# Patient Record
Sex: MALE | Race: BLACK OR AFRICAN AMERICAN | NOT HISPANIC OR LATINO | Employment: UNEMPLOYED | URBAN - METROPOLITAN AREA
[De-identification: names, ages, dates, MRNs, and addresses within clinical notes are randomized per-mention and may not be internally consistent; named-entity substitution may affect disease eponyms.]

---

## 2018-05-05 ENCOUNTER — HOSPITAL ENCOUNTER (INPATIENT)
Facility: HOSPITAL | Age: 38
LOS: 3 days | Discharge: HOME | DRG: 292 | End: 2018-05-08
Attending: EMERGENCY MEDICINE | Admitting: HOSPITALIST

## 2018-05-05 ENCOUNTER — APPOINTMENT (EMERGENCY)
Dept: RADIOLOGY | Facility: HOSPITAL | Age: 38
DRG: 292 | End: 2018-05-05

## 2018-05-05 DIAGNOSIS — R06.00 DYSPNEA: ICD-10-CM

## 2018-05-05 DIAGNOSIS — I50.9 CHF (CONGESTIVE HEART FAILURE) (HCC): Primary | ICD-10-CM

## 2018-05-05 DIAGNOSIS — I50.9 ACUTE CONGESTIVE HEART FAILURE, UNSPECIFIED HEART FAILURE TYPE (HCC): ICD-10-CM

## 2018-05-05 DIAGNOSIS — J90 PLEURAL EFFUSION: ICD-10-CM

## 2018-05-05 PROBLEM — N17.9 AKI (ACUTE KIDNEY INJURY) (HCC): Status: ACTIVE | Noted: 2018-05-05

## 2018-05-05 PROBLEM — Z89.512 HX OF BKA, LEFT (HCC): Status: ACTIVE | Noted: 2018-05-05

## 2018-05-05 PROBLEM — E87.6 HYPOKALEMIA: Status: ACTIVE | Noted: 2018-05-05

## 2018-05-05 PROBLEM — J45.909 ASTHMA: Status: ACTIVE | Noted: 2018-05-05

## 2018-05-05 LAB
ALBUMIN SERPL BCP-MCNC: 1.5 G/DL (ref 3.5–5)
ALP SERPL-CCNC: 304 U/L (ref 46–116)
ALT SERPL W P-5'-P-CCNC: 43 U/L (ref 12–78)
ANION GAP SERPL CALCULATED.3IONS-SCNC: 2 MMOL/L (ref 4–13)
ANION GAP SERPL CALCULATED.3IONS-SCNC: 8 MMOL/L (ref 4–13)
AST SERPL W P-5'-P-CCNC: 98 U/L (ref 5–45)
BASOPHILS # BLD AUTO: 0.05 THOUSANDS/ΜL (ref 0–0.1)
BASOPHILS NFR BLD AUTO: 0 % (ref 0–1)
BILIRUB SERPL-MCNC: 0.61 MG/DL (ref 0.2–1)
BUN SERPL-MCNC: 18 MG/DL (ref 5–25)
BUN SERPL-MCNC: 19 MG/DL (ref 5–25)
CALCIUM SERPL-MCNC: 8.4 MG/DL (ref 8.3–10.1)
CALCIUM SERPL-MCNC: 8.8 MG/DL (ref 8.3–10.1)
CHLORIDE SERPL-SCNC: 102 MMOL/L (ref 100–108)
CHLORIDE SERPL-SCNC: 102 MMOL/L (ref 100–108)
CO2 SERPL-SCNC: 28 MMOL/L (ref 21–32)
CO2 SERPL-SCNC: 31 MMOL/L (ref 21–32)
CREAT SERPL-MCNC: 1.18 MG/DL (ref 0.6–1.3)
CREAT SERPL-MCNC: 1.36 MG/DL (ref 0.6–1.3)
EOSINOPHIL # BLD AUTO: 0.27 THOUSAND/ΜL (ref 0–0.61)
EOSINOPHIL NFR BLD AUTO: 2 % (ref 0–6)
ERYTHROCYTE [DISTWIDTH] IN BLOOD BY AUTOMATED COUNT: 14.5 % (ref 11.6–15.1)
GFR SERPL CREATININE-BSD FRML MDRD: 75 ML/MIN/1.73SQ M
GFR SERPL CREATININE-BSD FRML MDRD: 89 ML/MIN/1.73SQ M
GLUCOSE SERPL-MCNC: 207 MG/DL (ref 65–140)
GLUCOSE SERPL-MCNC: 227 MG/DL (ref 65–140)
GLUCOSE SERPL-MCNC: 344 MG/DL (ref 65–140)
GLUCOSE SERPL-MCNC: 350 MG/DL (ref 65–140)
HCT VFR BLD AUTO: 29.8 % (ref 36.5–49.3)
HGB BLD-MCNC: 9.6 G/DL (ref 12–17)
LYMPHOCYTES # BLD AUTO: 1.84 THOUSANDS/ΜL (ref 0.6–4.47)
LYMPHOCYTES NFR BLD AUTO: 16 % (ref 14–44)
MCH RBC QN AUTO: 28.7 PG (ref 26.8–34.3)
MCHC RBC AUTO-ENTMCNC: 32.2 G/DL (ref 31.4–37.4)
MCV RBC AUTO: 89 FL (ref 82–98)
MONOCYTES # BLD AUTO: 0.7 THOUSAND/ΜL (ref 0.17–1.22)
MONOCYTES NFR BLD AUTO: 6 % (ref 4–12)
NEUTROPHILS # BLD AUTO: 8.59 THOUSANDS/ΜL (ref 1.85–7.62)
NEUTS SEG NFR BLD AUTO: 76 % (ref 43–75)
NRBC BLD AUTO-RTO: 0 /100 WBCS
NT-PROBNP SERPL-MCNC: 2340 PG/ML
PLATELET # BLD AUTO: 429 THOUSANDS/UL (ref 149–390)
PMV BLD AUTO: 10.1 FL (ref 8.9–12.7)
POTASSIUM SERPL-SCNC: 2.8 MMOL/L (ref 3.5–5.3)
POTASSIUM SERPL-SCNC: 6.1 MMOL/L (ref 3.5–5.3)
PROT SERPL-MCNC: 8 G/DL (ref 6.4–8.2)
RBC # BLD AUTO: 3.34 MILLION/UL (ref 3.88–5.62)
SODIUM SERPL-SCNC: 135 MMOL/L (ref 136–145)
SODIUM SERPL-SCNC: 138 MMOL/L (ref 136–145)
TROPONIN I SERPL-MCNC: <0.02 NG/ML
WBC # BLD AUTO: 11.45 THOUSAND/UL (ref 4.31–10.16)

## 2018-05-05 PROCEDURE — 99285 EMERGENCY DEPT VISIT HI MDM: CPT

## 2018-05-05 PROCEDURE — 83880 ASSAY OF NATRIURETIC PEPTIDE: CPT | Performed by: EMERGENCY MEDICINE

## 2018-05-05 PROCEDURE — 84484 ASSAY OF TROPONIN QUANT: CPT | Performed by: EMERGENCY MEDICINE

## 2018-05-05 PROCEDURE — 71046 X-RAY EXAM CHEST 2 VIEWS: CPT

## 2018-05-05 PROCEDURE — 80048 BASIC METABOLIC PNL TOTAL CA: CPT | Performed by: EMERGENCY MEDICINE

## 2018-05-05 PROCEDURE — 82948 REAGENT STRIP/BLOOD GLUCOSE: CPT

## 2018-05-05 PROCEDURE — 85025 COMPLETE CBC W/AUTO DIFF WBC: CPT | Performed by: EMERGENCY MEDICINE

## 2018-05-05 PROCEDURE — 80053 COMPREHEN METABOLIC PANEL: CPT | Performed by: EMERGENCY MEDICINE

## 2018-05-05 PROCEDURE — 36415 COLL VENOUS BLD VENIPUNCTURE: CPT

## 2018-05-05 PROCEDURE — 96374 THER/PROPH/DIAG INJ IV PUSH: CPT

## 2018-05-05 PROCEDURE — 93005 ELECTROCARDIOGRAM TRACING: CPT | Performed by: EMERGENCY MEDICINE

## 2018-05-05 PROCEDURE — 99223 1ST HOSP IP/OBS HIGH 75: CPT | Performed by: PHYSICIAN ASSISTANT

## 2018-05-05 PROCEDURE — 94640 AIRWAY INHALATION TREATMENT: CPT

## 2018-05-05 RX ORDER — ACETAMINOPHEN 325 MG/1
650 TABLET ORAL ONCE
Status: COMPLETED | OUTPATIENT
Start: 2018-05-05 | End: 2018-05-05

## 2018-05-05 RX ORDER — ONDANSETRON 2 MG/ML
4 INJECTION INTRAMUSCULAR; INTRAVENOUS EVERY 6 HOURS PRN
Status: DISCONTINUED | OUTPATIENT
Start: 2018-05-05 | End: 2018-05-08 | Stop reason: HOSPADM

## 2018-05-05 RX ORDER — HYDRALAZINE HYDROCHLORIDE 25 MG/1
25 TABLET, FILM COATED ORAL 2 TIMES DAILY
Status: DISCONTINUED | OUTPATIENT
Start: 2018-05-05 | End: 2018-05-08 | Stop reason: HOSPADM

## 2018-05-05 RX ORDER — INSULIN GLARGINE 100 [IU]/ML
75 INJECTION, SOLUTION SUBCUTANEOUS
Status: DISCONTINUED | OUTPATIENT
Start: 2018-05-05 | End: 2018-05-06

## 2018-05-05 RX ORDER — LEVOTHYROXINE SODIUM 0.03 MG/1
25 TABLET ORAL DAILY
COMMUNITY

## 2018-05-05 RX ORDER — LEVOTHYROXINE SODIUM 0.03 MG/1
25 TABLET ORAL
Status: DISCONTINUED | OUTPATIENT
Start: 2018-05-06 | End: 2018-05-08 | Stop reason: HOSPADM

## 2018-05-05 RX ORDER — LABETALOL HYDROCHLORIDE 5 MG/ML
10 INJECTION, SOLUTION INTRAVENOUS ONCE
Status: COMPLETED | OUTPATIENT
Start: 2018-05-05 | End: 2018-05-05

## 2018-05-05 RX ORDER — ACETAMINOPHEN 325 MG/1
650 TABLET ORAL EVERY 4 HOURS PRN
Status: DISCONTINUED | OUTPATIENT
Start: 2018-05-05 | End: 2018-05-08 | Stop reason: HOSPADM

## 2018-05-05 RX ORDER — HEPARIN SODIUM 5000 [USP'U]/ML
5000 INJECTION, SOLUTION INTRAVENOUS; SUBCUTANEOUS EVERY 8 HOURS SCHEDULED
Status: DISCONTINUED | OUTPATIENT
Start: 2018-05-05 | End: 2018-05-08 | Stop reason: HOSPADM

## 2018-05-05 RX ORDER — LEVALBUTEROL 1.25 MG/.5ML
1.25 SOLUTION, CONCENTRATE RESPIRATORY (INHALATION)
Status: DISCONTINUED | OUTPATIENT
Start: 2018-05-05 | End: 2018-05-05

## 2018-05-05 RX ORDER — ALBUTEROL SULFATE 90 UG/1
2 AEROSOL, METERED RESPIRATORY (INHALATION) EVERY 4 HOURS PRN
Status: DISCONTINUED | OUTPATIENT
Start: 2018-05-05 | End: 2018-05-08 | Stop reason: HOSPADM

## 2018-05-05 RX ORDER — FUROSEMIDE 10 MG/ML
20 INJECTION INTRAMUSCULAR; INTRAVENOUS ONCE
Status: COMPLETED | OUTPATIENT
Start: 2018-05-05 | End: 2018-05-05

## 2018-05-05 RX ORDER — LEVALBUTEROL 1.25 MG/.5ML
1.25 SOLUTION, CONCENTRATE RESPIRATORY (INHALATION) EVERY 6 HOURS PRN
Status: DISCONTINUED | OUTPATIENT
Start: 2018-05-05 | End: 2018-05-07

## 2018-05-05 RX ORDER — LISINOPRIL 20 MG/1
1 TABLET ORAL DAILY
Status: ON HOLD | COMMUNITY
Start: 2018-01-29 | End: 2018-05-08

## 2018-05-05 RX ORDER — ALBUTEROL SULFATE 2.5 MG/3ML
5 SOLUTION RESPIRATORY (INHALATION) ONCE
Status: COMPLETED | OUTPATIENT
Start: 2018-05-05 | End: 2018-05-05

## 2018-05-05 RX ORDER — ACETAMINOPHEN 325 MG/1
650 TABLET ORAL ONCE
Status: DISCONTINUED | OUTPATIENT
Start: 2018-05-05 | End: 2018-05-05

## 2018-05-05 RX ORDER — FUROSEMIDE 10 MG/ML
20 INJECTION INTRAMUSCULAR; INTRAVENOUS
Status: DISCONTINUED | OUTPATIENT
Start: 2018-05-05 | End: 2018-05-07

## 2018-05-05 RX ORDER — POTASSIUM CHLORIDE 20 MEQ/1
40 TABLET, EXTENDED RELEASE ORAL ONCE
Status: COMPLETED | OUTPATIENT
Start: 2018-05-05 | End: 2018-05-05

## 2018-05-05 RX ORDER — INSULIN GLARGINE 100 [IU]/ML
75 INJECTION, SOLUTION SUBCUTANEOUS
Status: ON HOLD | COMMUNITY
End: 2018-05-16

## 2018-05-05 RX ADMIN — LABETALOL 20 MG/4 ML (5 MG/ML) INTRAVENOUS SYRINGE 10 MG: at 13:25

## 2018-05-05 RX ADMIN — INSULIN LISPRO 5 UNITS: 100 INJECTION, SOLUTION INTRAVENOUS; SUBCUTANEOUS at 21:17

## 2018-05-05 RX ADMIN — FUROSEMIDE 20 MG: 10 INJECTION, SOLUTION INTRAMUSCULAR; INTRAVENOUS at 18:08

## 2018-05-05 RX ADMIN — HEPARIN SODIUM 5000 UNITS: 5000 INJECTION, SOLUTION INTRAVENOUS; SUBCUTANEOUS at 21:08

## 2018-05-05 RX ADMIN — POTASSIUM CHLORIDE 40 MEQ: 1500 TABLET, EXTENDED RELEASE ORAL at 18:08

## 2018-05-05 RX ADMIN — ALBUTEROL SULFATE 5 MG: 2.5 SOLUTION RESPIRATORY (INHALATION) at 12:08

## 2018-05-05 RX ADMIN — HYDRALAZINE HYDROCHLORIDE 25 MG: 25 TABLET, FILM COATED ORAL at 18:09

## 2018-05-05 RX ADMIN — INSULIN LISPRO 5 UNITS: 100 INJECTION, SOLUTION INTRAVENOUS; SUBCUTANEOUS at 18:59

## 2018-05-05 RX ADMIN — FUROSEMIDE 20 MG: 10 INJECTION, SOLUTION INTRAMUSCULAR; INTRAVENOUS at 12:08

## 2018-05-05 RX ADMIN — IPRATROPIUM BROMIDE 0.5 MG: 0.5 SOLUTION RESPIRATORY (INHALATION) at 12:08

## 2018-05-05 RX ADMIN — ACETAMINOPHEN 650 MG: 325 TABLET, FILM COATED ORAL at 13:25

## 2018-05-05 RX ADMIN — INSULIN GLARGINE 75 UNITS: 100 INJECTION, SOLUTION SUBCUTANEOUS at 21:09

## 2018-05-05 RX ADMIN — ACETAMINOPHEN 650 MG: 325 TABLET, FILM COATED ORAL at 21:13

## 2018-05-05 RX ADMIN — NITROGLYCERIN 1 INCH: 20 OINTMENT TOPICAL at 18:09

## 2018-05-05 NOTE — ED NOTES
Patient transported to [facility] [place], INGE  05/05/18 5622

## 2018-05-05 NOTE — ED PROVIDER NOTES
History  Chief Complaint   Patient presents with    Shortness of Breath     Patient reports SOB X2 days  Patient denies CP  History provided by:  Patient   used: No    Shortness of Breath   Severity:  Moderate  Onset quality:  Gradual  Duration:  2 days  Timing:  Intermittent  Progression:  Worsening  Chronicity:  New  Context: activity    Relieved by:  Nothing  Worsened by:  Nothing  Ineffective treatments:  None tried  Associated symptoms: chest pain    Associated symptoms: no abdominal pain, no cough, no fever, no headaches, no neck pain, no rash, no sore throat, no sputum production and no vomiting    Chest pain:     Quality: tightness      Severity:  Mild    Onset quality:  Gradual    Duration:  2 days    Timing:  Intermittent    Progression:  Waxing and waning    Chronicity:  New  Risk factors comment:  HTN, DM      Prior to Admission Medications   Prescriptions Last Dose Informant Patient Reported? Taking? Cholecalciferol (VITAMIN D PO)   Yes Yes   Sig: Take by mouth daily   Levothyroxine Sodium (SYNTHROID PO)   Yes Yes   Sig: Take 20 mcg by mouth daily   insulin glargine (LANTUS) 100 units/mL subcutaneous injection   Yes Yes   Sig: Inject 75 Units under the skin daily at bedtime   insulin lispro (HumaLOG) 100 units/mL injection   Yes Yes   Sig: Inject 45 Units under the skin daily   insulin lispro (HumaLOG) 100 units/mL injection   Yes Yes   Sig: Inject under the skin daily at bedtime as needed for high blood sugar   lisinopril (ZESTRIL) 20 mg tablet   Yes Yes   Sig: Take 1 tablet by mouth Daily      Facility-Administered Medications: None       Past Medical History:   Diagnosis Date    Diabetes mellitus ([facility] [address] )     Disease of thyroid gland     Hypertension     Neuropathy        Past Surgical History:   Procedure Laterality Date    LEG AMPUTATION      LEG SURGERY         History reviewed  No pertinent family history    I have reviewed and agree with the history as documented  Social History   Substance Use Topics    Smoking status: Never Smoker    Smokeless tobacco: Never Used    Alcohol use No        Review of Systems   Constitutional: Negative for activity change, chills and fever  HENT: Negative for facial swelling, sore throat and trouble swallowing  Eyes: Negative for pain and visual disturbance  Respiratory: Positive for shortness of breath  Negative for cough, sputum production and chest tightness  Cardiovascular: Positive for chest pain  Negative for leg swelling  Gastrointestinal: Negative for abdominal pain, blood in stool, diarrhea, nausea and vomiting  Genitourinary: Negative for dysuria and flank pain  Musculoskeletal: Negative for back pain, neck pain and neck stiffness  Skin: Negative for pallor and rash  Allergic/Immunologic: Negative for environmental allergies and immunocompromised state  Neurological: Negative for dizziness and headaches  Hematological: Negative for adenopathy  Does not bruise/bleed easily  Psychiatric/Behavioral: Negative for agitation and behavioral problems  All other systems reviewed and are negative  Physical Exam  ED Triage Vitals   Temperature Pulse Respirations Blood Pressure SpO2   05/05/18 1045 05/05/18 1044 05/05/18 1044 05/05/18 1044 05/05/18 1044   98 5 °F (36 9 °C) 93 22 (!) 184/99 99 %      Temp Source Heart Rate Source Patient Position - Orthostatic VS BP Location FiO2 (%)   05/05/18 1045 05/05/18 1044 05/05/18 1044 05/05/18 1212 --   Oral Monitor Lying Left arm       Pain Score       05/05/18 1212       8           Orthostatic Vital Signs  Vitals:    05/05/18 1044 05/05/18 1212 05/05/18 1336 05/05/18 1401   BP: (!) 184/99 (!) 184/103 (!) 176/80 (!) 186/100   Pulse: 93 92 95 93   Patient Position - Orthostatic VS: Lying Sitting Sitting Sitting       Physical Exam   Constitutional: He is oriented to person, place, and time  He appears well-developed and well-nourished  No distress  HENT:   Head: Normocephalic and atraumatic  Eyes: EOM are normal    Neck: Normal range of motion  Neck supple  Cardiovascular: Normal rate, regular rhythm, normal heart sounds and intact distal pulses  Pulmonary/Chest: Effort normal    Diminished bibasal   Abdominal: Soft  Bowel sounds are normal  There is no tenderness  There is no rebound and no guarding  Musculoskeletal: Normal range of motion  Neurological: He is alert and oriented to person, place, and time  Skin: Skin is warm and dry  Psychiatric: He has a normal mood and affect  Nursing note and vitals reviewed        ED Medications  Medications   furosemide (LASIX) injection 20 mg (20 mg Intravenous Given 5/5/18 1808)   insulin glargine (LANTUS) subcutaneous injection 75 Units 0 75 mL (75 Units Subcutaneous Given 5/5/18 2109)   insulin lispro (HumaLOG) 100 units/mL subcutaneous injection 45 Units (not administered)   levothyroxine tablet 25 mcg (not administered)   ondansetron (ZOFRAN) injection 4 mg (not administered)   heparin (porcine) subcutaneous injection 5,000 Units (5,000 Units Subcutaneous Given 5/5/18 2108)   acetaminophen (TYLENOL) tablet 650 mg (650 mg Oral Given 5/5/18 2113)   hydrALAZINE (APRESOLINE) tablet 25 mg (25 mg Oral Given 5/5/18 1809)   nitroglycerin (NITRO-BID) 2 % TD ointment 1 inch (1 inch Topical Given 5/5/18 1809)   insulin lispro (HumaLOG) 100 units/mL subcutaneous injection 1-6 Units (5 Units Subcutaneous Given 5/5/18 1859)   insulin lispro (HumaLOG) 100 units/mL subcutaneous injection 1-6 Units (5 Units Subcutaneous Given 5/5/18 2117)   albuterol (PROVENTIL HFA,VENTOLIN HFA) inhaler 2 puff (not administered)   levalbuterol (XOPENEX) inhalation solution 1 25 mg (not administered)   albuterol inhalation solution 5 mg (5 mg Nebulization Given 5/5/18 1208)   ipratropium (ATROVENT) 0 02 % inhalation solution 0 5 mg (0 5 mg Nebulization Given 5/5/18 1208)   furosemide (LASIX) injection 20 mg (20 mg Intravenous Given 5/5/18 1208)   labetalol (NORMODYNE) injection 10 mg (10 mg Intravenous Given 5/5/18 1325)   acetaminophen (TYLENOL) tablet 650 mg (650 mg Oral Given 5/5/18 1325)   potassium chloride (K-DUR,KLOR-CON) CR tablet 40 mEq (40 mEq Oral Given 5/5/18 1808)       Diagnostic Studies  Results Reviewed     Procedure Component Value Units Date/Time    Basic metabolic panel [[id number]]  (Abnormal) Collected:  05/05/18 1323    Lab Status:  Final result Specimen:  Blood from Arm, Left Updated:  05/05/18 1345     Sodium 138 mmol/L      Potassium 2 8 (L) mmol/L      Chloride 102 mmol/L      CO2 28 mmol/L      Anion Gap 8 mmol/L      BUN 18 mg/dL      Creatinine 1 36 (H) mg/dL      Glucose 227 (H) mg/dL      Calcium 8 8 mg/dL      eGFR 75 ml/min/1 73sq m     Narrative:         National Kidney Disease Education Program recommendations are as follows:  GFR calculation is accurate only with a steady state creatinine  Chronic Kidney disease less than 60 ml/min/1 73 sq  meters  Kidney failure less than 15 ml/min/1 73 sq  meters  Comprehensive metabolic panel [[id number]]  (Abnormal) Collected:  05/05/18 1054    Lab Status:  Final result Specimen:  Blood from Arm, Right Updated:  05/05/18 1138     Sodium 135 (L) mmol/L      Potassium 6 1 (H) mmol/L      Chloride 102 mmol/L      CO2 31 mmol/L      Anion Gap 2 (L) mmol/L      BUN 19 mg/dL      Creatinine 1 18 mg/dL      Glucose 207 (H) mg/dL      Calcium 8 4 mg/dL      AST 98 (H) U/L      ALT 43 U/L      Alkaline Phosphatase 304 (H) U/L      Total Protein 8 0 g/dL      Albumin 1 5 (L) g/dL      Total Bilirubin 0 61 mg/dL      eGFR 89 ml/min/1 73sq m     Narrative:         National Kidney Disease Education Program recommendations are as follows:  GFR calculation is accurate only with a steady state creatinine  Chronic Kidney disease less than 60 ml/min/1 73 sq  meters  Kidney failure less than 15 ml/min/1 73 sq  meters      BNP [[id number]]  (Abnormal) Collected:  05/05/18 1054    Lab Status: Final result Specimen:  Blood from Arm, Right Updated:  05/05/18 1138     NT-proBNP 2,340 (H) pg/mL     Troponin I [[id number]]  (Normal) Collected:  05/05/18 1054    Lab Status:  Final result Specimen:  Blood from Arm, Right Updated:  05/05/18 1120     Troponin I <0 02 ng/mL     Narrative:         [employer] Chemistry analyzer 99% cutoff is > 0 04 ng/mL in network labs    o cTnI 99% cutoff is useful only when applied to patients in the clinical setting of myocardial ischemia  o cTnI 99% cutoff should be interpreted in the context of clinical history, ECG findings and possibly cardiac imaging to establish correct diagnosis  o cTnI 99% cutoff may be suggestive but clearly not indicative of a coronary event without the clinical setting of myocardial ischemia      CBC and differential [[id number]]  (Abnormal) Collected:  05/05/18 1054    Lab Status:  Final result Specimen:  Blood from Arm, Right Updated:  05/05/18 1103     WBC 11 45 (H) Thousand/uL      RBC 3 34 (L) Million/uL      Hemoglobin 9 6 (L) g/dL      Hematocrit 29 8 (L) %      MCV 89 fL      MCH 28 7 pg      MCHC 32 2 g/dL      RDW 14 5 %      MPV 10 1 fL      Platelets 880 (H) Thousands/uL      nRBC 0 /100 WBCs      Neutrophils Relative 76 (H) %      Lymphocytes Relative 16 %      Monocytes Relative 6 %      Eosinophils Relative 2 %      Basophils Relative 0 %      Neutrophils Absolute 8 59 (H) Thousands/µL      Lymphocytes Absolute 1 84 Thousands/µL      Monocytes Absolute 0 70 Thousand/µL      Eosinophils Absolute 0 27 Thousand/µL      Basophils Absolute 0 05 Thousands/µL                  X-ray chest 2 views   Final Result by Vicente Hernandez MD (05/05 1116)   Suspected mild vascular congestion and trace pleural effusions         Workstation performed: [id number]                    Procedures  ECG 12 Lead Documentation  Date/Time: 5/5/2018 12:19 PM  Performed by: Cirilo Leos  Authorized by: Cirilo Leos     Indications / Diagnosis:  Dyspnea  ECG reviewed by me, the ED Provider: yes    Patient location:  ED  Interpretation:     Interpretation: normal    Rate:     ECG rate assessment: normal    Rhythm:     Rhythm: sinus rhythm    Ectopy:     Ectopy: none    QRS:     QRS axis:  Normal  Conduction:     Conduction: normal    ST segments:     ST segments:  Non-specific    Depression:  I, V5 and V6  T waves:     T waves: normal             Phone Contacts  ED Phone Contact    ED Course  ED Course as of May 05 2213   Sat May 05, 2018   [facility] [place], CXR reviewed, pleural effusions, elevated BNP, we will give Lasix  Elevated potassium noted, we will repeat BMP after albuterol/Atrovent neb  1216 Lasix ordered  NT-proBNP: (!) 2,340                               MDM  Number of Diagnoses or Management Options  CHF (congestive heart failure) ([facility] [address] ): new and requires workup  Dyspnea: new and requires workup  Pleural effusion: new and requires workup  Diagnosis management comments: Patient is a 40-year-old male, history of hypertension, diabetes, asthma; comes in with complaints of dyspnea the past 2 days, patient is the [place]; also associated with chest tightness, intermittent, denies diaphoresis, fever, cough or phlegm  On exam hypertension noted, lung exam shows diminished air entry at bilateral bases, cardiovascular heart sounds normal, mild peripheral edema noted  Impression:  Congestive heart failure, asthma, acute coronary syndrome  Will check labs including CBC, CMP, BNP, EKG, chest x-ray, troponin         Amount and/or Complexity of Data Reviewed  Clinical lab tests: ordered and reviewed  Tests in the radiology section of CPT®: ordered and reviewed  Tests in the medicine section of CPT®: ordered and reviewed  Discuss the patient with other providers: yes  Independent visualization of images, tracings, or specimens: yes      CritCare Time    Disposition  Final diagnoses:   CHF (congestive heart failure) (HCC)   Dyspnea   Pleural effusion     Time reflects when diagnosis was documented in both MDM as applicable and the Disposition within this note     Time User Action Codes Description Comment    5/5/2018 12:21 PM Ever Diaz Add [I50 9] CHF (congestive heart failure) ([address] )     5/5/2018 12:21 PM Ever Diaz Add [R06 00] Dyspnea     5/5/2018 12:22 PM Ever Diaz Add [J90] Pleural effusion     5/5/2018  5:33 PM CODY Dai [facility] Add [I50 9] Acute congestive heart failure, unspecified heart failure type [facility])       ED Disposition     ED Disposition Condition Comment    Admit  Case was discussed with SHELLY (Dr Carmelo Shea) and the patient's admission status was agreed to be Admission Status: inpatient status to the service of Dr Carmelo Shea  Follow-up Information    None       Current Discharge Medication List      CONTINUE these medications which have NOT CHANGED    Details   Cholecalciferol (VITAMIN D PO) Take by mouth daily      insulin glargine (LANTUS) 100 units/mL subcutaneous injection Inject 75 Units under the skin daily at bedtime      !! insulin lispro (HumaLOG) 100 units/mL injection Inject 45 Units under the skin daily      !! insulin lispro (HumaLOG) 100 units/mL injection Inject under the skin daily at bedtime as needed for high blood sugar      Levothyroxine Sodium (SYNTHROID PO) Take 20 mcg by mouth daily      lisinopril (ZESTRIL) 20 mg tablet Take 1 tablet by mouth Daily       ! ! - Potential duplicate medications found  Please discuss with provider  No discharge procedures on file      ED Provider  Electronically Signed by           Mariana Escalante MD  05/05/18 9917

## 2018-05-05 NOTE — H&P
History and Physical - [facility] Internal Medicine    Patient Information: Chrissie Hunt 34 y o  male MRN: [id number]  Unit/Bed#: E4 -01 Encounter: [id number]  Admitting Physician: Lizzie Lloyd PA-C  PCP: No primary care provider on file  Date of Admission:  05/05/18    Assessment/Plan:    Hospital Problem List:     Principal Problem:    Acute congestive heart failure (HCC)  Active Problems:    Hypertension    Disease of thyroid gland    Diabetes mellitus (HCC)    Hypokalemia    GARO (acute kidney injury) ([facility] [address] )    Asthma    Hx of BKA, left ([facility] [address] )      Primary Problem(s):  · Acute CHF- new diagnosis  · With mild overload- mild crackles in bases and mild THAI   · Chest x-ray with suspected mild vascular congestion and small pleural effusions  · Elevated BNP of 2340 with no prior  · Obtain echocardiogram   · Started on Lasix IV 20 mg in ED  · Will continue 20 mg IV Lasix twice daily given patient is Lasix naive  · Monitor I&Os, daily weights, place on telemetry  · Admission weight 238 lbs  · Consult Cardiology  Additional Problems:   · Suspected GARO:  Creatinine 1 36  Unable to baseline previous creatinine  Continue monitor in the setting of IV diuretics  · Type 2 diabetes with hyperglycemia:  Presenting with glucose of 227  Home regimen consists of Lantus 75 units before bed and Humalog 45 units  Placed on insulin sliding scale    · Essential hypertension:  Presenting with elevated blood pressure is 176/80, 184/103  Home regimen of lisinopril 20 mg daily  Will hold here in the setting of GARO  Received one dose of IV labetalol 10 mg in ED  Will provide hydralazine 25 mg twice daily and nitropaste for better blood pressure control  Appreciate cardiology recommendations  · Hypokalemia:  Potassium initially noted to be 6 1 however after neb treatment and repeat BMP K was 2 8  Will replete and continue to monitor      · Hypothyroidism:  Continue Synthroid    · Status post left BKA    · Asthma: Patient usually uses albuterol inhaler for his acute symptoms  He left his inhaler in [place] and has not been able to use it for the past 4 to 5 months  He is not on any maintenance inhalers  Provide albuterol nebs here and inhaler  Will need one upon dc  No active wheezing or obvious exacerbation  VTE Prophylaxis: Heparin  / sequential compression device   Code Status: full code  Anticipated Length of Stay:  Patient will be admitted on an Inpatient basis with an anticipated length of stay of  Greater than 2 midnights  Justification for Hospital Stay: acute chf with need for IV diuresis    Chief Complaint:   Shortness of breath    History of Present Illness:    Claudell Crape is a 34 y o  male with past medical history of type 2 diabetes, essential hypertension, hypothyroidism, asthma, status post left BKA  Patient normally resides in [facility] however has been here visiting his daughter for the past 4-5 months  Patient leaves for [place] again on May 21st     He presents with shortness of breath ongoing for the past 2 days  Patient notes he initially began with a tightness in his chest and felt it was difficult to catch his breath  Admits to gasping for air and being unable to lie flat in bed  Patient describes the experience as if he was drowning  He noticed his symptoms slightly improved and then worsened again today prompting him to seek further evaluation  Associated dry cough, increased swelling in his lower legs  He also notes approximately 20 lbs weight gain over the past 4-5 months  Pt uses the salt shaker daily- mostly for his dinner  Admits to eating canned foods at times  Denies history of CHF  Has never been on diuretics  Does not smoke, drink alcohol, or use drugs  Denies any palpitations, pain with inspiration, chest pain  No nausea vomiting, diarrhea or dysuria  Review of Systems:    General:   No Fever or chills; + weight gain   EENT:   No ear pain, facial swelling;  No sneezing, sore throat  Skin:   No rashes, color changes  Respiratory:     + shortness of breath, cough, wheezing,    Cardiovascular:     No chest pain, palpitations  Gastrointestinal:    No nausea, vomiting, diarrhea; No abdominal pain  Musculoskeletal:     No myalgias, swelling  Neurologic:   No dizziness, numbness, weakness  No speech difficulties  Psych:   No agitation,     Otherwise, All other twelve-point review of systems normal      Past Medical and Surgical History:     Past Medical History:   Diagnosis Date    Diabetes mellitus ([address] )     Disease of thyroid gland     Hypertension     Neuropathy        Past Surgical History:   Procedure Laterality Date    LEG AMPUTATION      LEG SURGERY         Meds/Allergies:    No current facility-administered medications for this encounter  Allergies   Allergen Reactions    Shellfish-Derived Products     Tramadol        Allergies: Allergies   Allergen Reactions    Shellfish-Derived Products     Tramadol        Social History:     Marital Status: Single     Substance Use History:   History   Alcohol Use No     History   Smoking Status    Never Smoker   Smokeless Tobacco    Never Used     History   Drug Use No       Family History:    non-contributory    Physical Exam:     Vitals:   Blood Pressure: (!) 186/100 (05/05/18 1401)  Pulse: 93 (05/05/18 1401)  Temperature: 98 °F (36 7 °C) (05/05/18 1401)  Temp Source: Tympanic (05/05/18 1401)  Respirations: 19 (05/05/18 1401)  Height: 5' 2" (157 5 cm) (05/05/18 1401)  Weight - Scale: 108 kg (238 lb 14 4 oz) (05/05/18 1045)  SpO2: 92 % (05/05/18 1401)    Physical Exam   Constitutional: He appears well-developed and well-nourished  No distress  HENT:   Head: Normocephalic and atraumatic  Eyes: Conjunctivae are normal    Cardiovascular: Normal rate, regular rhythm and normal heart sounds  Pulmonary/Chest: Effort normal  No respiratory distress  He has no wheezes  He has rales   He exhibits no tenderness  Mild crackles at bases of lungs bilaterally   Abdominal: Soft  Bowel sounds are normal  He exhibits no distension and no mass  There is no tenderness  There is no rebound and no guarding  Musculoskeletal:   Mild lower extremity edema  Skin: He is not diaphoretic  S/p left BKA  With wounds to both knees and right elbow  Nursing note and vitals reviewed  Additional Data:     Lab Results: I have personally reviewed pertinent reports  Results from last 7 days  Lab Units 05/05/18  1054   WBC Thousand/uL 11 45*   HEMOGLOBIN g/dL 9 6*   HEMATOCRIT % 29 8*   PLATELETS Thousands/uL 429*   NEUTROS PCT % 76*   LYMPHS PCT % 16   MONOS PCT % 6   EOS PCT % 2       Results from last 7 days  Lab Units 05/05/18  1323 05/05/18  1054   SODIUM mmol/L 138 135*   POTASSIUM mmol/L 2 8* 6 1*   CHLORIDE mmol/L 102 102   CO2 mmol/L 28 31   BUN mg/dL 18 19   CREATININE mg/dL 1 36* 1 18   CALCIUM mg/dL 8 8 8 4   TOTAL PROTEIN g/dL  --  8 0   BILIRUBIN TOTAL mg/dL  --  0 61   ALK PHOS U/L  --  304*   ALT U/L  --  43   AST U/L  --  98*   GLUCOSE RANDOM mg/dL 227* 207*           Imaging: I have personally reviewed pertinent reports  X-ray Chest 2 Views    Result Date: 5/5/2018  Narrative: CHEST INDICATION: 42-year-old male, chest pain, shortness of breath COMPARISON:  None EXAM PERFORMED/VIEWS:  XR CHEST PA & LATERAL FINDINGS: Mild bilateral pulmonary vascular congestion Cardiomediastinal silhouette appears unremarkable  Probable trace pleural effusions  No pneumothorax Osseous structures appear within normal limits for patient age  Impression: Suspected mild vascular congestion and trace pleural effusions Workstation performed: [id number]       EKG, Pathology, and Other Studies Reviewed on Admission:   · EKG: please refer to records     Allscripts Records Reviewed: Yes     Total Time for Visit, including Counseling / Coordination of Care: 45 minutes    Greater than 50% of this total time spent on direct patient counseling and coordination of care  ** Please Note: This note has been constructed using a voice recognition system   **

## 2018-05-06 LAB
ALBUMIN SERPL BCP-MCNC: 1.6 G/DL (ref 3.5–5)
ALP SERPL-CCNC: 275 U/L (ref 46–116)
ALT SERPL W P-5'-P-CCNC: 33 U/L (ref 12–78)
ANION GAP SERPL CALCULATED.3IONS-SCNC: 4 MMOL/L (ref 4–13)
AST SERPL W P-5'-P-CCNC: 34 U/L (ref 5–45)
ATRIAL RATE: 91 BPM
BILIRUB SERPL-MCNC: 0.3 MG/DL (ref 0.2–1)
BUN SERPL-MCNC: 26 MG/DL (ref 5–25)
CALCIUM SERPL-MCNC: 8.6 MG/DL (ref 8.3–10.1)
CHLORIDE SERPL-SCNC: 100 MMOL/L (ref 100–108)
CO2 SERPL-SCNC: 31 MMOL/L (ref 21–32)
CREAT SERPL-MCNC: 1.29 MG/DL (ref 0.6–1.3)
ERYTHROCYTE [DISTWIDTH] IN BLOOD BY AUTOMATED COUNT: 14.6 % (ref 11.6–15.1)
GFR SERPL CREATININE-BSD FRML MDRD: 80 ML/MIN/1.73SQ M
GLUCOSE SERPL-MCNC: 123 MG/DL (ref 65–140)
GLUCOSE SERPL-MCNC: 131 MG/DL (ref 65–140)
GLUCOSE SERPL-MCNC: 209 MG/DL (ref 65–140)
GLUCOSE SERPL-MCNC: 222 MG/DL (ref 65–140)
GLUCOSE SERPL-MCNC: 226 MG/DL (ref 65–140)
HCT VFR BLD AUTO: 30.3 % (ref 36.5–49.3)
HGB BLD-MCNC: 9.6 G/DL (ref 12–17)
MAGNESIUM SERPL-MCNC: 2 MG/DL (ref 1.6–2.6)
MCH RBC QN AUTO: 28.6 PG (ref 26.8–34.3)
MCHC RBC AUTO-ENTMCNC: 31.7 G/DL (ref 31.4–37.4)
MCV RBC AUTO: 90 FL (ref 82–98)
P AXIS: 70 DEGREES
PLATELET # BLD AUTO: 403 THOUSANDS/UL (ref 149–390)
PMV BLD AUTO: 10.4 FL (ref 8.9–12.7)
POTASSIUM SERPL-SCNC: 3.5 MMOL/L (ref 3.5–5.3)
PR INTERVAL: 158 MS
PROT SERPL-MCNC: 7.5 G/DL (ref 6.4–8.2)
QRS AXIS: -55 DEGREES
QRSD INTERVAL: 86 MS
QT INTERVAL: 326 MS
QTC INTERVAL: 400 MS
RBC # BLD AUTO: 3.36 MILLION/UL (ref 3.88–5.62)
SODIUM SERPL-SCNC: 135 MMOL/L (ref 136–145)
T WAVE AXIS: 130 DEGREES
VENTRICULAR RATE: 91 BPM
WBC # BLD AUTO: 10.68 THOUSAND/UL (ref 4.31–10.16)

## 2018-05-06 PROCEDURE — 99255 IP/OBS CONSLTJ NEW/EST HI 80: CPT | Performed by: INTERNAL MEDICINE

## 2018-05-06 PROCEDURE — 80053 COMPREHEN METABOLIC PANEL: CPT | Performed by: PHYSICIAN ASSISTANT

## 2018-05-06 PROCEDURE — 93010 ELECTROCARDIOGRAM REPORT: CPT | Performed by: INTERNAL MEDICINE

## 2018-05-06 PROCEDURE — 99232 SBSQ HOSP IP/OBS MODERATE 35: CPT | Performed by: HOSPITALIST

## 2018-05-06 PROCEDURE — 82948 REAGENT STRIP/BLOOD GLUCOSE: CPT

## 2018-05-06 PROCEDURE — 85027 COMPLETE CBC AUTOMATED: CPT | Performed by: PHYSICIAN ASSISTANT

## 2018-05-06 PROCEDURE — 83735 ASSAY OF MAGNESIUM: CPT | Performed by: PHYSICIAN ASSISTANT

## 2018-05-06 RX ORDER — OXYCODONE HYDROCHLORIDE 5 MG/1
5 TABLET ORAL EVERY 6 HOURS PRN
Status: DISCONTINUED | OUTPATIENT
Start: 2018-05-06 | End: 2018-05-08 | Stop reason: HOSPADM

## 2018-05-06 RX ORDER — MELOXICAM 7.5 MG/1
7.5 TABLET ORAL DAILY
Status: DISCONTINUED | OUTPATIENT
Start: 2018-05-06 | End: 2018-05-08 | Stop reason: HOSPADM

## 2018-05-06 RX ORDER — CARVEDILOL 6.25 MG/1
6.25 TABLET ORAL 2 TIMES DAILY WITH MEALS
Status: DISCONTINUED | OUTPATIENT
Start: 2018-05-06 | End: 2018-05-08

## 2018-05-06 RX ORDER — INSULIN GLARGINE 100 [IU]/ML
80 INJECTION, SOLUTION SUBCUTANEOUS
Status: DISCONTINUED | OUTPATIENT
Start: 2018-05-06 | End: 2018-05-08 | Stop reason: HOSPADM

## 2018-05-06 RX ORDER — ASPIRIN 81 MG/1
81 TABLET, CHEWABLE ORAL DAILY
Status: DISCONTINUED | OUTPATIENT
Start: 2018-05-06 | End: 2018-05-08 | Stop reason: HOSPADM

## 2018-05-06 RX ORDER — LISINOPRIL 5 MG/1
5 TABLET ORAL DAILY
Status: DISCONTINUED | OUTPATIENT
Start: 2018-05-06 | End: 2018-05-08 | Stop reason: HOSPADM

## 2018-05-06 RX ADMIN — HYDRALAZINE HYDROCHLORIDE 25 MG: 25 TABLET, FILM COATED ORAL at 18:20

## 2018-05-06 RX ADMIN — INSULIN LISPRO 45 UNITS: 100 INJECTION, SOLUTION INTRAVENOUS; SUBCUTANEOUS at 08:28

## 2018-05-06 RX ADMIN — FUROSEMIDE 20 MG: 10 INJECTION, SOLUTION INTRAMUSCULAR; INTRAVENOUS at 08:28

## 2018-05-06 RX ADMIN — INSULIN LISPRO 2 UNITS: 100 INJECTION, SOLUTION INTRAVENOUS; SUBCUTANEOUS at 21:44

## 2018-05-06 RX ADMIN — FUROSEMIDE 20 MG: 10 INJECTION, SOLUTION INTRAMUSCULAR; INTRAVENOUS at 18:20

## 2018-05-06 RX ADMIN — CARVEDILOL 6.25 MG: 6.25 TABLET, FILM COATED ORAL at 18:20

## 2018-05-06 RX ADMIN — HEPARIN SODIUM 5000 UNITS: 5000 INJECTION, SOLUTION INTRAVENOUS; SUBCUTANEOUS at 21:42

## 2018-05-06 RX ADMIN — LISINOPRIL 5 MG: 5 TABLET ORAL at 10:54

## 2018-05-06 RX ADMIN — HYDRALAZINE HYDROCHLORIDE 25 MG: 25 TABLET, FILM COATED ORAL at 08:28

## 2018-05-06 RX ADMIN — NITROGLYCERIN 1 INCH: 20 OINTMENT TOPICAL at 18:20

## 2018-05-06 RX ADMIN — ACETAMINOPHEN 650 MG: 325 TABLET, FILM COATED ORAL at 07:35

## 2018-05-06 RX ADMIN — MELOXICAM 7.5 MG: 7.5 TABLET ORAL at 11:30

## 2018-05-06 RX ADMIN — LEVOTHYROXINE SODIUM 25 MCG: 25 TABLET ORAL at 06:00

## 2018-05-06 RX ADMIN — INSULIN GLARGINE 80 UNITS: 100 INJECTION, SOLUTION SUBCUTANEOUS at 21:44

## 2018-05-06 RX ADMIN — OXYCODONE HYDROCHLORIDE 5 MG: 5 TABLET ORAL at 23:05

## 2018-05-06 RX ADMIN — INSULIN LISPRO 2 UNITS: 100 INJECTION, SOLUTION INTRAVENOUS; SUBCUTANEOUS at 08:28

## 2018-05-06 RX ADMIN — ASPIRIN 81 MG 81 MG: 81 TABLET ORAL at 10:54

## 2018-05-06 RX ADMIN — NITROGLYCERIN 1 INCH: 20 OINTMENT TOPICAL at 08:28

## 2018-05-06 RX ADMIN — HEPARIN SODIUM 5000 UNITS: 5000 INJECTION, SOLUTION INTRAVENOUS; SUBCUTANEOUS at 06:00

## 2018-05-06 RX ADMIN — HEPARIN SODIUM 5000 UNITS: 5000 INJECTION, SOLUTION INTRAVENOUS; SUBCUTANEOUS at 14:28

## 2018-05-06 NOTE — CONSULTS
Electrophysiology-Cardiology (EP)   Madelaine Anthony 34 y o  male MRN: [id number]  Unit/Bed#: E4 -01 Encounter: [id number]        IMPRESSION:  1  Acute CHF, new dx, unknown EF, elevated BNP and edema, pulm edema  Only signs of possible CAD is poor R wave progression on EKG, and he is diabetic w h/o Left BKA, so certainly possible  2  Poorly controlled hypertension  3  Lives in [facility], here on vacation, limited care available when he lives  4  GARO CR 1 36 to 1 28  5  Insulin dependent DMT2  6  Morbid obesity    PLAN:  1  Agree w lasix 20mg IV BID  2  STart carvedilol 6 25mg bid  3  Restart lisnopril 5mg (he was on this at home at 10mg he said)  4  F/u TTE tomorrow  5  IF EF down on TTE would do ischemic eval   , check lipids for now  6  Albumin is 1 6- Total protein 7 5---not sure if has Nephrotic syndrome or liver disease as cause of protein loss leading to edema? Will defer to primary team                                                      Referring Physian: Alejandra Lewis MD    Chief Complain/Reason for Referal: CHF, SOB  HPI:Damian Sifuentes is a 34 y o  Patient Active Problem List    Diagnosis Date Noted    Acute congestive heart failure ([facility] ) 05/05/2018     Priority: Low    Hypokalemia 05/05/2018     Priority: Low    [facility]) [facility] ) 05/05/2018     Priority: Low    Asthma 05/05/2018     Priority: Low    Hx of BKA, left ([facility] ) 05/05/2018     Priority: Low    Hypertension      Priority: Low    Disease of thyroid gland      Priority: Low    Diabetes mellitus ([facility] )      Priority: Low     1  Acute CHF, new dx, unknown EF, elevated BNP and edema, pulm edema  Only signs of possible CAD is poor R wave progression on EKG, and he is diabetic w h/o Left BKA, so certainly possible  2  Poorly controlled hypertension, perhaps has LVH/hypertensive cardiomoypathy as cause of CHF  3  Lives in [place], here on vacation, limited care available when he lives  4  GARO CR 1 36 to 1 28  5  Insulin dependent DMT2  6  Morbid obesity  He is on vacation from [facility], no prior cardiac history other than hypertension  He takes BP meds and says usually SBP 140s  He has asthma and thought he was SOB from asthma over past few days  Also has edema             Past Medical History:   Diagnosis Date    Diabetes mellitus ([facility] )     Disease of thyroid gland     Hypertension     Neuropathy        Prescriptions Prior to Admission   Medication    Cholecalciferol (VITAMIN D PO)    insulin glargine (LANTUS) 100 units/mL subcutaneous injection    insulin lispro (HumaLOG) 100 units/mL injection    insulin lispro (HumaLOG) 100 units/mL injection    Levothyroxine Sodium (SYNTHROID PO)    lisinopril (ZESTRIL) 20 mg tablet       Scheduled Meds:  Current Facility-Administered Medications:  acetaminophen 650 mg Oral Q4H PRN Keshia Dai PA-C   albuterol 2 puff Inhalation Q4H PRN Stephon Mack PA-C   aspirin 81 mg Oral Daily Stefan Kim MD   carvedilol 6 25 mg Oral BID With Meals Stefan Kim MD   furosemide 20 mg Intravenous BID (diuretic) Stephon Mack PA-C   heparin (porcine) 5,000 Units Subcutaneous Q8H [facility] Keshia Dai PA-C   hydrALAZINE 25 mg Oral BID Keshia Dai PA-C   insulin glargine 80 Units Subcutaneous HS Angel Rodriguez MD   insulin lispro 1-6 Units Subcutaneous TID AC Keshia Dai PA-C   insulin lispro 1-6 Units Subcutaneous HS Keshia Dai PA-C   insulin lispro 45 Units Subcutaneous Daily Keshia Dai PA-C   levalbuterol 1 25 mg Nebulization Q6H PRN Stephon Mack PA-C   levothyroxine 25 mcg Oral Early Morning Keshia Dai PA-C   lisinopril 5 mg Oral Daily Stefan Kim MD   meloxicam 7 5 mg Oral Daily Alexus Chatterjee MD   nitroglycerin 1 inch Topical BID Keshia Dai PA-C   ondansetron 4 mg Intravenous Q6H PRN Keshia Dai PA-C     Continuous Infusions:   PRN Meds:   acetaminophen    albuterol    levalbuterol    ondansetron  Allergies   Allergen Reactions    Shellfish-Derived Products     Tramadol      I reviewed the Home Medication list in the chart  History reviewed  No pertinent family history  Social History     Social History    Marital status: Single     Spouse name: N/A    Number of children: N/A    Years of education: N/A     Occupational History    Not on file  Social History Main Topics    Smoking status: Never Smoker    Smokeless tobacco: Never Used    Alcohol use No    Drug use: No    Sexual activity: Not on file     Other Topics Concern    Not on file     Social History Narrative    No narrative on file       Review of Systems -12 Point ROS reviewed and are negative or noted in chart except for Pertinent Positives Pertaining to Cardiovascular and Respiratory in HPI above  Vitals:    05/06/18 0756   BP: (!) 175/105   Pulse: 95   Resp: 17   Temp: 98 1 °F (36 7 °C)   SpO2: 96%     Vitals:    05/05/18 1743 05/06/18 0600   Weight: (!) 228 kg (501 lb 15 8 oz) 103 kg (226 lb 3 1 oz)     Intake/Output Summary (Last 24 hours) at 05/06/18 1038  Last data filed at 05/06/18 0599   Gross per 24 hour   Intake              420 ml   Output             2675 ml   Net            -2255 ml         GEN: No acute distress, Alert and oriented, obese  HEENT:Head, neck, ears, oral pharynx: Mucus membranes moist, oral pharynx clear, nares clear  External ears normal  EYES: Pupils equal, sclera anicteric, midline, normal conjuctiva  NECK: + JVD, supple, no obvious masses or thryomegaly or goiter  CARDIOVASCULAR: RRR, No murmur, rub, gallops S1,S2  LUNGS: Clear To auscultation bilaterally, normal effort, no rales, rhonchi, crackles  ABDOMEN: obese Soft, nondistended, nontender, without obvious organomegaly or ascites  EXTREMITIES/VASCULAR: 1+  Edema right leg, left he has BKA  Radial pulses intact, pedal pulses difficult to palpate, warm an well perfused  PSYCH: Normal Affect, no overt suicidal ideation, linear speech pattern without evidence of psychosis  NEURO: Grossly intact, moving all extremiteis equal, face symmetric, alert and responsive, no obvious focal defecits  HEME: No bleeding, bruising, petechia, purpura  SKIN: No significant rashes, warm, no diaphoresis or pallor       Lab Results:     CBC with diff:   Results from last 7 days  Lab Units 05/06/18  0700 05/05/18  1054   WBC Thousand/uL 10 68* 11 45*   HEMOGLOBIN g/dL 9 6* 9 6*   HEMATOCRIT % 30 3* 29 8*   MCV fL 90 89   PLATELETS Thousands/uL 403* 429*   MCH pg 28 6 28 7   MCHC g/dL 31 7 32 2   RDW % 14 6 14 5   MPV fL 10 4 10 1   NRBC AUTO /100 WBCs  --  0         CMP:  Results from last 7 days  Lab Units 05/06/18  0700 05/05/18  1323 05/05/18  1054   SODIUM mmol/L 135* 138 135*   POTASSIUM mmol/L 3 5 2 8* 6 1*   CHLORIDE mmol/L 100 102 102   CO2 mmol/L 31 28 31   ANION GAP mmol/L 4 8 2*   BUN mg/dL 26* 18 19   CREATININE mg/dL 1 29 1 36* 1 18   GLUCOSE RANDOM mg/dL 209* 227* 207*   CALCIUM mg/dL 8 6 8 8 8 4   AST U/L 34  --  98*   ALT U/L 33  --  43   ALK PHOS U/L 275*  --  304*   TOTAL PROTEIN g/dL 7 5  --  8 0   BILIRUBIN TOTAL mg/dL 0 30  --  0 61   EGFR ml/min/1 73sq m 80 75 89         BMP:  Results from last 7 days  Lab Units 05/06/18  0700 05/05/18  1323 05/05/18  1054   SODIUM mmol/L 135* 138 135*   POTASSIUM mmol/L 3 5 2 8* 6 1*   CHLORIDE mmol/L 100 102 102   CO2 mmol/L 31 28 31   BUN mg/dL 26* 18 19   CREATININE mg/dL 1 29 1 36* 1 18   GLUCOSE RANDOM mg/dL 209* 227* 207*   CALCIUM mg/dL 8 6 8 8 8 4       BNP:   Results Reviewed     Procedure Component Value Units Date/Time    Basic metabolic panel [[id number]]  (Abnormal) Collected:  05/05/18 1323    Lab Status:  Final result Specimen:  Blood from Arm, Left Updated:  05/05/18 1345     Sodium 138 mmol/L      Potassium 2 8 (L) mmol/L      Chloride 102 mmol/L      CO2 28 mmol/L      Anion Gap 8 mmol/L      BUN 18 mg/dL      Creatinine 1 36 (H) mg/dL      Glucose 227 (H) mg/dL      Calcium 8 8 mg/dL      eGFR 75 ml/min/1 73sq m     Narrative: National Kidney Disease Education Program recommendations are as follows:  GFR calculation is accurate only with a steady state creatinine  Chronic Kidney disease less than 60 ml/min/1 73 sq  meters  Kidney failure less than 15 ml/min/1 73 sq  meters  Comprehensive metabolic panel [[id number]]  (Abnormal) Collected:  05/05/18 1054    Lab Status:  Final result Specimen:  Blood from Arm, Right Updated:  05/05/18 1138     Sodium 135 (L) mmol/L      Potassium 6 1 (H) mmol/L      Chloride 102 mmol/L      CO2 31 mmol/L      Anion Gap 2 (L) mmol/L      BUN 19 mg/dL      Creatinine 1 18 mg/dL      Glucose 207 (H) mg/dL      Calcium 8 4 mg/dL      AST 98 (H) U/L      ALT 43 U/L      Alkaline Phosphatase 304 (H) U/L      Total Protein 8 0 g/dL      Albumin 1 5 (L) g/dL      Total Bilirubin 0 61 mg/dL      eGFR 89 ml/min/1 73sq m     Narrative:         National Kidney Disease Education Program recommendations are as follows:  GFR calculation is accurate only with a steady state creatinine  Chronic Kidney disease less than 60 ml/min/1 73 sq  meters  Kidney failure less than 15 ml/min/1 73 sq  meters  BNP [[id number]]  (Abnormal) Collected:  05/05/18 1054    Lab Status:  Final result Specimen:  Blood from Arm, Right Updated:  05/05/18 1138     NT-proBNP 2,340 (H) pg/mL     Troponin I [[id number]]  (Normal) Collected:  05/05/18 1054    Lab Status:  Final result Specimen:  Blood from Arm, Right Updated:  05/05/18 1120     Troponin I <0 02 ng/mL     Narrative:         [employer] Chemistry analyzer 99% cutoff is > 0 04 ng/mL in network labs    o cTnI 99% cutoff is useful only when applied to patients in the clinical setting of myocardial ischemia  o cTnI 99% cutoff should be interpreted in the context of clinical history, ECG findings and possibly cardiac imaging to establish correct diagnosis  o cTnI 99% cutoff may be suggestive but clearly not indicative of a coronary event without the clinical setting of myocardial ischemia  CBC and differential [[id number]]  (Abnormal) Collected:  05/05/18 1054    Lab Status:  Final result Specimen:  Blood from Arm, Right Updated:  05/05/18 1103     WBC 11 45 (H) Thousand/uL      RBC 3 34 (L) Million/uL      Hemoglobin 9 6 (L) g/dL      Hematocrit 29 8 (L) %      MCV 89 fL      MCH 28 7 pg      MCHC 32 2 g/dL      RDW 14 5 %      MPV 10 1 fL      Platelets 729 (H) Thousands/uL      nRBC 0 /100 WBCs      Neutrophils Relative 76 (H) %      Lymphocytes Relative 16 %      Monocytes Relative 6 %      Eosinophils Relative 2 %      Basophils Relative 0 %      Neutrophils Absolute 8 59 (H) Thousands/µL      Lymphocytes Absolute 1 84 Thousands/µL      Monocytes Absolute 0 70 Thousand/µL      Eosinophils Absolute 0 27 Thousand/µL      Basophils Absolute 0 05 Thousands/µL         No results for input(s): BNP in the last 72 hours  Results from last 7 days  Lab Units 05/05/18  1054   TROPONIN I ng/mL <0 02         Magnesium:   Results from last 7 days  Lab Units 05/06/18  0700   MAGNESIUM mg/dL 2 0             CXR: Cardiomegaly, diffuse pulm edema    EKG/TELE: testerday NSR pooor R wave progression LAFB, poor R wave progression non specific ST changes      I have personally reviewed the EKG, Echocardiogram, CXR and Telemetry images directly and the above is my interpretation

## 2018-05-06 NOTE — PROGRESS NOTES
Progress Note - Nomi Bellamy 34 y o  male MRN: [id number]    Unit/Bed#: E4 -01 Encounter: [id number]    Principal Problem:    Acute congestive heart failure (HCC)  Active Problems:    Hypertension    Disease of thyroid gland    Diabetes mellitus (HCC)    Hypokalemia    GARO (acute kidney injury) ([facility] )    Asthma    Hx of BKA, left (HCC)        Assessment/Plan:  · Acute CHF-likely diastolic from hypertension  new diagnosis  ? With mild overload- mild crackles in bases and mild THAI   ? Chest x-ray with suspected mild vascular congestion and small pleural effusions  ? Elevated BNP of 2340 with no prior  ? Will Obtain echocardiogram   ? Started on Lasix IV 20 mg -with good response  ? Will continue 20 mg IV Lasix twice daily given patient is Lasix naive  ? Monitor I&Os, daily weights, place on telemetry  ? Admission weight 238 lbs  ? Consult Cardiology      Additional Problems:   · Suspected GARO:  Creatinine 1 36 on admission which has now normalized to 1 28  [facility] Continue monitor in the setting of IV diuretics      · Type 2 diabetes with hyperglycemia:  Presenting with glucose of 227  Home regimen consists of Lantus 75 units before bed and Humalog 45 units  Will increase Lantus to 80 units and monitor  If needs higher doses may need to split the dose  Placed on insulin sliding scale     · Essential hypertension:  Presenting with elevated blood pressure is 176/80, 184/103  Home regimen of lisinopril 20 mg daily  Will hold here in the setting of GARO  Received one dose of IV labetalol 10 mg in ED  Will provide hydralazine 25 mg twice daily and nitropaste for better blood pressure control  Appreciate cardiology recommendations      Hypokalemia:  Potassium initially noted to be 6 1 however after neb treatment and repeat BMP K was 2 8  This is now normalized    · Hypothyroidism:  Continue Synthroid     · Status post left BKA     · Asthma:  Patient usually uses albuterol inhaler for his acute symptoms    He left his inhaler in [facility] and has not been able to use it for the past 4 to 5 months  He is not on any maintenance inhalers  Provide albuterol nebs here and inhaler  Will need one upon dc  No active wheezing or obvious exacerbation  Subjective:  Patient admitted with worsening shortness of breath and worsening leg swelling  X-ray chest reveals pulmonary vascular congestion and an elevated BNP  Patient has gained over 10 lb over the past 4-5 months  No prior history of congestive heart failure  Patient feels better after IV diuretics  Patient is from [facility] and plans to go back on the 21st of this month  Physical Exam:   Vitals: Blood pressure (!) 175/105, pulse 95, temperature 98 1 °F (36 7 °C), temperature source Tympanic, resp  rate 17, height 5' 2" (1 575 m), weight 103 kg (226 lb 3 1 oz), SpO2 96 %  ,Body mass index is 41 37 kg/m²  Gen:  Pleasant, non-tachypnic, non-dyspnic  Conversant  Heart: regular rate and rhythm, S1S2 present, bibasilar rales  Lungs: clear to ausculatation bilaterally  No wheezing, crackless, or rhonchi  No accessory muscle use or respiratory distress  Abd: soft, non-tender, non-distended  NABS, no guarding, rebound or peritoneal signs  Extremities: no clubbing, cyanosis   Left BKA  2+ edema right lower extremity  Neuro: awake, alert and oriented  Cranial nerves 2-12 intact  Strength and sensation grossly intact  Skin: warm and dry: no petechiae, purpura and rash      LABS:     Results from last 7 days  Lab Units 05/06/18  0700 05/05/18  1054   WBC Thousand/uL 10 68* 11 45*   HEMOGLOBIN g/dL 9 6* 9 6*   HEMATOCRIT % 30 3* 29 8*   PLATELETS Thousands/uL 403* 429*       Results from last 7 days  Lab Units 05/06/18  0700 05/05/18  1323 05/05/18  1054   SODIUM mmol/L 135* 138 135*   POTASSIUM mmol/L 3 5 2 8* 6 1*   CHLORIDE mmol/L 100 102 102   CO2 mmol/L 31 28 31   BUN mg/dL 26* 18 19   CREATININE mg/dL 1 29 1 36* 1 18   GLUCOSE RANDOM mg/dL 209* 227* 207* CALCIUM mg/dL 8 6 8 8 8 4       Intake/Output Summary (Last 24 hours) at 05/06/18 0901  Last data filed at 05/06/18 9400   Gross per 24 hour   Intake              420 ml   Output             2675 ml   Net            -2255 ml           Current Facility-Administered Medications:  acetaminophen 650 mg Oral Q4H PRN JESENIA Madison-ANDRÉS   albuterol 2 puff Inhalation Q4H PRN Socorro Moore PA-C   furosemide 20 mg Intravenous BID (diuretic) Keshia Dai PA-C   heparin (porcine) 5,000 Units Subcutaneous Q8H [address] Keshia Dai PA-C   hydrALAZINE 25 mg Oral BID Keshia Dai PA-C   insulin glargine 75 Units Subcutaneous HS Keshia Dai PA-C   insulin lispro 1-6 Units Subcutaneous TID AC JESENIA Garcia-ANDRÉS   insulin lispro 1-6 Units Subcutaneous HS JESENIA Garcia-ANDRÉS   insulin lispro 45 Units Subcutaneous Daily Keshia Dai PA-C   levalbuterol 1 25 mg Nebulization Q6H PRN Socorro Moore PA-C   levothyroxine 25 mcg Oral Early Morning Keshia Dai PA-C   nitroglycerin 1 inch Topical BID Keshia Dai PA-C   ondansetron 4 mg Intravenous Q6H PRN Socorro Moore PA-C       Family update- offered to update family-patient declined

## 2018-05-06 NOTE — CASE MANAGEMENT
Initial Clinical Review    Admission: Date/Time/Statement: 5/5/18 @ 1226     Orders Placed This Encounter   Procedures    Inpatient Admission (expected length of stay for this patient is greater than two midnights)     Standing Status:   Standing     Number of Occurrences:   1     Order Specific Question:   Admitting Physician     Answer:   KEITH IVY [[id number]]     Order Specific Question:   Level of Care     Answer:   Med Surg [[id number]]     Order Specific Question:   Estimated length of stay     Answer:   More than 2 Midnights     Order Specific Question:   Certification     Answer:   I certify that inpatient services are medically necessary for this patient for a duration of greater than two midnights  See H&P and MD Progress Notes for additional information about the patient's course of treatment  ED: Date/Time/Mode of Arrival:   ED Arrival Information     Expected Arrival Acuity Means of Arrival Escorted By Service Admission Type    - 5/5/2018 10:31 Urgent Walk-In Family Member General Medicine Urgent    Arrival Complaint    SOB          Chief Complaint:   Chief Complaint   Patient presents with    Shortness of Breath     Patient reports SOB X2 days  Patient denies CP  History of Illness: Roosevelt Jones is a 34 y o  male with past medical history of type 2 diabetes, essential hypertension, hypothyroidism, asthma, status post left BKA  Patient normally resides in [facility] however has been here visiting his daughter for the past 4-5 months  Patient leaves for [facility] again on May 21st      He presents with shortness of breath ongoing for the past 2 days  Patient notes he initially began with a tightness in his chest and felt it was difficult to catch his breath  Admits to gasping for air and being unable to lie flat in bed  Patient describes the experience as if he was drowning  He noticed his symptoms slightly improved and then worsened again today prompting him to seek further evaluation  Associated dry cough, increased swelling in his lower legs  He also notes approximately 20 lbs weight gain over the past 4-5 months  Pt uses the salt shaker daily- mostly for his dinner  Admits to eating canned foods at times  Denies history of CHF  Has never been on diuretics  Does not smoke, drink alcohol, or use drugs  Denies any palpitations, pain with inspiration, chest pain   No nausea vomiting, diarrhea or dysuria        ED Vital Signs:   ED Triage Vitals   Temperature Pulse Respirations Blood Pressure SpO2   05/05/18 1045 05/05/18 1044 05/05/18 1044 05/05/18 1044 05/05/18 1044   98 5 °F (36 9 °C) 93 22 (!) 184/99 99 %      Temp Source Heart Rate Source Patient Position - Orthostatic VS BP Location FiO2 (%)   05/05/18 1045 05/05/18 1044 05/05/18 1044 05/05/18 1212 --   Oral Monitor Lying Left arm       Pain Score       05/05/18 1212       8        Wt Readings from Last 1 Encounters:   05/06/18 103 kg (226 lb 3 1 oz)       Vital Signs (abnormal): above    Abnormal Labs/Diagnostic Test Results:    K   2 8  Creat   1 36  BS  227  Na    135  K  6 1  AG   2  Alk  Phos   304  Albumin   1 5  BNP   2,340  WBC   11 45  H/H   9 6/29 8  Abs  neutro   8 59  CXR:     Suspected mild vascular congestion and trace pleural effusions  ED Treatment:   Medication Administration from 05/05/2018 1031 to 05/05/2018 1340       Date/Time Order Dose Route Action Action by Comments     05/05/2018 1208 albuterol inhalation solution 5 mg 5 mg Nebulization Given Mica Holloway RN      05/05/2018 1208 ipratropium (ATROVENT) 0 02 % inhalation solution 0 5 mg 0 5 mg Nebulization Given Mica Holloway RN      05/05/2018 1208 furosemide (LASIX) injection 20 mg 20 mg Intravenous Given Mica Holloway RN      05/05/2018 1325 labetalol (NORMODYNE) injection 10 mg 10 mg Intravenous Given Rosa Lagunas RN      05/05/2018 1325 acetaminophen (TYLENOL) tablet 650 mg 650 mg Oral Given Rosa Lagunas RN           Past Medical/Surgical History: Active Ambulatory Problems     Diagnosis Date Noted    No Active Ambulatory Problems     Resolved Ambulatory Problems     Diagnosis Date Noted    No Resolved Ambulatory Problems     Past Medical History:   Diagnosis Date    Diabetes mellitus ([address] )     Disease of thyroid gland     Hypertension     Neuropathy        Admitting Diagnosis: Shortness of breath [R06 02]  CHF (congestive heart failure) ([facility]) [I50 9]  Dyspnea [R06 00]  Pleural effusion [J90]    Age/Sex: 34 y o  male    · Assessment/Plan:   Acute CHF- new diagnosis  ? With mild overload- mild crackles in bases and mild THAI   ? Chest x-ray with suspected mild vascular congestion and small pleural effusions  ? Elevated BNP of 2340 with no prior  ? Obtain echocardiogram   ? Started on Lasix IV 20 mg in ED  ? Will continue 20 mg IV Lasix twice daily given patient is Lasix naive  ? Monitor I&Os, daily weights, place on telemetry  ? Admission weight 238 lbs  ? Consult Cardiology      Additional Problems:   · Suspected GARO:  Creatinine 1 36  Unable to baseline previous creatinine  Continue monitor in the setting of IV diuretics      · Type 2 diabetes with hyperglycemia:  Presenting with glucose of 227  Home regimen consists of Lantus 75 units before bed and Humalog 45 units  Placed on insulin sliding scale     · Essential hypertension:  Presenting with elevated blood pressure is 176/80, 184/103  Home regimen of lisinopril 20 mg daily  Will hold here in the setting of GARO  Received one dose of IV labetalol 10 mg in ED  Will provide hydralazine 25 mg twice daily and nitropaste for better blood pressure control  Appreciate cardiology recommendations      Hypokalemia:  Potassium initially noted to be 6 1 however after neb treatment and repeat BMP K was 2 8  Will replete and continue to monitor    · Hypothyroidism:  Continue Synthroid     · Status post left BKA     · Asthma:  Patient usually uses albuterol inhaler for his acute symptoms  He left his inhaler in [facility] and has not been able to use it for the past 4 to 5 months  He is not on any maintenance inhalers  Provide albuterol nebs here and inhaler  Will need one upon dc  No active wheezing or obvious exacerbation  Anticipated Length of Stay:  Patient will be admitted on an Inpatient basis with an anticipated length of stay of  Greater than 2 midnights  Justification for Hospital Stay: acute chf with need for IV diuresis          Admission Orders:   IP   5/5  @    1226  Scheduled Meds:   Current Facility-Administered Medications:  acetaminophen 650 mg Oral Q4H PRN ChrisJESENIA Fisher-ANDRÉS   albuterol 2 puff Inhalation Q4H PRN Chris JESENIA Hicks-ANDRÉS   aspirin 81 mg Oral Daily Joy Goodwin MD   carvedilol 6 25 mg Oral BID With Meals Joy Goodwin MD   furosemide 20 mg Intravenous BID (diuretic) Chris Hicks PA-C   heparin (porcine) 5,000 Units Subcutaneous Q8H [address] JESENIA Garcia-ANDRÉS   hydrALAZINE 25 mg Oral BID JESENIA Garcia-ANDRÉS   insulin glargine 80 Units Subcutaneous HS Angel Rodriguez MD   insulin lispro 1-6 Units Subcutaneous TID AC JESENIA Garcia-ANDRÉS   insulin lispro 1-6 Units Subcutaneous HS JESENIA Garcia-ANDRÉS   insulin lispro 45 Units Subcutaneous Daily JESENIA Garcia-ANDRÉS   levalbuterol 1 25 mg Nebulization Q6H PRN ChrisJESENIA Fisher-C   levothyroxine 25 mcg Oral Early Morning JESENIA Garcia-ANDRÉS   lisinopril 5 mg Oral Daily Joy Goodwin MD   meloxicam 7 5 mg Oral Daily Eri Weber MD   nitroglycerin 1 inch Topical BID JESENIA Garcia-ANDRÉS   ondansetron 4 mg Intravenous Q6H PRN Keshia Dai PA-C     Continuous Infusions:    PRN Meds:   acetaminophen    albuterol    levalbuterol    ondansetron     Cardiac  Diet  Tele  CHF  Teaching  Cons cardiology    Per  Cardiology  Consult:  IMPRESSION:  1  Acute CHF, new dx, unknown EF, elevated BNP and edema, pulm edema   Only signs of possible CAD is poor R wave progression on EKG, and he is diabetic w h/o Left BKA, so certainly possible  2  Poorly controlled hypertension  3  Lives in [place], here on vacation, limited care available when he lives  4  GARO CR 1 36 to 1 28  5  Insulin dependent DMT2  6  Morbid obesity     PLAN:  1  Agree w lasix 20mg IV BID  2  STart carvedilol 6 25mg bid  3  Restart lisnopril 5mg (he was on this at home at 10mg he said)  4  F/u TTE tomorrow  5  IF EF down on TTE would do ischemic eval   , check lipids for now  6  Albumin is 1 6- Total protein 7 5---not sure if has Nephrotic syndrome or liver disease as cause of protein loss leading to edema? Will defer to primary team            Thank you,  [address] [facility] in the [facility] by Dagoberto Thomas for 2017  Network Utilization Review Department  Phone: [phone]; Fax [phone]  ATTENTION: The Network Utilization Review Department is now centralized for our 7 Facilities  Make a note that we have a new phone and fax numbers for our Department  Please call with any questions or concerns to [phone] and carefully follow the prompts so that you are directed to the right person  All voicemails are confidential  Fax any determinations, approvals, denials, and requests for initial or continue stay review clinical to [phone]   Due to HIGH CALL volume, it would be easier if you could please send faxed requests to expedite your requests and in part, help us provide discharge notifications faster

## 2018-05-07 ENCOUNTER — APPOINTMENT (INPATIENT)
Dept: NON INVASIVE DIAGNOSTICS | Facility: HOSPITAL | Age: 38
DRG: 292 | End: 2018-05-07

## 2018-05-07 LAB
ANION GAP SERPL CALCULATED.3IONS-SCNC: 6 MMOL/L (ref 4–13)
BUN SERPL-MCNC: 32 MG/DL (ref 5–25)
CALCIUM SERPL-MCNC: 8.5 MG/DL (ref 8.3–10.1)
CHLORIDE SERPL-SCNC: 100 MMOL/L (ref 100–108)
CHOLEST SERPL-MCNC: 149 MG/DL (ref 50–200)
CO2 SERPL-SCNC: 29 MMOL/L (ref 21–32)
CREAT SERPL-MCNC: 1.26 MG/DL (ref 0.6–1.3)
GFR SERPL CREATININE-BSD FRML MDRD: 82 ML/MIN/1.73SQ M
GLUCOSE SERPL-MCNC: 103 MG/DL (ref 65–140)
GLUCOSE SERPL-MCNC: 140 MG/DL (ref 65–140)
GLUCOSE SERPL-MCNC: 190 MG/DL (ref 65–140)
GLUCOSE SERPL-MCNC: 94 MG/DL (ref 65–140)
GLUCOSE SERPL-MCNC: 99 MG/DL (ref 65–140)
HDLC SERPL-MCNC: 32 MG/DL (ref 40–60)
LDLC SERPL CALC-MCNC: 89 MG/DL (ref 0–100)
PLATELET # BLD AUTO: 424 THOUSANDS/UL (ref 149–390)
PMV BLD AUTO: 9.8 FL (ref 8.9–12.7)
POTASSIUM SERPL-SCNC: 3.4 MMOL/L (ref 3.5–5.3)
SODIUM SERPL-SCNC: 135 MMOL/L (ref 136–145)
TRIGL SERPL-MCNC: 138 MG/DL

## 2018-05-07 PROCEDURE — 82948 REAGENT STRIP/BLOOD GLUCOSE: CPT

## 2018-05-07 PROCEDURE — 80061 LIPID PANEL: CPT | Performed by: INTERNAL MEDICINE

## 2018-05-07 PROCEDURE — 93306 TTE W/DOPPLER COMPLETE: CPT

## 2018-05-07 PROCEDURE — 85049 AUTOMATED PLATELET COUNT: CPT | Performed by: PHYSICIAN ASSISTANT

## 2018-05-07 PROCEDURE — 80048 BASIC METABOLIC PNL TOTAL CA: CPT | Performed by: HOSPITALIST

## 2018-05-07 PROCEDURE — 93306 TTE W/DOPPLER COMPLETE: CPT | Performed by: INTERNAL MEDICINE

## 2018-05-07 PROCEDURE — 99232 SBSQ HOSP IP/OBS MODERATE 35: CPT | Performed by: INTERNAL MEDICINE

## 2018-05-07 PROCEDURE — 99233 SBSQ HOSP IP/OBS HIGH 50: CPT | Performed by: INTERNAL MEDICINE

## 2018-05-07 RX ORDER — POTASSIUM CHLORIDE 20 MEQ/1
20 TABLET, EXTENDED RELEASE ORAL 2 TIMES DAILY
Status: DISCONTINUED | OUTPATIENT
Start: 2018-05-07 | End: 2018-05-08 | Stop reason: HOSPADM

## 2018-05-07 RX ORDER — FUROSEMIDE 10 MG/ML
40 INJECTION INTRAMUSCULAR; INTRAVENOUS
Status: DISCONTINUED | OUTPATIENT
Start: 2018-05-07 | End: 2018-05-08

## 2018-05-07 RX ADMIN — LEVOTHYROXINE SODIUM 25 MCG: 25 TABLET ORAL at 05:16

## 2018-05-07 RX ADMIN — LISINOPRIL 5 MG: 5 TABLET ORAL at 09:17

## 2018-05-07 RX ADMIN — ASPIRIN 81 MG 81 MG: 81 TABLET ORAL at 09:18

## 2018-05-07 RX ADMIN — FUROSEMIDE 40 MG: 10 INJECTION, SOLUTION INTRAMUSCULAR; INTRAVENOUS at 17:25

## 2018-05-07 RX ADMIN — CARVEDILOL 6.25 MG: 6.25 TABLET, FILM COATED ORAL at 09:18

## 2018-05-07 RX ADMIN — HEPARIN SODIUM 5000 UNITS: 5000 INJECTION, SOLUTION INTRAVENOUS; SUBCUTANEOUS at 14:49

## 2018-05-07 RX ADMIN — HYDRALAZINE HYDROCHLORIDE 25 MG: 25 TABLET, FILM COATED ORAL at 09:18

## 2018-05-07 RX ADMIN — NITROGLYCERIN 1 INCH: 20 OINTMENT TOPICAL at 17:25

## 2018-05-07 RX ADMIN — FUROSEMIDE 20 MG: 10 INJECTION, SOLUTION INTRAMUSCULAR; INTRAVENOUS at 09:17

## 2018-05-07 RX ADMIN — HYDRALAZINE HYDROCHLORIDE 25 MG: 25 TABLET, FILM COATED ORAL at 17:24

## 2018-05-07 RX ADMIN — INSULIN LISPRO 45 UNITS: 100 INJECTION, SOLUTION INTRAVENOUS; SUBCUTANEOUS at 09:16

## 2018-05-07 RX ADMIN — HEPARIN SODIUM 5000 UNITS: 5000 INJECTION, SOLUTION INTRAVENOUS; SUBCUTANEOUS at 21:28

## 2018-05-07 RX ADMIN — POTASSIUM CHLORIDE 20 MEQ: 1500 TABLET, EXTENDED RELEASE ORAL at 17:24

## 2018-05-07 RX ADMIN — INSULIN LISPRO 2 UNITS: 100 INJECTION, SOLUTION INTRAVENOUS; SUBCUTANEOUS at 21:29

## 2018-05-07 RX ADMIN — POTASSIUM CHLORIDE 20 MEQ: 1500 TABLET, EXTENDED RELEASE ORAL at 14:49

## 2018-05-07 RX ADMIN — HEPARIN SODIUM 5000 UNITS: 5000 INJECTION, SOLUTION INTRAVENOUS; SUBCUTANEOUS at 05:20

## 2018-05-07 RX ADMIN — INSULIN GLARGINE 80 UNITS: 100 INJECTION, SOLUTION SUBCUTANEOUS at 21:28

## 2018-05-07 RX ADMIN — OXYCODONE HYDROCHLORIDE 5 MG: 5 TABLET ORAL at 21:27

## 2018-05-07 RX ADMIN — OXYCODONE HYDROCHLORIDE 5 MG: 5 TABLET ORAL at 05:15

## 2018-05-07 RX ADMIN — MELOXICAM 7.5 MG: 7.5 TABLET ORAL at 09:18

## 2018-05-07 RX ADMIN — OXYCODONE HYDROCHLORIDE 5 MG: 5 TABLET ORAL at 05:16

## 2018-05-07 RX ADMIN — OXYCODONE HYDROCHLORIDE 5 MG: 5 TABLET ORAL at 14:55

## 2018-05-07 RX ADMIN — CARVEDILOL 6.25 MG: 6.25 TABLET, FILM COATED ORAL at 17:24

## 2018-05-07 RX ADMIN — NITROGLYCERIN 1 INCH: 20 OINTMENT TOPICAL at 09:19

## 2018-05-07 NOTE — PROGRESS NOTES
[facility] Progress Note  Patient: Arvind Kwan 34 y o  male   MRN: [id number]  PCP: No primary care provider on file  Unit/Bed#: E4 -01 Encounter: [id number]  Date Of Visit: 18    Assessment:    Principal Problem:    Acute congestive heart failure (HCC)  Active Problems:    Hypertension    Disease of thyroid gland    Diabetes mellitus (HCC)    Hypokalemia    GARO (acute kidney injury) ([facility] )    Asthma    Hx of BKA, left ([facility] )      Plan:  -appreciate cards input  IV lasix increased, monitor I/Os, weights ,BMP  F/u Echo  BP better  -replete K  -Accuchecks, c/w insulin  -Check UA for proteinuria        Subjective:   Feels well  Denies SOB  Has no complaints  Objective:     Vitals:   Temp (24hrs), Av 1 °F (36 7 °C), Min:97 6 °F (36 4 °C), Max:98 7 °F (37 1 °C)    HR:  [80-97] 86  Resp:  [17-18] 18  BP: (127-180)/() 135/98  SpO2:  [95 %-99 %] 98 %  Body mass index is 41 13 kg/m²  Input and Output Summary (last 24 hours):        Intake/Output Summary (Last 24 hours) at 18 1315  Last data filed at 18 1051   Gross per 24 hour   Intake              840 ml   Output             2650 ml   Net            -1810 ml       Physical Exam:     Physical Exam    GEN: NAD  HEENT: PERRL  CARDIO: s1 s2 RRR  LUNGS: CTA  ABD: Soft, NT/ND  EXT: edema RLL, left BKA       Additional Data:     Labs:      Results from last 7 days  Lab Units 18  0505 18  0700 18  1054   WBC Thousand/uL  --  10 68* 11 45*   HEMOGLOBIN g/dL  --  9 6* 9 6*   HEMATOCRIT %  --  30 3* 29 8*   PLATELETS Thousands/uL 424* 403* 429*   NEUTROS PCT %  --   --  76*   LYMPHS PCT %  --   --  16   MONOS PCT %  --   --  6   EOS PCT %  --   --  2       Results from last 7 days  Lab Units 18  0454 18  0700   SODIUM mmol/L 135* 135*   POTASSIUM mmol/L 3 4* 3 5   CHLORIDE mmol/L 100 100   CO2 mmol/L 29 31   BUN mg/dL 32* 26*   CREATININE mg/dL 1 26 1 29   CALCIUM mg/dL 8 5 8 6   TOTAL PROTEIN g/dL  --  7 5   BILIRUBIN TOTAL mg/dL  --  0 30   ALK PHOS U/L  --  275*   ALT U/L  --  33   AST U/L  --  34   GLUCOSE RANDOM mg/dL 140 209*           * I Have Reviewed All Lab Data Listed Above  * Additional Pertinent Lab Tests Reviewed: All Labs For Current Hospital Admission Reviewed    Imaging:    Imaging Reports Reviewed Today Include: All available     Recent Cultures (last 7 days):           Last 24 Hours Medication List:     Current Facility-Administered Medications:  acetaminophen 650 mg Oral Q4H PRN Steven Zelaya PA-C   albuterol 2 puff Inhalation Q4H PRN Steven Zelaya PA-C   aspirin 81 mg Oral Daily Praful Ya MD   carvedilol 6 25 mg Oral BID With Meals Praful Ya MD   furosemide 40 mg Intravenous BID (diuretic) Odette Baldwin DO   heparin (porcine) 5,000 Units Subcutaneous Q8H [address] Keshia Dai PA-C   hydrALAZINE 25 mg Oral BID Keshia Dai PA-C   insulin glargine 80 Units Subcutaneous HS Angel Rodriguez MD   insulin lispro 1-6 Units Subcutaneous TID AC Keshia Dai PA-C   insulin lispro 1-6 Units Subcutaneous HS JESENIA Garcia-ANDRÉS   insulin lispro 45 Units Subcutaneous Daily Keshia Dai PA-C   levalbuterol 1 25 mg Nebulization Q6H PRN Steven Zelaya PA-C   levothyroxine 25 mcg Oral Early Morning Keshia Dai PA-C   lisinopril 5 mg Oral Daily Praful Ya MD   meloxicam 7 5 mg Oral Daily Priscila Redd MD   nitroglycerin 1 inch Topical BID Keshia Dai PA-C   ondansetron 4 mg Intravenous Q6H PRN Steven Zelaya PA-C   oxyCODONE 5 mg Oral Q6H PRN Garett Mauro PA-C   potassium chloride 20 mEq Oral BID Odette Baldwin DO        Today, Patient Was Seen By: Raquel Triana MD    ** Please Note: This note has been constructed using a voice recognition system   **

## 2018-05-07 NOTE — PROGRESS NOTES
Progress Note - Cardiology   Marleny Shah 34 y o  male MRN: [id number]  Unit/Bed#: E4 -01 Encounter: [id number]      Assessment/Recommendations/Discussion:     1  Acute CHF, unknown EF at present  2  HTN  3  GARO/CKD  4  Morbidly obese    · Still edemetous RLE  Will increase lasix to 40mg IV BID--if BUN continue to rise or if Cr up, would transition to PO  · Echo pending  · BP improved, con't lisinopril and carvedilol      Subjective: Pt seen/examined  Feels well, less SOB        Physical Exam:  GEN:  NAD  HEENT:  MMM, NCAT, pink conjunctiva, EOMI, nonicteric sclera  CV:  NO JVD/HJR, RR, NO M/R/G, +S1/S2, NO PARASTERNAL HEAVE/THRILL, ++RLE EDEMA, NO HEPATIC SYSTOLIC PULSATION, WARM EXTREMITIES  RESP:  CTAB/L  ABD:  SOFT, NT, NO GROSS ORGANOMEGALY        Vitals:   /98 (BP Location: Left arm)   Pulse 86   Temp 97 6 °F (36 4 °C) (Tympanic)   Resp 18   Ht 5' 2" (1 575 m)   Wt 102 kg (224 lb 13 9 oz)   SpO2 98%   BMI 41 13 kg/m²   Vitals:    05/06/18 0600 05/07/18 0539   Weight: 103 kg (226 lb 3 1 oz) 102 kg (224 lb 13 9 oz)       Intake/Output Summary (Last 24 hours) at 05/07/18 1157  Last data filed at 05/07/18 1051   Gross per 24 hour   Intake             1320 ml   Output             2650 ml   Net            -1330 ml         TELEMETRY: SR  Lab Results:    Results from last 7 days  Lab Units 05/07/18  0505 05/06/18  0700   WBC Thousand/uL  --  10 68*   HEMOGLOBIN g/dL  --  9 6*   HEMATOCRIT %  --  30 3*   PLATELETS Thousands/uL 424* 403*       Results from last 7 days  Lab Units 05/07/18  0454 05/06/18  0700   SODIUM mmol/L 135* 135*   POTASSIUM mmol/L 3 4* 3 5   CHLORIDE mmol/L 100 100   CO2 mmol/L 29 31   BUN mg/dL 32* 26*   CREATININE mg/dL 1 26 1 29   CALCIUM mg/dL 8 5 8 6   TOTAL PROTEIN g/dL  --  7 5   BILIRUBIN TOTAL mg/dL  --  0 30   ALK PHOS U/L  --  275*   ALT U/L  --  33   AST U/L  --  34   GLUCOSE RANDOM mg/dL 140 209*       Results from last 7 days  Lab Units 05/07/18  0454   SODIUM mmol/L 135*   POTASSIUM mmol/L 3 4*   CHLORIDE mmol/L 100   CO2 mmol/L 29   BUN mg/dL 32*   CREATININE mg/dL 1 26   GLUCOSE RANDOM mg/dL 140   CALCIUM mg/dL 8 5       Results from last 7 days  Lab Units 05/07/18  0454   CHOLESTEROL mg/dL 149         Medications:    Current Facility-Administered Medications:     acetaminophen (TYLENOL) tablet 650 mg, 650 mg, Oral, Q4H PRN, Keshia Dai PA-C, 650 mg at 05/06/18 0735    albuterol (PROVENTIL HFA,VENTOLIN HFA) inhaler 2 puff, 2 puff, Inhalation, Q4H PRN, Stephon Mack PA-C    aspirin chewable tablet 81 mg, 81 mg, Oral, Daily, Stefan Kim MD, 81 mg at 05/07/18 9220    carvedilol (COREG) tablet 6 25 mg, 6 25 mg, Oral, BID With Meals, Stefan Kim MD, 6 25 mg at 05/07/18 9037    furosemide (LASIX) injection 20 mg, 20 mg, Intravenous, BID (diuretic), Keshia Dai PA-C, 20 mg at 05/07/18 0917    heparin (porcine) subcutaneous injection 5,000 Units, 5,000 Units, Subcutaneous, Q8H [facility], 5,000 Units at 05/07/18 0520 **AND** Platelet count, , , Once, Stephon Mack PA-C    hydrALAZINE (APRESOLINE) tablet 25 mg, 25 mg, Oral, BID, Keshia Dai PA-C, 25 mg at 05/07/18 0918    insulin glargine (LANTUS) subcutaneous injection 80 Units 0 8 mL, 80 Units, Subcutaneous, HS, Angel Rodriguez MD, 80 Units at 05/06/18 2144    insulin lispro (HumaLOG) 100 units/mL subcutaneous injection 1-6 Units, 1-6 Units, Subcutaneous, TID AC, 2 Units at 05/06/18 0828 **AND** Fingerstick Glucose (POCT), , , TID AC, Keshia Dai PA-C    insulin lispro (HumaLOG) 100 units/mL subcutaneous injection 1-6 Units, 1-6 Units, Subcutaneous, HS, Keshia Dai PA-C, 2 Units at 05/06/18 2144    insulin lispro (HumaLOG) 100 units/mL subcutaneous injection 45 Units, 45 Units, Subcutaneous, Daily, Keshia Dai PA-C, 45 Units at 05/07/18 0916    levalbuterol (XOPENEX) inhalation solution 1 25 mg, 1 25 mg, Nebulization, Q6H PRN, Stephon Mack PA-C    levothyroxine tablet 25 mcg, 25 mcg, Oral, Early Morning, Keshia Dai PA-C, 25 mcg at 05/07/18 0516    lisinopril (ZESTRIL) tablet 5 mg, 5 mg, Oral, Daily, Loreto Mckenzie MD, 5 mg at 05/07/18 0917    meloxicam (MOBIC) tablet 7 5 mg, 7 5 mg, Oral, Daily, Lesly Peter MD, 7 5 mg at 05/07/18 0918    nitroglycerin (NITRO-BID) 2 % TD ointment 1 inch, 1 inch, Topical, BID, Keshia Dai PA-C, 1 inch at 05/07/18 0919    ondansetron (ZOFRAN) injection 4 mg, 4 mg, Intravenous, Q6H PRN, Alyssa Britton PA-C    oxyCODONE (ROXICODONE) IR tablet 5 mg, 5 mg, Oral, Q6H PRN, Chintan Robles PA-C, 5 mg at 05/07/18 0511    Portions of the record may have been created with voice recognition software  Occasional wrong word or "sound a like" substitutions may have occurred due to the inherent limitations of voice recognition software  Read the chart carefully and recognize, using context, where substitutions have occurred

## 2018-05-08 VITALS
HEIGHT: 62 IN | WEIGHT: 218.7 LBS | HEART RATE: 92 BPM | BODY MASS INDEX: 40.25 KG/M2 | RESPIRATION RATE: 18 BRPM | DIASTOLIC BLOOD PRESSURE: 97 MMHG | SYSTOLIC BLOOD PRESSURE: 159 MMHG | TEMPERATURE: 98 F | OXYGEN SATURATION: 97 %

## 2018-05-08 LAB
ANION GAP SERPL CALCULATED.3IONS-SCNC: 6 MMOL/L (ref 4–13)
BACTERIA UR QL AUTO: ABNORMAL /HPF
BASOPHILS # BLD AUTO: 0.05 THOUSANDS/ΜL (ref 0–0.1)
BASOPHILS NFR BLD AUTO: 0 % (ref 0–1)
BILIRUB UR QL STRIP: NEGATIVE
BUN SERPL-MCNC: 34 MG/DL (ref 5–25)
CALCIUM SERPL-MCNC: 8.7 MG/DL (ref 8.3–10.1)
CHLORIDE SERPL-SCNC: 100 MMOL/L (ref 100–108)
CLARITY UR: ABNORMAL
CO2 SERPL-SCNC: 29 MMOL/L (ref 21–32)
COARSE GRAN CASTS URNS QL MICRO: ABNORMAL /LPF
COLOR UR: YELLOW
CREAT SERPL-MCNC: 1.34 MG/DL (ref 0.6–1.3)
EOSINOPHIL # BLD AUTO: 0.24 THOUSAND/ΜL (ref 0–0.61)
EOSINOPHIL NFR BLD AUTO: 2 % (ref 0–6)
ERYTHROCYTE [DISTWIDTH] IN BLOOD BY AUTOMATED COUNT: 14.7 % (ref 11.6–15.1)
GFR SERPL CREATININE-BSD FRML MDRD: 76 ML/MIN/1.73SQ M
GLUCOSE SERPL-MCNC: 111 MG/DL (ref 65–140)
GLUCOSE SERPL-MCNC: 118 MG/DL (ref 65–140)
GLUCOSE SERPL-MCNC: 93 MG/DL (ref 65–140)
GLUCOSE UR STRIP-MCNC: ABNORMAL MG/DL
HCT VFR BLD AUTO: 27.4 % (ref 36.5–49.3)
HGB BLD-MCNC: 8.7 G/DL (ref 12–17)
HGB UR QL STRIP.AUTO: ABNORMAL
HYALINE CASTS #/AREA URNS LPF: ABNORMAL /LPF
KETONES UR STRIP-MCNC: NEGATIVE MG/DL
LEUKOCYTE ESTERASE UR QL STRIP: NEGATIVE
LYMPHOCYTES # BLD AUTO: 1.98 THOUSANDS/ΜL (ref 0.6–4.47)
LYMPHOCYTES NFR BLD AUTO: 18 % (ref 14–44)
MCH RBC QN AUTO: 28.4 PG (ref 26.8–34.3)
MCHC RBC AUTO-ENTMCNC: 31.8 G/DL (ref 31.4–37.4)
MCV RBC AUTO: 90 FL (ref 82–98)
MONOCYTES # BLD AUTO: 0.72 THOUSAND/ΜL (ref 0.17–1.22)
MONOCYTES NFR BLD AUTO: 7 % (ref 4–12)
NEUTROPHILS # BLD AUTO: 8.17 THOUSANDS/ΜL (ref 1.85–7.62)
NEUTS SEG NFR BLD AUTO: 73 % (ref 43–75)
NITRITE UR QL STRIP: NEGATIVE
NON-SQ EPI CELLS URNS QL MICRO: ABNORMAL /HPF
NRBC BLD AUTO-RTO: 0 /100 WBCS
NT-PROBNP SERPL-MCNC: 1504 PG/ML
PH UR STRIP.AUTO: 6 [PH] (ref 4.5–8)
PLATELET # BLD AUTO: 425 THOUSANDS/UL (ref 149–390)
PMV BLD AUTO: 9.6 FL (ref 8.9–12.7)
POTASSIUM SERPL-SCNC: 3.7 MMOL/L (ref 3.5–5.3)
PROT UR STRIP-MCNC: >=300 MG/DL
RBC # BLD AUTO: 3.06 MILLION/UL (ref 3.88–5.62)
RBC #/AREA URNS AUTO: ABNORMAL /HPF
SODIUM SERPL-SCNC: 135 MMOL/L (ref 136–145)
SP GR UR STRIP.AUTO: 1.02 (ref 1–1.03)
UROBILINOGEN UR QL STRIP.AUTO: 0.2 E.U./DL
WBC # BLD AUTO: 11.16 THOUSAND/UL (ref 4.31–10.16)
WBC #/AREA URNS AUTO: ABNORMAL /HPF

## 2018-05-08 PROCEDURE — 85025 COMPLETE CBC W/AUTO DIFF WBC: CPT | Performed by: INTERNAL MEDICINE

## 2018-05-08 PROCEDURE — 82948 REAGENT STRIP/BLOOD GLUCOSE: CPT

## 2018-05-08 PROCEDURE — 99232 SBSQ HOSP IP/OBS MODERATE 35: CPT | Performed by: INTERNAL MEDICINE

## 2018-05-08 PROCEDURE — 81001 URINALYSIS AUTO W/SCOPE: CPT | Performed by: INTERNAL MEDICINE

## 2018-05-08 PROCEDURE — 83880 ASSAY OF NATRIURETIC PEPTIDE: CPT | Performed by: INTERNAL MEDICINE

## 2018-05-08 PROCEDURE — 99239 HOSP IP/OBS DSCHRG MGMT >30: CPT | Performed by: INTERNAL MEDICINE

## 2018-05-08 PROCEDURE — 80048 BASIC METABOLIC PNL TOTAL CA: CPT | Performed by: INTERNAL MEDICINE

## 2018-05-08 RX ORDER — LISINOPRIL 20 MG/1
TABLET ORAL
Refills: 0
Start: 2018-05-08 | End: 2018-05-16 | Stop reason: HOSPADM

## 2018-05-08 RX ORDER — CARVEDILOL 12.5 MG/1
12.5 TABLET ORAL 2 TIMES DAILY WITH MEALS
Qty: 60 TABLET | Refills: 0 | Status: ON HOLD | OUTPATIENT
Start: 2018-05-08 | End: 2018-05-16

## 2018-05-08 RX ORDER — FUROSEMIDE 40 MG/1
40 TABLET ORAL DAILY
Qty: 30 TABLET | Refills: 0 | Status: ON HOLD | OUTPATIENT
Start: 2018-05-09 | End: 2018-05-16

## 2018-05-08 RX ORDER — ASPIRIN 81 MG/1
81 TABLET, CHEWABLE ORAL DAILY
Qty: 30 TABLET | Refills: 0 | Status: SHIPPED | OUTPATIENT
Start: 2018-05-09

## 2018-05-08 RX ORDER — CARVEDILOL 12.5 MG/1
12.5 TABLET ORAL 2 TIMES DAILY WITH MEALS
Status: DISCONTINUED | OUTPATIENT
Start: 2018-05-08 | End: 2018-05-08 | Stop reason: HOSPADM

## 2018-05-08 RX ORDER — ATORVASTATIN CALCIUM 20 MG/1
20 TABLET, FILM COATED ORAL DAILY
Qty: 30 TABLET | Refills: 0 | Status: SHIPPED | OUTPATIENT
Start: 2018-05-08

## 2018-05-08 RX ORDER — FUROSEMIDE 40 MG/1
40 TABLET ORAL DAILY
Status: DISCONTINUED | OUTPATIENT
Start: 2018-05-08 | End: 2018-05-08 | Stop reason: HOSPADM

## 2018-05-08 RX ADMIN — ASPIRIN 81 MG 81 MG: 81 TABLET ORAL at 08:29

## 2018-05-08 RX ADMIN — LISINOPRIL 5 MG: 5 TABLET ORAL at 08:29

## 2018-05-08 RX ADMIN — LEVOTHYROXINE SODIUM 25 MCG: 25 TABLET ORAL at 05:09

## 2018-05-08 RX ADMIN — OXYCODONE HYDROCHLORIDE 5 MG: 5 TABLET ORAL at 04:17

## 2018-05-08 RX ADMIN — HYDRALAZINE HYDROCHLORIDE 25 MG: 25 TABLET, FILM COATED ORAL at 08:29

## 2018-05-08 RX ADMIN — NITROGLYCERIN 1 INCH: 20 OINTMENT TOPICAL at 08:30

## 2018-05-08 RX ADMIN — FUROSEMIDE 40 MG: 40 TABLET ORAL at 10:13

## 2018-05-08 RX ADMIN — HEPARIN SODIUM 5000 UNITS: 5000 INJECTION, SOLUTION INTRAVENOUS; SUBCUTANEOUS at 05:09

## 2018-05-08 RX ADMIN — CARVEDILOL 6.25 MG: 6.25 TABLET, FILM COATED ORAL at 08:39

## 2018-05-08 RX ADMIN — MELOXICAM 7.5 MG: 7.5 TABLET ORAL at 08:39

## 2018-05-08 RX ADMIN — POTASSIUM CHLORIDE 20 MEQ: 1500 TABLET, EXTENDED RELEASE ORAL at 08:30

## 2018-05-08 NOTE — DISCHARGE SUMMARY
Discharge Summary - [facility]    Patient Information: Madelaine Anthony 34 y o  male MRN: [id number]  Unit/Bed#: E4 -01 Encounter: [id number]    Discharging Physician / Practitioner: Tommy Humphries MD  PCP: No primary care provider on file  Admission Date: 5/5/2018  Discharge Date: 05/08/18    Disposition:     Home    Reason for Admission: CHF exacerbation     Discharge Diagnoses:     Principal Problem:    Acute congestive heart failure (HCC)  Active Problems:    Hypertension    Disease of thyroid gland    Diabetes mellitus (HCC)    Hypokalemia    GARO (acute kidney injury) ([facility] )    Asthma    Hx of BKA, left (HCC)  Resolved Problems:    * No resolved hospital problems  *      Consultations During Hospital Stay:  · Cardiology     Procedures Performed:     · None    Significant Findings / Test Results:   Echo:   SUMMARY     LEFT VENTRICLE:  Systolic function was normal  Ejection fraction was estimated to be 60 %  There were no regional wall motion abnormalities  Wall thickness was mildly increased  There was mild concentric hypertrophy  Features were consistent with a pseudonormal left ventricular filling pattern, with concomitant abnormal relaxation and increased filling pressure (grade 2 diastolic dysfunction)      LEFT ATRIUM:  The atrium was mildly dilated      MITRAL VALVE:  There was mild annular calcification  There was mild regurgitation      TRICUSPID VALVE:  There was mild to moderate regurgitation      PERICARDIUM:  A trace pericardial effusion was identified circumferential to the heart      HISTORY: PRIOR HISTORY: hypothyroid  Patient has no history of cardiovascular disease  Risk factors: hypertension, insulin-controlled diabetes, and morbid obesity      PROCEDURE: The procedure was performed at the bedside  This was a routine study  The transthoracic approach was used  The study included complete 2D imaging, M-mode, complete spectral Doppler, and color Doppler   The heart rate was 79 bpm,  at the start of the study  Image quality was adequate      LEFT VENTRICLE: Size was normal  Systolic function was normal  Ejection fraction was estimated to be 60 %  There were no regional wall motion abnormalities  Wall thickness was mildly increased  There was mild concentric hypertrophy  DOPPLER: Features were consistent with a pseudonormal left ventricular filling pattern, with concomitant abnormal relaxation and increased filling pressure (grade 2 diastolic dysfunction)      RIGHT VENTRICLE: The size was normal  Systolic function was normal  Wall thickness was normal      LEFT ATRIUM: The atrium was mildly dilated      RIGHT ATRIUM: Size was normal      MITRAL VALVE: There was mild annular calcification  DOPPLER: There was no evidence for stenosis  There was mild regurgitation      AORTIC VALVE: The valve was trileaflet  Leaflets exhibited normal thickness and normal cuspal separation  DOPPLER: Transaortic velocity was within the normal range  There was no evidence for stenosis  There was no regurgitation      TRICUSPID VALVE: The valve structure was normal  There was normal leaflet separation  DOPPLER: The transtricuspid velocity was within the normal range  There was no evidence for stenosis  There was mild to moderate regurgitation      PULMONIC VALVE: Leaflets exhibited normal thickness, no calcification, and normal cuspal separation  DOPPLER: The transpulmonic velocity was within the normal range  There was no regurgitation      PERICARDIUM: A trace pericardial effusion was identified circumferential to the heart      AORTA: The root exhibited normal size      SYSTEMIC VEINS: IVC: The inferior vena cava was normal in size and course  Respirophasic changes were normal      PULMONARY ARTERY: DOPPLER: Systolic pressure was within the normal range  Estimated peak pressure was in the range of 25 mmHg to 30 mmHg      Hospital Course:     Claudell Crape is a 34 y o  male patient who originally presented to the hospital on 5/5/2018 due to shortness of breath  Pt was admitted for acute CHF exacerbation  Pt was diuresed with IV lasix  He diuresed well  Weight on admission was 238lbs and on discharge was 218lbs  Pt was placed on 40mg PO lasix  Echo report is above  He was placed on ASA and Statin  His BP was high and medications were adjusted  He was discharged on coreg 12 5 BID, lasix 40 daily, lisinopril 10  He should have close follow up with a cardiologist  He should have a BMP checked to assess his kidney function and potassium level  Condition at Discharge: good     Discharge Day Visit / Exam:     Subjective:  No complaints  Feels much better  Vitals: Blood Pressure: 159/97 (05/08/18 0749)  Pulse: 92 (05/08/18 0749)  Temperature: 98 °F (36 7 °C) (05/08/18 0749)  Temp Source: Temporal (05/08/18 0749)  Respirations: 18 (05/08/18 0749)  Height: 5' 2" (157 5 cm) (05/05/18 1401)  Weight - Scale: 99 2 kg (218 lb 11 1 oz) (05/08/18 0326)  SpO2: 97 % (05/08/18 0749)  Exam:   Physical Exam  GEN: NAD  HEENT: PERRL  CARDIO: s1 s2 RRR  LUNGS: CTA  ABD: Soft, NT/ND  EXT: edema RLL, left BKA            Discharge instructions/Information to patient and family:   See after visit summary for information provided to patient and family  Provisions for Follow-Up Care:  See after visit summary for information related to follow-up care and any pertinent home health orders  Discharge Statement:  I spent 35 minutes discharging the patient  This time was spent on the day of discharge  I had direct contact with the patient on the day of discharge  Greater than 50% of the total time was spent examining patient, answering all patient questions, arranging and discussing plan of care with patient as well as directly providing post-discharge instructions  Additional time then spent on discharge activities      Discharge Medications:  See after visit summary for reconciled discharge medications provided to patient and family        ** Please Note: This note has been constructed using a voice recognition system **

## 2018-05-08 NOTE — PROGRESS NOTES
Progress Note - Cardiology   [facility] 34 y o  male MRN: [id number]  Unit/Bed#: E4 -01 Encounter: [id number]      Assessment/Recommendations/Discussion:     1  Acute CHF, unknown EF at present  2  HTN  3  GAOR/CKD  4  Morbidly obese    · Transition to furosemide 40 po daily  · Daily weight log strongly recommended  Pt given scale  · Pt asked to establish f/u with a cardiologist in [facility]  · Continue carvedilol, lisinopril, ASA  · Echo w/ normal LVEF ~ 60%, grade 2 DD  · OK for d/c today  D/W Dr Brian Ramirez      Subjective: Pt seen/examined  Feels well, no SOB  Edema softer        Physical Exam:  GEN:  NAD  HEENT:  MMM, NCAT, pink conjunctiva, EOMI, nonicteric sclera  CV:  NO JVD/HJR, RR, NO M/R/G, +S1/S2, NO PARASTERNAL HEAVE/THRILL, +RLE EDEMA, NO HEPATIC SYSTOLIC PULSATION, WARM EXTREMITIES  RESP:  CTAB/L  ABD:  SOFT, NT, NO GROSS ORGANOMEGALY        Vitals:   /97 (BP Location: Left arm)   Pulse 92   Temp 98 °F (36 7 °C) (Temporal)   Resp 18   Ht 5' 2" (1 575 m)   Wt 99 2 kg (218 lb 11 1 oz)   SpO2 97%   BMI 40 00 kg/m²   Vitals:    05/07/18 0539 05/08/18 0326   Weight: 102 kg (224 lb 13 9 oz) 99 2 kg (218 lb 11 1 oz)       Intake/Output Summary (Last 24 hours) at 05/08/18 1241  Last data filed at 05/07/18 2339   Gross per 24 hour   Intake                0 ml   Output             1580 ml   Net            -1580 ml           Lab Results:    Results from last 7 days  Lab Units 05/08/18  0450   WBC Thousand/uL 11 16*   HEMOGLOBIN g/dL 8 7*   HEMATOCRIT % 27 4*   PLATELETS Thousands/uL 425*       Results from last 7 days  Lab Units 05/08/18  0450  05/06/18  0700   SODIUM mmol/L 135*  < > 135*   POTASSIUM mmol/L 3 7  < > 3 5   CHLORIDE mmol/L 100  < > 100   CO2 mmol/L 29  < > 31   BUN mg/dL 34*  < > 26*   CREATININE mg/dL 1 34*  < > 1 29   CALCIUM mg/dL 8 7  < > 8 6   TOTAL PROTEIN g/dL  --   --  7 5   BILIRUBIN TOTAL mg/dL  --   --  0 30   ALK PHOS U/L  --   --  275*   ALT U/L  --   --  33 AST U/L  --   --  34   GLUCOSE RANDOM mg/dL 111  < > 209*   < > = values in this interval not displayed      Results from last 7 days  Lab Units 05/08/18  0450   SODIUM mmol/L 135*   POTASSIUM mmol/L 3 7   CHLORIDE mmol/L 100   CO2 mmol/L 29   BUN mg/dL 34*   CREATININE mg/dL 1 34*   GLUCOSE RANDOM mg/dL 111   CALCIUM mg/dL 8 7       Results from last 7 days  Lab Units 05/07/18  0454   CHOLESTEROL mg/dL 149         Medications:    Current Facility-Administered Medications:     acetaminophen (TYLENOL) tablet 650 mg, 650 mg, Oral, Q4H PRN, Keshia Dai PA-C, 650 mg at 05/06/18 0735    albuterol (PROVENTIL HFA,VENTOLIN HFA) inhaler 2 puff, 2 puff, Inhalation, Q4H PRN, Sharon Leroy PA-C    aspirin chewable tablet 81 mg, 81 mg, Oral, Daily, Viri Garcia MD, 81 mg at 05/08/18 0606    carvedilol (COREG) tablet 12 5 mg, 12 5 mg, Oral, BID With Meals, Tahira Pace MD    furosemide (LASIX) tablet 40 mg, 40 mg, Oral, Daily, Tahira Pace MD, 40 mg at 05/08/18 1013    heparin (porcine) subcutaneous injection 5,000 Units, 5,000 Units, Subcutaneous, Q8H [address], 5,000 Units at 05/08/18 0509 **AND** Platelet count, , , Once, Sharon Leroy PA-C    hydrALAZINE (APRESOLINE) tablet 25 mg, 25 mg, Oral, BID, Keshia Dai PA-C, 25 mg at 05/08/18 0829    insulin glargine (LANTUS) subcutaneous injection 80 Units 0 8 mL, 80 Units, Subcutaneous, HS, Angel Rodriguez MD, 80 Units at 05/07/18 2128    insulin lispro (HumaLOG) 100 units/mL subcutaneous injection 1-6 Units, 1-6 Units, Subcutaneous, TID AC, 2 Units at 05/06/18 0828 **AND** Fingerstick Glucose (POCT), , , TID AC, Keshia Dai PA-C    insulin lispro (HumaLOG) 100 units/mL subcutaneous injection 1-6 Units, 1-6 Units, Subcutaneous, HS, Keshia Dai PA-C, 2 Units at 05/07/18 2129    insulin lispro (HumaLOG) 100 units/mL subcutaneous injection 45 Units, 45 Units, Subcutaneous, Daily, Sharon Leroy PA-C, 45 Units at 05/07/18 0916    levothyroxine tablet 25 mcg, 25 mcg, Oral, Early Morning, Keshia Dai PA-C, 25 mcg at 05/08/18 0509    lisinopril (ZESTRIL) tablet 5 mg, 5 mg, Oral, Daily, Hailey Rico MD, 5 mg at 05/08/18 0829    meloxicam (MOBIC) tablet 7 5 mg, 7 5 mg, Oral, Daily, Yun Larsen MD, 7 5 mg at 05/08/18 0839    nitroglycerin (NITRO-BID) 2 % TD ointment 1 inch, 1 inch, Topical, BID, Keshia Dai PA-C, 1 inch at 05/08/18 0830    ondansetron (ZOFRAN) injection 4 mg, 4 mg, Intravenous, Q6H PRN, Bertram Bazan PA-C    oxyCODONE (ROXICODONE) IR tablet 5 mg, 5 mg, Oral, Q6H PRN, Jonna Blackwood PA-C, 5 mg at 05/08/18 0417    potassium chloride (K-DUR,KLOR-CON) CR tablet 20 mEq, 20 mEq, Oral, BID, Rujul Baldwin, DO, 20 mEq at 05/08/18 0830    Portions of the record may have been created with voice recognition software  Occasional wrong word or "sound a like" substitutions may have occurred due to the inherent limitations of voice recognition software  Read the chart carefully and recognize, using context, where substitutions have occurred

## 2018-05-08 NOTE — PLAN OF CARE
CARDIOVASCULAR - ADULT     Maintains optimal cardiac output and hemodynamic stability Progressing        DISCHARGE PLANNING     Discharge to home or other facility with appropriate resources Progressing        DISCHARGE PLANNING - CARE MANAGEMENT     Discharge to post-acute care or home with appropriate resources Progressing        INFECTION - ADULT     Absence or prevention of progression during hospitalization Progressing        Knowledge Deficit     Patient/family/caregiver demonstrates understanding of disease process, treatment plan, medications, and discharge instructions Progressing        METABOLIC, FLUID AND ELECTROLYTES - ADULT     Electrolytes maintained within normal limits Progressing     Fluid balance maintained Progressing        Potential for Falls     Patient will remain free of falls Progressing        Prexisting or High Potential for Compromised Skin Integrity     Skin integrity is maintained or improved Progressing        SAFETY ADULT     Patient will remain free of falls Progressing

## 2018-05-08 NOTE — PLAN OF CARE
CARDIOVASCULAR - ADULT     Maintains optimal cardiac output and hemodynamic stability Adequate for Discharge        DISCHARGE PLANNING     Discharge to home or other facility with appropriate resources Adequate for Discharge        DISCHARGE PLANNING - CARE MANAGEMENT     Discharge to post-acute care or home with appropriate resources Adequate for Discharge        INFECTION - ADULT     Absence or prevention of progression during hospitalization Adequate for Discharge        Knowledge Deficit     Patient/family/caregiver demonstrates understanding of disease process, treatment plan, medications, and discharge instructions Adequate for Discharge        METABOLIC, FLUID AND ELECTROLYTES - ADULT     Electrolytes maintained within normal limits Adequate for Discharge     Fluid balance maintained Adequate for Discharge        Potential for Falls     Patient will remain free of falls Adequate for Discharge        Prexisting or High Potential for Compromised Skin Integrity     Skin integrity is maintained or improved Adequate for Discharge        SAFETY ADULT     Patient will remain free of falls Adequate for Discharge

## 2018-05-09 ENCOUNTER — APPOINTMENT (INPATIENT)
Dept: RADIOLOGY | Facility: HOSPITAL | Age: 38
DRG: 564 | End: 2018-05-09

## 2018-05-09 ENCOUNTER — APPOINTMENT (EMERGENCY)
Dept: RADIOLOGY | Facility: HOSPITAL | Age: 38
DRG: 564 | End: 2018-05-09

## 2018-05-09 ENCOUNTER — APPOINTMENT (EMERGENCY)
Dept: NON INVASIVE DIAGNOSTICS | Facility: HOSPITAL | Age: 38
DRG: 564 | End: 2018-05-09

## 2018-05-09 ENCOUNTER — APPOINTMENT (EMERGENCY)
Dept: CT IMAGING | Facility: HOSPITAL | Age: 38
DRG: 564 | End: 2018-05-09

## 2018-05-09 ENCOUNTER — HOSPITAL ENCOUNTER (INPATIENT)
Facility: HOSPITAL | Age: 38
LOS: 7 days | Discharge: HOME | DRG: 564 | End: 2018-05-16
Attending: EMERGENCY MEDICINE | Admitting: INTERNAL MEDICINE

## 2018-05-09 DIAGNOSIS — I50.9 CHF (CONGESTIVE HEART FAILURE) (HCC): ICD-10-CM

## 2018-05-09 DIAGNOSIS — T14.8XXA MULTIPLE SKIN TEARS: ICD-10-CM

## 2018-05-09 DIAGNOSIS — D63.8 ANEMIA OF CHRONIC DISEASE: ICD-10-CM

## 2018-05-09 DIAGNOSIS — M79.652 LEFT THIGH PAIN: Primary | ICD-10-CM

## 2018-05-09 DIAGNOSIS — M62.82 RHABDOMYOLYSIS: ICD-10-CM

## 2018-05-09 DIAGNOSIS — I50.9 ACUTE CONGESTIVE HEART FAILURE, UNSPECIFIED HEART FAILURE TYPE (HCC): ICD-10-CM

## 2018-05-09 DIAGNOSIS — IMO0001 INSULIN DEPENDENT DIABETES MELLITUS: ICD-10-CM

## 2018-05-09 DIAGNOSIS — N17.9 AKI (ACUTE KIDNEY INJURY) (HCC): ICD-10-CM

## 2018-05-09 DIAGNOSIS — K92.1 BLACK STOOL: ICD-10-CM

## 2018-05-09 PROBLEM — E78.5 HYPERLIPIDEMIA: Status: ACTIVE | Noted: 2018-05-09

## 2018-05-09 PROBLEM — T79.6XXA TRAUMATIC RHABDOMYOLYSIS (HCC): Status: ACTIVE | Noted: 2018-05-09

## 2018-05-09 PROBLEM — E66.01 MORBID OBESITY (HCC): Status: ACTIVE | Noted: 2018-05-09

## 2018-05-09 LAB
ANION GAP SERPL CALCULATED.3IONS-SCNC: 9 MMOL/L (ref 4–13)
APTT PPP: 35 SECONDS (ref 23–35)
BASOPHILS # BLD AUTO: 0.02 THOUSANDS/ΜL (ref 0–0.1)
BASOPHILS NFR BLD AUTO: 0 % (ref 0–1)
BUN SERPL-MCNC: 36 MG/DL (ref 5–25)
CALCIUM SERPL-MCNC: 8.4 MG/DL (ref 8.3–10.1)
CHLORIDE SERPL-SCNC: 98 MMOL/L (ref 100–108)
CK MB SERPL-MCNC: 11.1 NG/ML (ref 0–5)
CK MB SERPL-MCNC: <1 % (ref 0–2.5)
CK SERPL-CCNC: 1277 U/L (ref 39–308)
CO2 SERPL-SCNC: 28 MMOL/L (ref 21–32)
CREAT SERPL-MCNC: 1.49 MG/DL (ref 0.6–1.3)
EOSINOPHIL # BLD AUTO: 0.04 THOUSAND/ΜL (ref 0–0.61)
EOSINOPHIL NFR BLD AUTO: 0 % (ref 0–6)
ERYTHROCYTE [DISTWIDTH] IN BLOOD BY AUTOMATED COUNT: 14.7 % (ref 11.6–15.1)
GFR SERPL CREATININE-BSD FRML MDRD: 67 ML/MIN/1.73SQ M
GLUCOSE SERPL-MCNC: 128 MG/DL (ref 65–140)
GLUCOSE SERPL-MCNC: 149 MG/DL (ref 65–140)
HCT VFR BLD AUTO: 27.4 % (ref 36.5–49.3)
HGB BLD-MCNC: 9 G/DL (ref 12–17)
INR PPP: 1.02 (ref 0.86–1.16)
LYMPHOCYTES # BLD AUTO: 1.14 THOUSANDS/ΜL (ref 0.6–4.47)
LYMPHOCYTES NFR BLD AUTO: 9 % (ref 14–44)
MCH RBC QN AUTO: 29.1 PG (ref 26.8–34.3)
MCHC RBC AUTO-ENTMCNC: 32.8 G/DL (ref 31.4–37.4)
MCV RBC AUTO: 89 FL (ref 82–98)
MONOCYTES # BLD AUTO: 0.66 THOUSAND/ΜL (ref 0.17–1.22)
MONOCYTES NFR BLD AUTO: 5 % (ref 4–12)
NEUTROPHILS # BLD AUTO: 10.55 THOUSANDS/ΜL (ref 1.85–7.62)
NEUTS SEG NFR BLD AUTO: 86 % (ref 43–75)
NRBC BLD AUTO-RTO: 0 /100 WBCS
PLATELET # BLD AUTO: 494 THOUSANDS/UL (ref 149–390)
PMV BLD AUTO: 9.6 FL (ref 8.9–12.7)
POTASSIUM SERPL-SCNC: 4.7 MMOL/L (ref 3.5–5.3)
PROTHROMBIN TIME: 13.4 SECONDS (ref 12.1–14.4)
RBC # BLD AUTO: 3.09 MILLION/UL (ref 3.88–5.62)
SODIUM SERPL-SCNC: 135 MMOL/L (ref 136–145)
TSH SERPL DL<=0.05 MIU/L-ACNC: 3.65 UIU/ML (ref 0.36–3.74)
WBC # BLD AUTO: 12.41 THOUSAND/UL (ref 4.31–10.16)

## 2018-05-09 PROCEDURE — 82948 REAGENT STRIP/BLOOD GLUCOSE: CPT

## 2018-05-09 PROCEDURE — 82550 ASSAY OF CK (CPK): CPT | Performed by: EMERGENCY MEDICINE

## 2018-05-09 PROCEDURE — 73700 CT LOWER EXTREMITY W/O DYE: CPT

## 2018-05-09 PROCEDURE — 36415 COLL VENOUS BLD VENIPUNCTURE: CPT | Performed by: EMERGENCY MEDICINE

## 2018-05-09 PROCEDURE — 85610 PROTHROMBIN TIME: CPT | Performed by: EMERGENCY MEDICINE

## 2018-05-09 PROCEDURE — 73552 X-RAY EXAM OF FEMUR 2/>: CPT

## 2018-05-09 PROCEDURE — 85730 THROMBOPLASTIN TIME PARTIAL: CPT | Performed by: EMERGENCY MEDICINE

## 2018-05-09 PROCEDURE — 80048 BASIC METABOLIC PNL TOTAL CA: CPT | Performed by: EMERGENCY MEDICINE

## 2018-05-09 PROCEDURE — 99285 EMERGENCY DEPT VISIT HI MDM: CPT

## 2018-05-09 PROCEDURE — 84443 ASSAY THYROID STIM HORMONE: CPT | Performed by: INTERNAL MEDICINE

## 2018-05-09 PROCEDURE — 71045 X-RAY EXAM CHEST 1 VIEW: CPT

## 2018-05-09 PROCEDURE — 96374 THER/PROPH/DIAG INJ IV PUSH: CPT

## 2018-05-09 PROCEDURE — 85025 COMPLETE CBC W/AUTO DIFF WBC: CPT | Performed by: EMERGENCY MEDICINE

## 2018-05-09 PROCEDURE — 96375 TX/PRO/DX INJ NEW DRUG ADDON: CPT

## 2018-05-09 PROCEDURE — 82553 CREATINE MB FRACTION: CPT | Performed by: EMERGENCY MEDICINE

## 2018-05-09 PROCEDURE — 93971 EXTREMITY STUDY: CPT

## 2018-05-09 PROCEDURE — 99223 1ST HOSP IP/OBS HIGH 75: CPT | Performed by: INTERNAL MEDICINE

## 2018-05-09 PROCEDURE — 96361 HYDRATE IV INFUSION ADD-ON: CPT

## 2018-05-09 RX ORDER — OMEGA-3S/DHA/EPA/FISH OIL/D3 300MG-1000
400 CAPSULE ORAL DAILY
Status: DISCONTINUED | OUTPATIENT
Start: 2018-05-10 | End: 2018-05-16 | Stop reason: HOSPADM

## 2018-05-09 RX ORDER — SODIUM CHLORIDE 9 MG/ML
75 INJECTION, SOLUTION INTRAVENOUS CONTINUOUS
Status: DISCONTINUED | OUTPATIENT
Start: 2018-05-09 | End: 2018-05-10

## 2018-05-09 RX ORDER — ATORVASTATIN CALCIUM 20 MG/1
20 TABLET, FILM COATED ORAL
Status: DISCONTINUED | OUTPATIENT
Start: 2018-05-09 | End: 2018-05-14

## 2018-05-09 RX ORDER — OXYCODONE HYDROCHLORIDE 10 MG/1
10 TABLET ORAL EVERY 4 HOURS PRN
Status: DISCONTINUED | OUTPATIENT
Start: 2018-05-09 | End: 2018-05-10

## 2018-05-09 RX ORDER — LEVOTHYROXINE SODIUM 0.03 MG/1
25 TABLET ORAL
Status: DISCONTINUED | OUTPATIENT
Start: 2018-05-10 | End: 2018-05-16 | Stop reason: HOSPADM

## 2018-05-09 RX ORDER — OXYCODONE HYDROCHLORIDE 5 MG/1
5 TABLET ORAL EVERY 4 HOURS PRN
Status: DISCONTINUED | OUTPATIENT
Start: 2018-05-09 | End: 2018-05-10

## 2018-05-09 RX ORDER — ASPIRIN 81 MG/1
81 TABLET, CHEWABLE ORAL DAILY
Status: DISCONTINUED | OUTPATIENT
Start: 2018-05-10 | End: 2018-05-16 | Stop reason: HOSPADM

## 2018-05-09 RX ORDER — OXYCODONE HYDROCHLORIDE AND ACETAMINOPHEN 5; 325 MG/1; MG/1
1 TABLET ORAL EVERY 4 HOURS PRN
Status: ON HOLD | COMMUNITY
End: 2018-05-16

## 2018-05-09 RX ORDER — CARVEDILOL 6.25 MG/1
12.5 TABLET ORAL 2 TIMES DAILY WITH MEALS
Status: DISCONTINUED | OUTPATIENT
Start: 2018-05-10 | End: 2018-05-14

## 2018-05-09 RX ORDER — FUROSEMIDE 10 MG/ML
40 INJECTION INTRAMUSCULAR; INTRAVENOUS DAILY
Status: DISCONTINUED | OUTPATIENT
Start: 2018-05-10 | End: 2018-05-10

## 2018-05-09 RX ORDER — MORPHINE SULFATE 2 MG/ML
2 INJECTION, SOLUTION INTRAMUSCULAR; INTRAVENOUS EVERY 4 HOURS PRN
Status: DISCONTINUED | OUTPATIENT
Start: 2018-05-09 | End: 2018-05-10

## 2018-05-09 RX ORDER — HEPARIN SODIUM 5000 [USP'U]/ML
5000 INJECTION, SOLUTION INTRAVENOUS; SUBCUTANEOUS EVERY 8 HOURS SCHEDULED
Status: DISCONTINUED | OUTPATIENT
Start: 2018-05-09 | End: 2018-05-16 | Stop reason: HOSPADM

## 2018-05-09 RX ORDER — FUROSEMIDE 40 MG/1
40 TABLET ORAL DAILY
Status: DISCONTINUED | OUTPATIENT
Start: 2018-05-10 | End: 2018-05-09

## 2018-05-09 RX ORDER — ALBUTEROL SULFATE 2.5 MG/3ML
2.5 SOLUTION RESPIRATORY (INHALATION) EVERY 4 HOURS PRN
Status: DISCONTINUED | OUTPATIENT
Start: 2018-05-09 | End: 2018-05-13

## 2018-05-09 RX ORDER — INSULIN GLARGINE 100 [IU]/ML
75 INJECTION, SOLUTION SUBCUTANEOUS
Status: DISCONTINUED | OUTPATIENT
Start: 2018-05-09 | End: 2018-05-14

## 2018-05-09 RX ORDER — ACETAMINOPHEN 325 MG/1
650 TABLET ORAL EVERY 6 HOURS PRN
Status: DISCONTINUED | OUTPATIENT
Start: 2018-05-09 | End: 2018-05-16 | Stop reason: HOSPADM

## 2018-05-09 RX ORDER — ONDANSETRON 2 MG/ML
4 INJECTION INTRAMUSCULAR; INTRAVENOUS EVERY 4 HOURS PRN
Status: DISCONTINUED | OUTPATIENT
Start: 2018-05-09 | End: 2018-05-16 | Stop reason: HOSPADM

## 2018-05-09 RX ORDER — GINSENG 100 MG
1 CAPSULE ORAL 2 TIMES DAILY
Status: DISCONTINUED | OUTPATIENT
Start: 2018-05-09 | End: 2018-05-16 | Stop reason: HOSPADM

## 2018-05-09 RX ORDER — ONDANSETRON 2 MG/ML
4 INJECTION INTRAMUSCULAR; INTRAVENOUS ONCE
Status: COMPLETED | OUTPATIENT
Start: 2018-05-09 | End: 2018-05-09

## 2018-05-09 RX ORDER — MORPHINE SULFATE 4 MG/ML
4 INJECTION, SOLUTION INTRAMUSCULAR; INTRAVENOUS ONCE
Status: COMPLETED | OUTPATIENT
Start: 2018-05-09 | End: 2018-05-09

## 2018-05-09 RX ORDER — OXYCODONE HYDROCHLORIDE 5 MG/1
5 TABLET ORAL EVERY 4 HOURS PRN
Status: DISCONTINUED | OUTPATIENT
Start: 2018-05-09 | End: 2018-05-09

## 2018-05-09 RX ADMIN — ATORVASTATIN CALCIUM 20 MG: 20 TABLET, FILM COATED ORAL at 19:51

## 2018-05-09 RX ADMIN — ONDANSETRON 4 MG: 2 INJECTION INTRAMUSCULAR; INTRAVENOUS at 14:50

## 2018-05-09 RX ADMIN — HYDROMORPHONE HYDROCHLORIDE 1 MG: 1 INJECTION, SOLUTION INTRAMUSCULAR; INTRAVENOUS; SUBCUTANEOUS at 17:24

## 2018-05-09 RX ADMIN — SODIUM CHLORIDE 75 ML/HR: 0.9 INJECTION, SOLUTION INTRAVENOUS at 19:51

## 2018-05-09 RX ADMIN — HEPARIN SODIUM 5000 UNITS: 5000 INJECTION, SOLUTION INTRAVENOUS; SUBCUTANEOUS at 21:58

## 2018-05-09 RX ADMIN — OXYCODONE HYDROCHLORIDE 5 MG: 5 TABLET ORAL at 20:55

## 2018-05-09 RX ADMIN — MORPHINE SULFATE 4 MG: 4 INJECTION INTRAVENOUS at 14:52

## 2018-05-09 RX ADMIN — SODIUM CHLORIDE 250 ML: 0.9 INJECTION, SOLUTION INTRAVENOUS at 15:46

## 2018-05-09 RX ADMIN — BACITRACIN ZINC 1 LARGE APPLICATION: 500 OINTMENT TOPICAL at 20:58

## 2018-05-09 RX ADMIN — HYDROMORPHONE HYDROCHLORIDE 0.5 MG: 1 INJECTION, SOLUTION INTRAMUSCULAR; INTRAVENOUS; SUBCUTANEOUS at 23:46

## 2018-05-09 RX ADMIN — MORPHINE SULFATE 2 MG: 2 INJECTION, SOLUTION INTRAMUSCULAR; INTRAVENOUS at 22:38

## 2018-05-09 NOTE — ED PROVIDER NOTES
History  Chief Complaint   Patient presents with    Leg Pain     pt fell out of chair and onto floor  pt left BKA and fell onto stump, now c/o pain to left thigh  abrasions noted to bilateral knees which pt had on previous visit per other ED staff  pt just d/c yesterday from [address]  denies LOC or blood thinners  History provided by:  Patient   used: No    Leg Pain   Location:  Leg  Time since incident:  4 hours  Lower extremity injury: possible  Leg location:  L leg  Pain details:     Quality:  Aching    Radiates to:  Does not radiate    Severity:  Moderate    Onset quality:  Gradual    Duration:  4 hours    Timing:  Intermittent    Progression:  Waxing and waning  Chronicity:  New  Dislocation: no    Foreign body present:  Unable to specify  Tetanus status:  Unknown  Prior injury to area:  Unable to specify  Relieved by:  Nothing  Worsened by:  Nothing  Ineffective treatments:  None tried  Associated symptoms: stiffness and swelling    Associated symptoms: no back pain, no fever and no neck pain    Swelling:     Location:  Leg (Left thigh)    Onset quality:  Unable to specify    Duration:  4 hours    Timing:  Intermittent    Progression:  Unchanged    Chronicity:  New  Risk factors: recent illness    Risk factors comment:  CHF, DM, HTN, BKA LLE      Prior to Admission Medications   Prescriptions Last Dose Informant Patient Reported? Taking?    Cholecalciferol (VITAMIN D PO)   Yes Yes   Sig: Take 1 tablet by mouth daily     Levothyroxine Sodium (SYNTHROID PO)   Yes Yes   Sig: Take 20 mcg by mouth daily   aspirin 81 mg chewable tablet   No No   Sig: Chew 1 tablet (81 mg total) daily   atorvastatin (LIPITOR) 20 mg tablet   No No   Sig: Take 1 tablet (20 mg total) by mouth daily   carvedilol (COREG) 12 5 mg tablet   No No   Sig: Take 1 tablet (12 5 mg total) by mouth 2 (two) times a day with meals   furosemide (LASIX) 40 mg tablet   No No   Sig: Take 1 tablet (40 mg total) by mouth daily insulin glargine (LANTUS) 100 units/mL subcutaneous injection   Yes Yes   Sig: Inject 75 Units under the skin daily at bedtime   insulin lispro (HumaLOG) 100 units/mL injection   Yes Yes   Sig: Inject 45 Units under the skin daily   insulin lispro (HumaLOG) 100 units/mL injection   Yes Yes   Sig: Inject under the skin daily at bedtime as needed for high blood sugar   lisinopril (ZESTRIL) 20 mg tablet   No Yes   Sig: Take 1/2 tab daily (10mg)      Facility-Administered Medications: None       Past Medical History:   Diagnosis Date    Diabetes mellitus ([facility] [address] )     Disease of thyroid gland     Hypertension     Neuropathy        Past Surgical History:   Procedure Laterality Date    LEG AMPUTATION      LEG SURGERY         History reviewed  No pertinent family history  I have reviewed and agree with the history as documented  Social History   Substance Use Topics    Smoking status: Never Smoker    Smokeless tobacco: Never Used    Alcohol use No        Review of Systems   Constitutional: Negative for activity change, chills and fever  HENT: Negative for facial swelling, sore throat and trouble swallowing  Eyes: Negative for pain and visual disturbance  Respiratory: Negative for cough, chest tightness and shortness of breath  Cardiovascular: Negative for chest pain and leg swelling  Gastrointestinal: Negative for abdominal pain, blood in stool, diarrhea, nausea and vomiting  Genitourinary: Negative for dysuria and flank pain  Musculoskeletal: Positive for stiffness  Negative for back pain, neck pain and neck stiffness  Skin: Negative for pallor and rash  Allergic/Immunologic: Negative for environmental allergies and immunocompromised state  Neurological: Negative for dizziness and headaches  Hematological: Negative for adenopathy  Does not bruise/bleed easily  Psychiatric/Behavioral: Negative for agitation and behavioral problems     All other systems reviewed and are negative  Physical Exam  ED Triage Vitals   Temperature Pulse Respirations Blood Pressure SpO2   05/09/18 1246 05/09/18 1246 05/09/18 1246 05/09/18 1246 05/09/18 1246   98 2 °F (36 8 °C) 97 20 (!) 153/102 95 %      Temp Source Heart Rate Source Patient Position - Orthostatic VS BP Location FiO2 (%)   05/09/18 1246 05/09/18 1659 05/09/18 1659 05/09/18 1659 --   Oral Monitor Lying Left arm       Pain Score       05/09/18 1246       Worst Possible Pain           Orthostatic Vital Signs  Vitals:    05/09/18 1246 05/09/18 1455 05/09/18 1659 05/09/18 1731   BP: (!) 153/102 140/68 158/85 148/91   Pulse: 97 105 105 99   Patient Position - Orthostatic VS:   Lying Lying       Physical Exam   Constitutional: He is oriented to person, place, and time  He appears well-developed and well-nourished  No distress  HENT:   Head: Normocephalic and atraumatic  Eyes: EOM are normal    Neck: Normal range of motion  Neck supple  Cardiovascular: Normal rate, regular rhythm, normal heart sounds and intact distal pulses  Pulmonary/Chest: Effort normal and breath sounds normal    Abdominal: Soft  Bowel sounds are normal  There is no tenderness  There is no rebound and no guarding  Musculoskeletal:   Left thigh swelling and tenderness, no erythema; old wound at left anterior knee, no active drainage or erythema   Neurological: He is alert and oriented to person, place, and time  Skin: Skin is warm and dry  Psychiatric: He has a normal mood and affect  Nursing note and vitals reviewed        ED Medications  Medications   morphine (PF) 4 mg/mL injection 4 mg (4 mg Intravenous Given 5/9/18 1452)   ondansetron (ZOFRAN) injection 4 mg (4 mg Intravenous Given 5/9/18 1450)   sodium chloride 0 9 % bolus 250 mL (0 mL Intravenous Stopped 5/9/18 1708)   HYDROmorphone (DILAUDID) injection 1 mg (1 mg Intravenous Given 5/9/18 1724)       Diagnostic Studies  Results Reviewed     Procedure Component Value Units Date/Time    CKMB [[id number]]  (Abnormal) Collected:  05/09/18 1449    Lab Status:  Final result Specimen:  Blood from Arm, Right Updated:  05/09/18 1559     CK-MB Index <1 0 %      CK-MB FRACTION 11 1 (H) ng/mL     CK Total with Reflex CKMB [[id number]]  (Abnormal) Collected:  05/09/18 1449    Lab Status:  Final result Specimen:  Blood from Arm, Right Updated:  05/09/18 1554     Total CK 1,277 (H) U/L     Protime-INR [[id number]]  (Normal) Collected:  05/09/18 1449    Lab Status:  Final result Specimen:  Blood from Arm, Right Updated:  05/09/18 1528     Protime 13 4 seconds      INR 1 02    APTT [[id number]]  (Normal) Collected:  05/09/18 1449    Lab Status:  Final result Specimen:  Blood from Arm, Right Updated:  05/09/18 1528     PTT 35 seconds     Basic metabolic panel [[id number]]  (Abnormal) Collected:  05/09/18 1449    Lab Status:  Final result Specimen:  Blood from Arm, Right Updated:  05/09/18 1523     Sodium 135 (L) mmol/L      Potassium 4 7 mmol/L      Chloride 98 (L) mmol/L      CO2 28 mmol/L      Anion Gap 9 mmol/L      BUN 36 (H) mg/dL      Creatinine 1 49 (H) mg/dL      Glucose 128 mg/dL      Calcium 8 4 mg/dL      eGFR 67 ml/min/1 73sq m     Narrative:         National Kidney Disease Education Program recommendations are as follows:  GFR calculation is accurate only with a steady state creatinine  Chronic Kidney disease less than 60 ml/min/1 73 sq  meters  Kidney failure less than 15 ml/min/1 73 sq  meters      CBC and differential [[id number]]  (Abnormal) Collected:  05/09/18 1449    Lab Status:  Final result Specimen:  Blood from Arm, Right Updated:  05/09/18 1501     WBC 12 41 (H) Thousand/uL      RBC 3 09 (L) Million/uL      Hemoglobin 9 0 (L) g/dL      Hematocrit 27 4 (L) %      MCV 89 fL      MCH 29 1 pg      MCHC 32 8 g/dL      RDW 14 7 %      MPV 9 6 fL      Platelets 313 (H) Thousands/uL      nRBC 0 /100 WBCs      Neutrophils Relative 86 (H) %      Lymphocytes Relative 9 (L) %      Monocytes Relative 5 %      Eosinophils Relative 0 %      Basophils Relative 0 %      Neutrophils Absolute 10 55 (H) Thousands/µL      Lymphocytes Absolute 1 14 Thousands/µL      Monocytes Absolute 0 66 Thousand/µL      Eosinophils Absolute 0 04 Thousand/µL      Basophils Absolute 0 02 Thousands/µL                  CT femur left wo contrast   Final Result by Matias Potter MD (05/09 3096)      1  Diffuse left anterior thigh subcutaneous edema as well as anterior thigh muscular edema consistent with provided clinical history of posttraumatic injury  No collection identified in this unenhanced study  Correlate clinically  2   Left inguinal lymphadenopathy  Correlate clinically  3   Status post below-knee amputation  Workstation performed: [id number]         XR femur 2 views LEFT   Final Result by Lissett Gonzalez MD (05/09 2902)      No acute osseous abnormality  Workstation performed: [id number]         VAS lower limb venous duplex study, unilateral/limited    (Results Pending)              Procedures  Procedures       Phone Contacts  ED Phone Contact    ED Course  ED Course as of May 09 1814   Wed May 09, 2018   1508 X-ray of left thigh reviewed, no acute fracture or dislocation noted  We will do CT scan of left thigh for evaluation of possible hematoma or evidence of injury  1524 Elevation of Crea noted, worse than recent admission 1 29; we will get CT Thigh w/o contrast, we will give cautious fluid bolus without contrast due to CHF  Creatinine: (!) 1 49   1547 Duplex negative for DVT per Tech  Note: Patient was signed out to Dr Abad Dinh for follow up of CT results and disposition (Chart refreshed before signing to include workup/CT results)                            MDM  Number of Diagnoses or Management Options  Left thigh pain: new and requires workup  Rhabdomyolysis: new and requires workup  Diagnosis management comments: Patient is a 47-year-old male, visiting from [facility], recent admitted and discharged for congestive heart failure, has history of diabetes, hypertension, below-knee amputation of left lower extremity; comes in with complaints of left thigh pain; daughter states that she received a call from the patient that he fell down from the chair today; also states that he does have chronic muscular spasms in the same thigh  On exam patient appears in distress due to pain, left type ears to swollen, with tenderness, no erythema or fluctuance, there is a chronic wound over left anterior knee, no purulent discharge surrounding erythema  Differential diagnosis:  Left thigh pain, swelling, tenderness, fall, rule out femur fracture, DVT, soft tissue hematoma  Will check labs, x-ray, duplex  Amount and/or Complexity of Data Reviewed  Clinical lab tests: ordered and reviewed  Tests in the radiology section of CPT®: ordered and reviewed  Tests in the medicine section of CPT®: reviewed and ordered  Review and summarize past medical records: yes  Discuss the patient with other providers: yes  Independent visualization of images, tracings, or specimens: yes      CritCare Time    Disposition  Final diagnoses:   Left thigh pain   Rhabdomyolysis     Time reflects when diagnosis was documented in both MDM as applicable and the Disposition within this note     Time User Action Codes Description Comment    5/9/2018  3:07 PM Quentin Pablito Add [M30 752] Left thigh pain     5/9/2018  5:42 PM Holly Barnes Add [M62 82] Rhabdomyolysis       ED Disposition     ED Disposition Condition Comment    Admit  Case was discussed with SHELLY and the patient's admission status was agreed to be Admission Status: inpatient status to the service of Dr Sheridan Gibbons  Follow-up Information    None       Patient's Medications   Discharge Prescriptions    No medications on file     No discharge procedures on file      ED Provider  Electronically Signed by           Juan Fuentes MD  05/09/18 1290

## 2018-05-09 NOTE — H&P
History & Physical - [facility] - [facility]      PATIENT INFORMATION      Kai Case 34 y o  male MRN: [id number]  Unit/Bed#: \[facility] [place] [facility]\"" 68 2 [address] 87 220-02 Encounter: [id number]  Admitting Physician: Saintclair Curling, MD  PCP: No primary care provider on file  Date of Admission:  05/09/18      ASSESSMENTS & PLAN       1  Acute traumatic rhabdomyolysis  · s/p fall onto LLE stump today off chair from sitting position - CPK @ 1277 on presentation - started on low-rate IV fluids due to concurrent diastolic CHF - acute renal injury noted (see plan below) - trend daily CPK     2  Acute kidney injury  · serum creatinine @ 1 49 today from 1 18 just four days ago (5/5) - likely due to recent introduction of diuretics (Lasix) for acute CHF (see plan below) coupled with traumatic rhabdomyolysis requiring opposing IV fluids   · monitor renal function and limit/avoid nephrotoxins if possible - will appreciate nephrology input     3   history of Left BKA  · PRN pain control - PT/OT as tolerated - supportive care - wound care input to be appreciated for bruise/skin tear on left knee s/p fall today - patient reassured   · XR of femur s/p fall negative for oosseous abnormalities per reading radiologist - followup CT of left femur revealing diffuse left anterior thigh subcutaneous/muscular edema consistent with post traumatic injury per reading radiologist    4    Acute Diastolic CHF  · Recent diagnosis (just discharged yesterday) - EF of 60% w/ mild MR and mild-moderate TR but no evidence of wall motion abnormalities per reading cardiologist   · c/w Lasix 40 mg daily - may need to temporarily increase frequency as he is now concurrently receiving IV fluids for acute traumatic rhabdomyolysis - monitor I/Os and daily weights - c/w beta-blockade w/ Coreg    · check CXR today   · cardiology input to be appreciated   · serum potassium normal - check serum magnesium level - pro-BNP of 1504 yesterday - will recheck tomorrow since IV fluids initiated as aforementioned     5  Morbid Obesity  · BMI of 41 52   · Lifestyle/diet modifications encouraged     6  Hypothyroidism  · Continue Synthroid - check TSH to ensure optimal dosing     7  Insulin dependent diabetes mellitus  · check HgA1c - c/w home basal insulin regimen w/ additional SSI coverage per accuchecks     8  Essential hypertension  · Low-sodium diet encouraged - c/w Coreg - held Zestril due to GARO     9  Hyperlipidemia   · c/w ASA/Lipitor - LDL of 89 on lipid panel earlier this week     10  Anemia of chronic disease  · H/H stable     11   history of Asthma  · Stable/asymptomatic - PRN Albuterol nebulizer treatments on board       DVT Prophylaxis:  Heparin SC      CHIEF COMPLAINT      Left leg stump pain status post fall       HISTORY OF PRESENT ILLNESS      Paulino Guillen is a 34 y o  male who presents to the ER today after sustaining a fall off a chair with intermittent/consistent pain  He was recently admitted and just discharged yesterday with a new diagnosis of acute diastolic CHF  Routine blood work in the ER today revealed an elevated CPK level of 1277 with CT imaging revealing subcutaneous/muscular edema consistent with a posttraumatic injury  He was started on low rate IV fluids for traumatic rhabdomyolysis concurrently with IV Lasix for CHF  His fluid balance will need to be carefully monitored and the patient is aware of this  Other than pain, he denies any acute complaints at this time  There is an acute skin tear/excoriation surrounding his left knee right above his healed stump site  He denied any loss of consciousness or hitting his head  Upon my encounter on the medical floor, he is resting comfortably in bed with family present  REVIEW OF SYSTEMS      Review of Systems - A thorough 12 point review systems was conducted  Pertinent positives and negatives are mentioned in the history of present illness        PAST MEDICAL & SURGICAL HISTORY      Past Medical History:   Diagnosis Date    [facility] mellitus ([facility] [address] )     Disease of thyroid gland     Hypertension     Neuropathy        Past Surgical History:   Procedure Laterality Date    LEG AMPUTATION      LEG SURGERY           MEDICATIONS & ALLERGIES       Prior to Admission medications    Medication Sig Start Date End Date Taking? Authorizing Provider   aspirin 81 mg chewable tablet Chew 1 tablet (81 mg total) daily 5/9/18  Yes Jayce Cline MD   atorvastatin (LIPITOR) 20 mg tablet Take 1 tablet (20 mg total) by mouth daily 5/8/18  Yes Jayce Clnie MD   carvedilol (COREG) 12 5 mg tablet Take 1 tablet (12 5 mg total) by mouth 2 (two) times a day with meals 5/8/18  Yes Jayce Cline MD   furosemide (LASIX) 40 mg tablet Take 1 tablet (40 mg total) by mouth daily 5/9/18  Yes Jayce Cline MD   insulin glargine (LANTUS) 100 units/mL subcutaneous injection Inject 75 Units under the skin daily at bedtime   Yes Historical Provider, MD   insulin lispro (HumaLOG) 100 units/mL injection Inject 45 Units under the skin daily   Yes Historical Provider, MD   insulin lispro (HumaLOG) 100 units/mL injection Inject under the skin daily at bedtime as needed for high blood sugar   Yes Historical Provider, MD   Levothyroxine Sodium (SYNTHROID PO) Take 20 mcg by mouth daily   Yes Historical Provider, MD   lisinopril (ZESTRIL) 20 mg tablet Take 1/2 tab daily (10mg) 5/8/18  Yes Jayce Cline MD   oxyCODONE-acetaminophen (PERCOCET) 5-325 mg per tablet Take 1 tablet by mouth every 4 (four) hours as needed for moderate pain   Yes Historical Provider, MD   Cholecalciferol (VITAMIN D PO) Take 1 tablet by mouth daily      Historical Provider, MD         Allergies:    Allergies   Allergen Reactions    Shellfish-Derived Products     Tramadol          SOCIAL HISTORY         Marital Status: Single   Occupation:  Disabled  Patient Pre-hospital Living Situation: lives at home   Patient Pre-hospital Level of Mobility:  Uses wheelchair       Substance Use History:   History   Alcohol Use No     History   Smoking Status    Never Smoker   Smokeless Tobacco    Never Used     History   Drug Use No         FAMILY HISTORY      Cannot recall significant family history at this time        PHYSICAL EXAM      Vitals:   Blood Pressure: 139/91 (05/09/18 1830)  Pulse: 102 (05/09/18 1830)  Temperature: 98 9 °F (37 2 °C) (05/09/18 1830)  Temp Source: Temporal (05/09/18 1830)  Respirations: 20 (05/09/18 1830)  Weight - Scale: 103 kg (227 lb) (05/09/18 1246)  SpO2: 100 % (05/09/18 1830)      GENERAL:  Obese - mild distress from pain post fall   HEAD:  Normocephalic - atraumatic  EYES: PERRL - EOMI   MOUTH:  Mucosa moist  NECK:  Supple - full range of motion  CARDIAC:  Regular rate/rhythm - S1/S2 positive  PULMONARY:  Diminished/distant bibasilar breath sounds - nonlabored respirations  ABDOMEN:  Soft - nontender/nondistended - active bowel sounds  MUSCULOSKELETAL:  Motor strength/range of motion deconditioned - s/p left BKA  NEUROLOGIC:  Alert/oriented x 3 - cranial nerves grossly intact   SKIN:  Left knee excoriation w/ skin tear s/p fall - prior left BKA stump healed - bilateral elbow chronic open wounds   PSYCHIATRIC:  Mood/affect stable      ADDITIONAL DATA     Lab Results:       Results from last 7 days  Lab Units 05/09/18  1449   WBC Thousand/uL 12 41*   HEMOGLOBIN g/dL 9 0*   HEMATOCRIT % 27 4*   PLATELETS Thousands/uL 494*   NEUTROS PCT % 86*   LYMPHS PCT % 9*   MONOS PCT % 5   EOS PCT % 0       Results from last 7 days  Lab Units 05/09/18  1449  05/06/18  0700   SODIUM mmol/L 135*  < > 135*   POTASSIUM mmol/L 4 7  < > 3 5   CHLORIDE mmol/L 98*  < > 100   CO2 mmol/L 28  < > 31   BUN mg/dL 36*  < > 26*   CREATININE mg/dL 1 49*  < > 1 29   CALCIUM mg/dL 8 4  < > 8 6   TOTAL PROTEIN g/dL  --   --  7 5   BILIRUBIN TOTAL mg/dL  --   --  0 30   ALK PHOS U/L  --   --  275*   ALT U/L  --   --  33   AST U/L  --   -- 34   GLUCOSE RANDOM mg/dL 128  < > 209*   < > = values in this interval not displayed  Results from last 7 days  Lab Units 05/09/18  1449   INR  1 02       Imaging:     Xr Chest Portable    Result Date: 5/9/2018  Narrative: CHEST INDICATION:   CHF  COMPARISON:  May 5, 2018 EXAM PERFORMED/VIEWS:  XR CHEST PORTABLE FINDINGS: Mild cardiomegaly  Moderate central pulmonary venous distention and increasing interstitial and airspace opacities, especially in the right lower lung zone  No pneumothorax or pleural effusion  Osseous structures appear within normal limits for patient age  Impression: While there is central pulmonary venous distention and prominent bronchovascular markings, there is now worsening airspace opacification in the right lung base  Workstation performed: [id number]     X-ray Chest 2 Views    Result Date: 5/5/2018  Narrative: CHEST INDICATION: 18-year-old male, chest pain, shortness of breath COMPARISON:  None EXAM PERFORMED/VIEWS:  XR CHEST PA & LATERAL FINDINGS: Mild bilateral pulmonary vascular congestion Cardiomediastinal silhouette appears unremarkable  Probable trace pleural effusions  No pneumothorax Osseous structures appear within normal limits for patient age  Impression: Suspected mild vascular congestion and trace pleural effusions Workstation performed: [id number]     Xr Femur 2 Views Left    Result Date: 5/9/2018  Narrative: LEFT FEMUR INDICATION:   Left thigh pain status post fall  COMPARISON:  None VIEWS:  XR FEMUR 2 VW LEFT FINDINGS: Evidence of prior below knee amputation identified  There is no fracture or dislocation  No degenerative changes  No lytic or blastic lesions are seen  There are atherosclerotic calcifications  Soft tissues are otherwise unremarkable  Impression: No acute osseous abnormality    Workstation performed: [id number]     Ct Femur Left Wo Contrast    Result Date: 5/9/2018  Narrative: CT left femur without IV contrast INDICATION: Left Thigh pain and swelling COMPARISON: Plain film from earlier in the day  TECHNIQUE: CT examination of the left was performed  This examination, like all CT scans performed in the [facility], was performed utilizing techniques to minimize radiation dose exposure, including the use of iterative reconstruction and automated exposure control software  Sagittal and coronal two dimensional reconstructed images were also submitted for interpretation  IV Contrast: None Rad dose  990 mGy-cm FINDINGS: OSSEOUS STRUCTURES:  No fracture, dislocation or destructive osseous lesion  Patient is status post below-knee amputation  VISUALIZED MUSCULATURE:  Edematous appearance to the anterior compartment musculature of the thigh  Loss of distinct fascial planes  SOFT TISSUES:  Along the anterior subcutaneous soft tissues of the left thigh there is diffuse subcutaneous edema  OTHER PERTINENT FINDINGS:  Patellofemoral compartment joint effusion evident  Fat-containing left inguinal hernia  Diverticular disease without diverticulitis  Mildly prominent left inguinal lymph nodes  Correlate clinically  Impression: 1  Diffuse left anterior thigh subcutaneous edema as well as anterior thigh muscular edema consistent with provided clinical history of posttraumatic injury  No collection identified in this unenhanced study  Correlate clinically  2   Left inguinal lymphadenopathy  Correlate clinically  3   Status post below-knee amputation  Workstation performed: [id number]     Vas Lower Limb Venous Duplex Study, Unilateral/limited    Result Date: 5/9/2018  Narrative:  [facility] REPORT CLINICAL: Indications: Patient presents with left lower extremity pain x 2 days  Patient has a history of left leg amputation below the knee  Operative History: Left leg BK amputation Clinical: Left Lower Limb There is complaint of pain, edema and tenderness     FINDINGS:  Segment  Right            Left              Impression Impression       CFV      Normal (Patent)  Normal (Patent)     CONCLUSION: RIGHT LOWER LIMB LIMITED: Evaluation shows no evidence of thrombus in the common femoral vein  Doppler evaluation shows a normal response to augmentation maneuvers  LEFT LOWER LIMB: No evidence of acute or chronic deep vein thrombosis No evidence of superficial thrombophlebitis noted  Doppler evaluation shows a normal response to augmentation maneuvers  Popliteal arterial Doppler waveforms are triphasic  Tech Note: There are multiple echogenic structures located in the inguinal region and throughout the leg  The largest structure measures approximately 3 86 cm and is consistent with enlarged lymph node and channels  Note: Patient was unable to tolerate some compressions and augmentations due to severe pain  Technical findings were given to Dr Tyson Ibarra at the time of the exam       Code Status:  Full code      Anticipated Length of Stay:  Patient will be admitted on an Inpatient basis with an anticipated length of stay of greater than 2 midnights  Justification for Hospital Stay:  Traumatic rhabdomyolysis status post fall requiring IV fluids in the setting of recently diagnosed acute congestive heart failure requiring diuretics  Total Time for Visit, including Counseling / Coordination of Care: 71 minutes  Greater than 50% of this total time spent on direct patient counseling and coordination of care     ** Please Note: This note is constructed using a voice recognition dictation system   **

## 2018-05-09 NOTE — ED NOTES
Post morphine administration, pt's oxygen dropped into the 80s, placed on 2L NC and returned to 91-95%       Geni Steele RN  05/09/18 7178

## 2018-05-10 LAB
ANION GAP SERPL CALCULATED.3IONS-SCNC: 7 MMOL/L (ref 4–13)
BACTERIA UR QL AUTO: ABNORMAL /HPF
BILIRUB UR QL STRIP: NEGATIVE
BUN SERPL-MCNC: 36 MG/DL (ref 5–25)
CALCIUM SERPL-MCNC: 8.3 MG/DL (ref 8.3–10.1)
CHLORIDE SERPL-SCNC: 97 MMOL/L (ref 100–108)
CK MB SERPL-MCNC: 7.3 NG/ML (ref 0–5)
CK MB SERPL-MCNC: <1 % (ref 0–2.5)
CK SERPL-CCNC: 979 U/L (ref 39–308)
CLARITY UR: ABNORMAL
CO2 SERPL-SCNC: 28 MMOL/L (ref 21–32)
COARSE GRAN CASTS URNS QL MICRO: ABNORMAL /LPF
COLOR UR: YELLOW
CREAT SERPL-MCNC: 1.68 MG/DL (ref 0.6–1.3)
CREAT UR-MCNC: 80.5 MG/DL
ERYTHROCYTE [DISTWIDTH] IN BLOOD BY AUTOMATED COUNT: 15 % (ref 11.6–15.1)
EST. AVERAGE GLUCOSE BLD GHB EST-MCNC: 378 MG/DL
FINE GRAN CASTS URNS QL MICRO: ABNORMAL /LPF
GFR SERPL CREATININE-BSD FRML MDRD: 58 ML/MIN/1.73SQ M
GLUCOSE SERPL-MCNC: 192 MG/DL (ref 65–140)
GLUCOSE SERPL-MCNC: 196 MG/DL (ref 65–140)
GLUCOSE SERPL-MCNC: 214 MG/DL (ref 65–140)
GLUCOSE SERPL-MCNC: 230 MG/DL (ref 65–140)
GLUCOSE SERPL-MCNC: 265 MG/DL (ref 65–140)
GLUCOSE UR STRIP-MCNC: ABNORMAL MG/DL
HBA1C MFR BLD: 14.8 % (ref 4.2–6.3)
HCT VFR BLD AUTO: 24.4 % (ref 36.5–49.3)
HGB BLD-MCNC: 7.8 G/DL (ref 12–17)
HGB UR QL STRIP.AUTO: ABNORMAL
KETONES UR STRIP-MCNC: NEGATIVE MG/DL
LEUKOCYTE ESTERASE UR QL STRIP: NEGATIVE
MAGNESIUM SERPL-MCNC: 2 MG/DL (ref 1.6–2.6)
MCH RBC QN AUTO: 28.7 PG (ref 26.8–34.3)
MCHC RBC AUTO-ENTMCNC: 32 G/DL (ref 31.4–37.4)
MCV RBC AUTO: 90 FL (ref 82–98)
NITRITE UR QL STRIP: NEGATIVE
NON-SQ EPI CELLS URNS QL MICRO: ABNORMAL /HPF
NT-PROBNP SERPL-MCNC: 2240 PG/ML
PH UR STRIP.AUTO: 6 [PH] (ref 4.5–8)
PHOSPHATE SERPL-MCNC: 5 MG/DL (ref 2.7–4.5)
PLATELET # BLD AUTO: 436 THOUSANDS/UL (ref 149–390)
PLATELET # BLD AUTO: 469 THOUSANDS/UL (ref 149–390)
PMV BLD AUTO: 9.7 FL (ref 8.9–12.7)
PMV BLD AUTO: 9.9 FL (ref 8.9–12.7)
POTASSIUM SERPL-SCNC: 4.2 MMOL/L (ref 3.5–5.3)
PROT UR STRIP-MCNC: >=300 MG/DL
PROT UR-MCNC: 743 MG/DL
PROT/CREAT UR: 9.23 MG/G{CREAT} (ref 0–0.1)
RBC # BLD AUTO: 2.72 MILLION/UL (ref 3.88–5.62)
RBC #/AREA URNS AUTO: ABNORMAL /HPF
SODIUM SERPL-SCNC: 132 MMOL/L (ref 136–145)
SP GR UR STRIP.AUTO: 1.02 (ref 1–1.03)
UROBILINOGEN UR QL STRIP.AUTO: 1 E.U./DL
WBC # BLD AUTO: 12.95 THOUSAND/UL (ref 4.31–10.16)
WBC #/AREA URNS AUTO: ABNORMAL /HPF

## 2018-05-10 PROCEDURE — 83036 HEMOGLOBIN GLYCOSYLATED A1C: CPT | Performed by: INTERNAL MEDICINE

## 2018-05-10 PROCEDURE — 83880 ASSAY OF NATRIURETIC PEPTIDE: CPT | Performed by: INTERNAL MEDICINE

## 2018-05-10 PROCEDURE — 80048 BASIC METABOLIC PNL TOTAL CA: CPT | Performed by: INTERNAL MEDICINE

## 2018-05-10 PROCEDURE — 81001 URINALYSIS AUTO W/SCOPE: CPT | Performed by: INTERNAL MEDICINE

## 2018-05-10 PROCEDURE — 82550 ASSAY OF CK (CPK): CPT | Performed by: INTERNAL MEDICINE

## 2018-05-10 PROCEDURE — 82948 REAGENT STRIP/BLOOD GLUCOSE: CPT

## 2018-05-10 PROCEDURE — 85027 COMPLETE CBC AUTOMATED: CPT | Performed by: INTERNAL MEDICINE

## 2018-05-10 PROCEDURE — 84100 ASSAY OF PHOSPHORUS: CPT | Performed by: INTERNAL MEDICINE

## 2018-05-10 PROCEDURE — 84156 ASSAY OF PROTEIN URINE: CPT | Performed by: INTERNAL MEDICINE

## 2018-05-10 PROCEDURE — 99253 IP/OBS CNSLTJ NEW/EST LOW 45: CPT | Performed by: INTERNAL MEDICINE

## 2018-05-10 PROCEDURE — 82553 CREATINE MB FRACTION: CPT | Performed by: INTERNAL MEDICINE

## 2018-05-10 PROCEDURE — 83735 ASSAY OF MAGNESIUM: CPT | Performed by: INTERNAL MEDICINE

## 2018-05-10 PROCEDURE — 99254 IP/OBS CNSLTJ NEW/EST MOD 60: CPT | Performed by: INTERNAL MEDICINE

## 2018-05-10 PROCEDURE — 93971 EXTREMITY STUDY: CPT | Performed by: SURGERY

## 2018-05-10 PROCEDURE — 99233 SBSQ HOSP IP/OBS HIGH 50: CPT | Performed by: INTERNAL MEDICINE

## 2018-05-10 PROCEDURE — 82570 ASSAY OF URINE CREATININE: CPT | Performed by: INTERNAL MEDICINE

## 2018-05-10 PROCEDURE — 85049 AUTOMATED PLATELET COUNT: CPT | Performed by: INTERNAL MEDICINE

## 2018-05-10 RX ORDER — MORPHINE SULFATE 2 MG/ML
2 INJECTION, SOLUTION INTRAMUSCULAR; INTRAVENOUS EVERY 4 HOURS PRN
Status: DISCONTINUED | OUTPATIENT
Start: 2018-05-10 | End: 2018-05-11

## 2018-05-10 RX ORDER — FUROSEMIDE 10 MG/ML
40 INJECTION INTRAMUSCULAR; INTRAVENOUS
Status: DISCONTINUED | OUTPATIENT
Start: 2018-05-10 | End: 2018-05-11

## 2018-05-10 RX ADMIN — INSULIN GLARGINE 75 UNITS: 100 INJECTION, SOLUTION SUBCUTANEOUS at 21:48

## 2018-05-10 RX ADMIN — HYDROMORPHONE HYDROCHLORIDE 0.5 MG: 1 INJECTION, SOLUTION INTRAMUSCULAR; INTRAVENOUS; SUBCUTANEOUS at 22:29

## 2018-05-10 RX ADMIN — BACITRACIN ZINC 1 LARGE APPLICATION: 500 OINTMENT TOPICAL at 17:45

## 2018-05-10 RX ADMIN — OXYCODONE HYDROCHLORIDE 5 MG: 5 TABLET ORAL at 01:49

## 2018-05-10 RX ADMIN — CARVEDILOL 12.5 MG: 6.25 TABLET, FILM COATED ORAL at 09:26

## 2018-05-10 RX ADMIN — CHOLECALCIFEROL TAB 10 MCG (400 UNIT) 400 UNITS: 10 TAB at 09:26

## 2018-05-10 RX ADMIN — INSULIN LISPRO 3 UNITS: 100 INJECTION, SOLUTION INTRAVENOUS; SUBCUTANEOUS at 21:48

## 2018-05-10 RX ADMIN — MORPHINE SULFATE 2 MG: 2 INJECTION, SOLUTION INTRAMUSCULAR; INTRAVENOUS at 03:17

## 2018-05-10 RX ADMIN — ONDANSETRON 4 MG: 2 INJECTION INTRAMUSCULAR; INTRAVENOUS at 17:44

## 2018-05-10 RX ADMIN — OXYCODONE HYDROCHLORIDE 5 MG: 5 TABLET ORAL at 06:17

## 2018-05-10 RX ADMIN — HEPARIN SODIUM 5000 UNITS: 5000 INJECTION, SOLUTION INTRAVENOUS; SUBCUTANEOUS at 06:16

## 2018-05-10 RX ADMIN — INSULIN LISPRO 2 UNITS: 100 INJECTION, SOLUTION INTRAVENOUS; SUBCUTANEOUS at 17:48

## 2018-05-10 RX ADMIN — CARVEDILOL 12.5 MG: 6.25 TABLET, FILM COATED ORAL at 17:43

## 2018-05-10 RX ADMIN — FUROSEMIDE 40 MG: 10 INJECTION, SOLUTION INTRAMUSCULAR; INTRAVENOUS at 17:00

## 2018-05-10 RX ADMIN — BACITRACIN ZINC 1 LARGE APPLICATION: 500 OINTMENT TOPICAL at 09:26

## 2018-05-10 RX ADMIN — ASPIRIN 81 MG 81 MG: 81 TABLET ORAL at 09:26

## 2018-05-10 RX ADMIN — MORPHINE SULFATE 2 MG: 2 INJECTION, SOLUTION INTRAMUSCULAR; INTRAVENOUS at 17:44

## 2018-05-10 RX ADMIN — HEPARIN SODIUM 5000 UNITS: 5000 INJECTION, SOLUTION INTRAVENOUS; SUBCUTANEOUS at 21:48

## 2018-05-10 RX ADMIN — FUROSEMIDE 40 MG: 10 INJECTION, SOLUTION INTRAMUSCULAR; INTRAVENOUS at 09:27

## 2018-05-10 RX ADMIN — INSULIN LISPRO 2 UNITS: 100 INJECTION, SOLUTION INTRAVENOUS; SUBCUTANEOUS at 09:26

## 2018-05-10 RX ADMIN — HEPARIN SODIUM 5000 UNITS: 5000 INJECTION, SOLUTION INTRAVENOUS; SUBCUTANEOUS at 13:21

## 2018-05-10 RX ADMIN — ONDANSETRON 4 MG: 2 INJECTION INTRAMUSCULAR; INTRAVENOUS at 09:27

## 2018-05-10 RX ADMIN — ONDANSETRON 4 MG: 2 INJECTION INTRAMUSCULAR; INTRAVENOUS at 22:01

## 2018-05-10 RX ADMIN — OXYCODONE HYDROCHLORIDE 10 MG: 10 TABLET ORAL at 12:29

## 2018-05-10 RX ADMIN — MORPHINE SULFATE 2 MG: 2 INJECTION, SOLUTION INTRAMUSCULAR; INTRAVENOUS at 13:21

## 2018-05-10 RX ADMIN — LEVOTHYROXINE SODIUM 25 MCG: 25 TABLET ORAL at 06:16

## 2018-05-10 RX ADMIN — INSULIN LISPRO 3 UNITS: 100 INJECTION, SOLUTION INTRAVENOUS; SUBCUTANEOUS at 12:24

## 2018-05-10 RX ADMIN — ATORVASTATIN CALCIUM 20 MG: 20 TABLET, FILM COATED ORAL at 17:44

## 2018-05-10 RX ADMIN — HYDROMORPHONE HYDROCHLORIDE 0.5 MG: 1 INJECTION, SOLUTION INTRAMUSCULAR; INTRAVENOUS; SUBCUTANEOUS at 07:35

## 2018-05-10 NOTE — CASE MANAGEMENT
Initial Clinical Review    Admission: Date/Time/Statement: 5/9/18 @ 1741     Orders Placed This Encounter   Procedures    Inpatient Admission (expected length of stay for this patient is greater than two midnights)     Standing Status:   Standing     Number of Occurrences:   1     Order Specific Question:   Admitting Physician     Answer:   Yissel Swift     Order Specific Question:   Level of Care     Answer:   Med Surg [16]     Order Specific Question:   Estimated length of stay     Answer:   More than 2 Midnights     Order Specific Question:   Certification     Answer:   I certify that inpatient services are medically necessary for this patient for a duration of greater than two midnights  See H&P and MD Progress Notes for additional information about the patient's course of treatment  ED: Date/Time/Mode of Arrival:   ED Arrival Information     Expected Arrival Acuity Means of Arrival Escorted By Service Admission Type    - 5/9/2018 12:43 [facility]gent [facility] General Medicine Urgent    Arrival Complaint    Hip pain,fall          Chief Complaint:   Chief Complaint   Patient presents with    Leg Pain     pt fell out of chair and onto floor  pt left BKA and fell onto stump, now c/o pain to left thigh  abrasions noted to bilateral knees which pt had on previous visit per other ED staff  pt just d/c yesterday from [address]  denies LOC or blood thinners  History of Illness:  34 y o  male who presents to the ER today after sustaining a fall off a chair with intermittent/consistent pain  He was recently admitted and just discharged yesterday with a new diagnosis of acute diastolic CHF  Routine blood work in the ER today revealed an elevated CPK level of 1277 with CT imaging revealing subcutaneous/muscular edema consistent with a posttraumatic injury  He was started on low rate IV fluids for traumatic rhabdomyolysis concurrently with IV Lasix for CHF    His fluid balance will need to be carefully monitored and the patient is aware of this  Other than pain, he denies any acute complaints at this time  There is an acute skin tear/excoriation surrounding his left knee right above his healed stump site  He denied any loss of consciousness or hitting his head  Upon my encounter on the medical floor, he is resting comfortably in bed with family present  ED Vital Signs:   ED Triage Vitals   Temperature Pulse Respirations Blood Pressure SpO2   05/09/18 1246 05/09/18 1246 05/09/18 1246 05/09/18 1246 05/09/18 1246   98 2 °F (36 8 °C) 97 20 (!) 153/102 95 %      Temp Source Heart Rate Source Patient Position - Orthostatic VS BP Location FiO2 (%)   05/09/18 1246 05/09/18 1659 05/09/18 1659 05/09/18 1659 --   Oral Monitor Lying Left arm       Pain Score       05/09/18 1246       Worst Possible Pain        Wt Readings from Last 1 Encounters:   05/09/18 103 kg (227 lb)       Abnormal Labs/Diagnostic Test Results:  CK 1,277,   CK MB 11 1,   Na 135,   Cl 98,   BUN 36,   Cr 1 49,   WBC's 112 41,   H&H 9 / 27 4,  Plats 494,    ANC 10 55,   HgA1C 14 8    Xray L Femur: No acute osseous abnormality       CT Left Femur: 1   Diffuse left anterior thigh subcutaneous edema as well as anterior thigh muscular edema consistent with provided clinical history of posttraumatic injury   No collection identified in this unenhanced study   Correlate clinically  2   Left inguinal lymphadenopathy   Correlate clinically  3   Status post below-knee amputation    LLE Duplex:  No evidence of acute or chronic deep vein thrombosis  No evidence of superficial thrombophlebitis noted  Doppler evaluation shows a normal response to augmentation maneuvers      ED Treatment:   O2  @ 2-3 liters  Medication Administration from 05/09/2018 1243 to 05/09/2018 1827       Date/Time Order Dose Route Action Action by Comments     05/09/2018 1459 morphine (PF) 4 mg/mL injection 4 mg 4 mg Intravenous Given Garnetta Aase, RN      05/09/2018 4795 ondansetron (ZOFRAN) injection 4 mg 4 mg Intravenous Given Luke Bronson RN      05/09/2018 1708 sodium chloride 0 9 % bolus 250 mL 0 mL Intravenous Stopped Max Cox RN      05/09/2018 1546 sodium chloride 0 9 % bolus 250 mL 250 mL Intravenous [address] Max Cox RN      05/09/2018 1724 HYDROmorphone (DILAUDID) injection 1 mg 1 mg Intravenous Given Max Cox RN           Past Medical/Surgical History: Active Ambulatory Problems     Diagnosis Date Noted    Hypertension     Hypothyroidism      Acute diastolic congestive heart failure  05/05/2018    GARO [facility]acute kidney injury) ([facility] [address] ) 05/05/2018    history of Asthma 05/05/2018    Hx of BKA, left ([facility] ) 05/05/2018     Resolved Ambulatory Problems     Diagnosis Date Noted    No Resolved Ambulatory Problems     Past Medical History:   Diagnosis Date    Diabetes mellitus ([facility] )     Disease of thyroid gland     Hypertension     Neuropathy        Admitting Diagnosis: Rhabdomyolysis [M62 82]  Hip pain [M25 559]  Left thigh pain [M79 652]    Age/Sex: 34 y o  male    Assessment/Plan:   1  Acute traumatic rhabdomyolysis  · s/p fall onto LLE stump today off chair from sitting position - CPK @ 1277 on presentation - started on low-rate IV fluids due to concurrent diastolic CHF - acute renal injury noted (see plan below) - trend daily CPK      2    Acute kidney injury  · serum creatinine @ 1 49 today from 1 18 just four days ago (5/5) - likely due to recent introduction of diuretics (Lasix) for acute CHF (see plan below) coupled with traumatic rhabdomyolysis requiring opposing IV fluids   · monitor renal function and limit/avoid nephrotoxins if possible - will appreciate nephrology input      3   history of Left BKA  · PRN pain control - PT/OT as tolerated - supportive care - wound care input to be appreciated for bruise/skin tear on left knee s/p fall today - patient reassured   · XR of femur s/p fall negative for oosseous abnormalities per reading radiologist - followup CT of left femur revealing diffuse left anterior thigh subcutaneous/muscular edema consistent with post traumatic injury per reading radiologist     4  Acute Diastolic CHF  · Recent diagnosis (just discharged yesterday) - EF of 60% w/ mild MR and mild-moderate TR but no evidence of wall motion abnormalities per reading cardiologist   · c/w Lasix 40 mg daily - may need to temporarily increase frequency as he is now concurrently receiving IV fluids for acute traumatic rhabdomyolysis - monitor I/Os and daily weights - c/w beta-blockade w/ Coreg    · check CXR today   · cardiology input to be appreciated   · serum potassium normal - check serum magnesium level - pro-BNP of 1504 yesterday - will recheck tomorrow since IV fluids initiated as aforementioned      5  Morbid Obesity  · BMI of 41 52   · Lifestyle/diet modifications encouraged      6  Hypothyroidism  · Continue Synthroid - check TSH to ensure optimal dosing      7  Insulin dependent diabetes mellitus  · check HgA1c - c/w home basal insulin regimen w/ additional SSI coverage per accuchecks      8   Essential hypertension  · Low-sodium diet encouraged - c/w Coreg - held Zestril due to GARO      9  Hyperlipidemia   · c/w ASA/Lipitor - LDL of 89 on lipid panel earlier this week      10    Anemia of chronic disease  · H/H stable      11   history of Asthma  · Stable/asymptomatic - PRN Albuterol nebulizer treatments on board      DVT Prophylaxis:  Heparin SC     Admission Orders:  [address] [facility]  Consult Cardio  SCD's  PT / [address]    Scheduled Meds:   Current Facility-Administered Medications:                aspirin 81 mg Oral Daily Rafaela Peres MD   atorvastatin 20 mg Oral After Chuy Will MD   bacitracin 1 large application Topical BID Rafaela Peres MD   carvedilol 12 5 mg Oral BID With Meals Rafaela Peres MD   cholecalciferol 400 Units Oral Daily Rafaela Peres MD   furosemide 40 mg Intravenous BID (diuretic) Duane Pedroite, DO   heparin (porcine) 5,000 Units Subcutaneous [facility] Nat James MD   insulin glargine 75 Units Subcutaneous HS Nat James MD   insulin lispro 1-6 Units Subcutaneous 4x Daily (AC & HS) Nat James MD   levothyroxine 25 mcg Oral Daily Before Breakfast Nat James MD                                 Continuous Infusions:  IVF @ 75 / hr  PRN Meds:   acetaminophen    albuterol    morphine injection    ondansetron    oxyCODONE    oxyCODONE      Thank you,  [address] [place] [address] in the [facility] by Dagoberto Thomas for 2017  Network Utilization Review Department  Phone: [phone]; Fax [phone]  ATTENTION: The Network Utilization Review Department is now centralized for our 7 Facilities  Make a note that we have a new phone and fax numbers for our Department  Please call with any questions or concerns to [phone] and carefully follow the prompts so that you are directed to the right person  All voicemails are confidential  Fax any determinations, approvals, denials, and requests for initial or continue stay review clinical to [phone]  Due to HIGH CALL volume, it would be easier if you could please send faxed requests to expedite your requests and in part, help us provide discharge notifications faster

## 2018-05-10 NOTE — CONSULTS
Consultation - Nephrology   [facility] 34 y o  male MRN: [id number]  Unit/Bed#: [address] 220-01 Encounter: [id number]      Assessment/Plan:  1  Acute kidney injury, likely multifactorial, could be component of mild rhabdomyolysis, also could be a component of cardiorenal syndrome given increasing weight and hyponatremia  2  Chronic kidney disease, baseline creatinine 1 2-1 3  3  Hyponatremia, multifactorial given hyperglycemia and component of volume overload  4  Diastolic heart failure, ejection fraction 60%  5  Acute on chronic anemia, continue to monitor, may require blood transfusion  6  Hypertension, hold ACE-inhibitor      Plan:  · Discontinue IV fluids, mild component of rhabdo  · Continue with IV Lasix, increase to b i d   · Monitor renal function closely  · Repeat urinalysis, check protein creatinine ratio  · Continue to hold ACE-inhibitor  · Check bladder scan    History of Present Illness   Physician Requesting Consult: Keya Powers MD  Reason for Consult / Principal Problem:  Acute kidney injury  HPI: [facility] is a 34y o  year old male who presents with fall  Patient is a 47-year-old male who was recently discharged yesterday with a diagnosis of acute diastolic heart failure  Unfortunately presented to the ER yesterday after sustaining a fall from a chair  Concern for injury left BKA, radiological studies showed evidence of fracture although noted edema  Currently patient denies any significant abdominal bloating, weight gain or shortness of Breath  No active chest pain  Most of his complaints are secondary to pain involving the left thigh  Had some episodes of nausea vomiting today secondary to pain medications  No problems with urinating      History obtained from chart review and the patient    Review of Systems    Review of Systems: pertinent findings as noted above otherwise negative    Historical Information   Patient Active Problem List   Diagnosis    Hypertension    Hypothyroidism     Acute diastolic congestive heart failure     [facility]) ([facility] )    history of Asthma    Hx of BKA, left (HCC)    Traumatic rhabdomyolysis s/p fall     Morbid obesity     Insulin dependent diabetes mellitus     Hyperlipidemia    Anemia of chronic disease     Past Medical History:   Diagnosis Date    Diabetes mellitus ([facility] )     Disease of thyroid gland     Hypertension     Neuropathy      Past Surgical History:   Procedure Laterality Date    LEG AMPUTATION      LEG SURGERY       Social History   History   Alcohol Use No     History   Drug Use No     History   Smoking Status    Never Smoker   Smokeless Tobacco    Never Used     History reviewed  No pertinent family history      Meds/Allergies   current meds:   Current Facility-Administered Medications   Medication Dose Route Frequency    acetaminophen (TYLENOL) tablet 650 mg  650 mg Oral Q6H PRN    albuterol inhalation solution 2 5 mg  2 5 mg Nebulization Q4H PRN    aspirin chewable tablet 81 mg  81 mg Oral Daily    atorvastatin (LIPITOR) tablet 20 mg  20 mg Oral After Dinner    bacitracin topical ointment 1 large application  1 large application Topical BID    carvedilol (COREG) tablet 12 5 mg  12 5 mg Oral BID With Meals    cholecalciferol (VITAMIN D3) tablet 400 Units  400 Units Oral Daily    furosemide (LASIX) injection 40 mg  40 mg Intravenous Daily    heparin (porcine) subcutaneous injection 5,000 Units  5,000 Units Subcutaneous Q8H [facility]    insulin glargine (LANTUS) subcutaneous injection 75 Units 0 75 mL  75 Units Subcutaneous HS    insulin lispro (HumaLOG) 100 units/mL subcutaneous injection 1-6 Units  1-6 Units Subcutaneous 4x Daily (AC & HS)    levothyroxine tablet 25 mcg  25 mcg Oral Daily Before Breakfast    morphine injection 2 mg  2 mg Intravenous Q4H PRN    ondansetron (ZOFRAN) injection 4 mg  4 mg Intravenous Q4H PRN    oxyCODONE (ROXICODONE) immediate release tablet 10 mg  10 mg Oral Q4H PRN  oxyCODONE (ROXICODONE) IR tablet 5 mg  5 mg Oral Q4H PRN       Allergies   Allergen Reactions    Shellfish-Derived Products     Tramadol          Objective   /84 (BP Location: Left arm)   Pulse 85   Temp (!) 97 2 °F (36 2 °C) (Temporal)   Resp 20   Wt 103 kg (227 lb)   SpO2 95%   BMI 41 52 kg/m²     Intake/Output Summary (Last 24 hours) at 05/10/18 1000  Last data filed at 05/09/18 1945   Gross per 24 hour   Intake              500 ml   Output              475 ml   Net               25 ml       Current Weight: Weight - Scale: 103 kg (227 lb)    Physical Exam   Constitutional: He is oriented to person, place, and time  No distress  HENT:   Head: Normocephalic  Eyes: No scleral icterus  Neck: Neck supple  Cardiovascular: Normal rate and regular rhythm  Pulmonary/Chest: Effort normal and breath sounds normal    Abdominal: Soft  He exhibits distension  Musculoskeletal: He exhibits edema (Plus two edema right lower extremity)  Neurological: He is alert and oriented to person, place, and time  Skin: Skin is warm and dry  Psychiatric: He has a normal mood and affect           Lab Results:      Results from last 7 days  Lab Units 05/10/18  0603   WBC Thousand/uL 12 95*   HEMOGLOBIN g/dL 7 8*   HEMATOCRIT % 24 4*   PLATELETS Thousands/uL 469*  436*       Results from last 7 days  Lab Units 05/10/18  0603   SODIUM mmol/L 132*   POTASSIUM mmol/L 4 2   CHLORIDE mmol/L 97*   CO2 mmol/L 28   BUN mg/dL 36*   CREATININE mg/dL 1 68*   GLUCOSE RANDOM mg/dL 192*   CALCIUM mg/dL 8 3

## 2018-05-10 NOTE — CONSULTS
Consult - Cardiology   [facility] 34 y o  male MRN: [id number]  Unit/Bed#: [address] [place] 220-01 Encounter: [id number]          Reason For Consult:  Chronic systolic heart failure    History Of Present Illness:  40-year-old male with a history of diabetes mellitus and below the knee amputation on the left  He was admitted to the hospital on 05/05/2018 and discharged yesterday for the new discovery of chronic diastolic heart failure  An echo showed grade 2 diastolic dysfunction with a preserved ejection fraction  He was discharged home on furosemide 40 mg daily  Yesterday afternoon he fell out of a chair and fell right on to his left BKA stump  He is still having a lot of pain there  He denies much in the way of chest pain or shortness of breath  He does have modest edema the right lower extremity  He denies palpitations or lightheadedness              Past Medical History:   Diabetes mellitus  Peripheral vascular disease status post BKA on the left  Chronic diastolic heart failure  Chronic kidney disease  Morbid obesity  Thyroid disorder  History of asthma  Hypertension  Hyperlipidemia  Anemia        Allergy:  Allergies   Allergen Reactions    Shellfish-Derived Products     Tramadol        Medications:  Furosemide 40 mg daily  Aspirin 81 mg daily  Atorvastatin 20 mg daily  Carvedilol 12 5 mg b i d  Vitamin D3  Insulin  Levothyroxine  Lisinopril 10 mg daily    Family History:  No premature coronary disease    Social History:  No alcohol use  No tobacco use        ROS:  No TIAs or claudication    Exam:  131/84  Pulse 85  General:  Obese pleasant middle-age male no acute distress  Head: Normocephalic, atraumatic  Eyes:   No Icterus    Pale Conjunctiva  Oropharynx: normal-appearing mucosa and no pharyngitis, no exudate  Neck: supple, symmetrical, trachea midline, thyroid: not enlarged, symmetric, no tenderness/mass/nodules, no carotid bruit and no JVD   Heart: RRR, No: murmer, rub or gallop,   Lungs: normal air entry, lungs clear to auscultation and no rales, rhonchi or wheezing  Abdomen: obese, normal findings: no masses palpable, no organomegaly and soft, non-tender  Lower Limbs:  1+ edema of the right lower extremity with absent pulses there  BKA on the left    ProBNP 2240  Hemoglobin 7 8, white blood count 12 95, platelets [id number]  CK on admission 1277  BUN 36, creatinine 1 49, potassium 4 7    ASSESSMENT AND PLAN:   1  Chronic diastolic heart failure  Patient appears compensated  IV Lasix ordered at double his usual dosage by renal  Will let them guide mgt of volume  2  Hypertension  Well controlled on carvedilol  Continue same dosage  Apparently lisinopril being held  3  Hyperlipidemia-on statin therapy    No further suggestions at this time  Patient appears to be compensated from our standpoint  Renal will manage fluids  Will see here mykel Albert MD

## 2018-05-10 NOTE — PLAN OF CARE
DISCHARGE PLANNING     Discharge to home or other facility with appropriate resources Progressing        INFECTION - ADULT     Absence or prevention of progression during hospitalization Progressing     Absence of fever/infection during neutropenic period Progressing        MUSCULOSKELETAL - ADULT     Maintain or return mobility to safest level of function Progressing     Maintain proper alignment of affected body part Progressing        PAIN - ADULT     Verbalizes/displays adequate comfort level or baseline comfort level Progressing        Potential for Falls     Patient will remain free of falls Progressing        SAFETY ADULT     Maintain or return to baseline ADL function Progressing     Maintain or return mobility status to optimal level Progressing        SKIN/TISSUE INTEGRITY - ADULT     Skin integrity remains intact Progressing     Incision(s), wounds(s) or drain site(s) healing without S/S of infection Progressing     Oral mucous membranes remain intact Progressing

## 2018-05-10 NOTE — PROGRESS NOTES
[facility] Hospitalist Service - Internal Medicine Progress Note      PATIENT INFORMATION      Patient: Chrissie Hunt 34 y o  male   MRN: [id number]  PCP: No primary care provider on file  Unit/Bed#: [address] 220-01 Encounter: [id number]  Date Of Visit: 05/10/18       ASSESSMENTS & PLAN        1  Acute traumatic rhabdomyolysis  · s/p fall onto LLE stump yesterday off chair from sitting position - CPK @ 1277 on presentation improved to 979 today status post low-rate IV fluids due to concurrent diastolic CHF - acute renal injury noted (see plan below) - trend daily CPK - IV fluids to be stopped today due to worsening congestion on CXR     2   Acute kidney injury  · serum creatinine @ 1 68 today from 1 49 yesterday (was 1 18 just five days ago - likely due to recent introduction of diuretics (Lasix) for acute CHF (see plan below) coupled with traumatic rhabdomyolysis requiring opposing IV fluids yesterday  · monitor renal function and limit/avoid nephrotoxins if possible - appreciate nephrology input - as Lasix increased to BID today, monitor renal function carefully     3   history of Left BKA  · PRN pain control - PT/OT as tolerated - supportive care - wound care input to be appreciated for bruise/skin tear on left knee s/p fall today - patient reassured    · XR of femur s/p fall negative for oosseous abnormalities per reading radiologist - followup CT of left femur revealing diffuse left anterior thigh subcutaneous/muscular edema consistent with post traumatic injury per reading radiologist     4    Acute Diastolic CHF  · Recent diagnosis (just discharged two days ago prior to this readmission) - EF of 60% w/ mild MR and mild-moderate TR but no evidence of wall motion abnormalities per reading cardiologist   · increased Lasix to 40 mg BID today per nephrology due to CXR findings as below - monitor I/Os and daily weights - continue beta-blockade w/ Coreg    · CXR today revealed central pulmonary congestion with worsening opacifications  · cardiology input appreciated  · serum potassium and magnesium within normal limits - pro-BNP of 1504 two days ago at 2240 today - IV fluids for rhabdomyolysis on admission stopped      5  Morbid Obesity  · BMI of 41 52   · Lifestyle/diet modifications encouraged      6  Hypothyroidism  · Continue Synthroid - TSH within normal limits     7  Insulin dependent diabetes mellitus  · HgA1c uncontrolled at 14 8 - c/w basal insulin regimen w/ additional SSI coverage per accuchecks - strong suspicion for noncompliance to insulin regimen at home as inpatient Accu-Chek readings are in the 140-230 range     8   Essential hypertension  · Low-sodium diet encouraged - c/w Coreg - held Zestril due to GARO      9  Hyperlipidemia   · c/w ASA/Lipitor - LDL of 89 on lipid panel earlier this week      10  Anemia of chronic disease  · H/H stable      11   history of Asthma  · Stable/asymptomatic - PRN Albuterol nebulizer treatments on board       DVT Prophylaxis:  Heparin SC      SUBJECTIVE     Seen and examined this afternoon  Still complains of intermittent pain on his left stump status post fall yesterday  Denies any significant shortness of breath at this time  He feels generally weak/fatigued  OBJECTIVE     Vitals:   Temp (24hrs), Av 1 °F (36 7 °C), Min:96 7 °F (35 9 °C), Max:99 8 °F (37 7 °C)    HR:  [] 74  Resp:  [20-22] 20  BP: (123-149)/(79-91) 123/85  SpO2:  [84 %-100 %] 96 %  Body mass index is 41 52 kg/m²  Input and Output Summary (last 24 hours):        Intake/Output Summary (Last 24 hours) at 05/10/18 1745  Last data filed at 05/10/18 1101   Gross per 24 hour   Intake                0 ml   Output              925 ml   Net             -925 ml       Physical Exam:     GENERAL:  Obese - waxing/waning distress from pain  HEAD:  Normocephalic - atraumatic  EYES: PERRL - EOMI   MOUTH:  Mucosa moist  NECK:  Supple - full range of motion  CARDIAC:  Regular rate/rhythm - S1/S2 positive  PULMONARY:  Clear but diminished breath sounds bilaterally - nonlabored respirations  ABDOMEN:  Soft - nontender/nondistended - active bowel sounds  MUSCULOSKELETAL:  Motor strength/range of motion deconditioned - s/p left BKA  NEUROLOGIC:  Alert/oriented x 3  SKIN:  Left knee excoriation w/ skin tear s/p fall - prior left BKA stump healed - bilateral elbow chronic open wounds   PSYCHIATRIC:  Mood/affect age-appropriate      ADDITIONAL DATA     Labs & Recent Cultures:       Results from last 7 days  Lab Units 05/10/18  0603 05/09/18  1449   WBC Thousand/uL 12 95* 12 41*   HEMOGLOBIN g/dL 7 8* 9 0*   HEMATOCRIT % 24 4* 27 4*   PLATELETS Thousands/uL 469*  436* 494*   NEUTROS PCT %  --  86*   LYMPHS PCT %  --  9*   MONOS PCT %  --  5   EOS PCT %  --  0       Results from last 7 days  Lab Units 05/10/18  0603  05/06/18  0700   SODIUM mmol/L 132*  < > 135*   POTASSIUM mmol/L 4 2  < > 3 5   CHLORIDE mmol/L 97*  < > 100   CO2 mmol/L 28  < > 31   BUN mg/dL 36*  < > 26*   CREATININE mg/dL 1 68*  < > 1 29   CALCIUM mg/dL 8 3  < > 8 6   TOTAL PROTEIN g/dL  --   --  7 5   BILIRUBIN TOTAL mg/dL  --   --  0 30   ALK PHOS U/L  --   --  275*   ALT U/L  --   --  33   AST U/L  --   --  34   GLUCOSE RANDOM mg/dL 192*  < > 209*   < > = values in this interval not displayed      Results from last 7 days  Lab Units 05/09/18  1449   INR  1 02         Last 24 Hours Medication List:     Current Facility-Administered Medications:  acetaminophen 650 mg Oral Q6H PRN Chuy Grant MD   albuterol 2 5 mg Nebulization Q4H PRN Chuy Grant MD   aspirin 81 mg Oral Daily Chuy Grant MD   atorvastatin 20 mg Oral After Parveen Matta MD   bacitracin 1 large application Topical BID Chuy Grant MD   carvedilol 12 5 mg Oral BID With Meals Chuy Grant MD   cholecalciferol 400 Units Oral Daily Chuy Grant MD   furosemide 40 mg Intravenous BID (diuretic) Birdia Rubinstein, DO   heparin (porcine) 5,000 Units Subcutaneous Q8H [address] Dell Maza MD   HYDROmorphone 0 5 mg Intravenous Q4H PRN Dell Maza MD   insulin glargine 75 Units Subcutaneous HS Dell Maza MD   insulin lispro 1-6 Units Subcutaneous 4x Daily (AC & HS) Dell Maza MD   levothyroxine 25 mcg Oral Daily Before Breakfast Dell Maza MD   ondansetron 4 mg Intravenous Q4H PRN Dell Maza MD          Time Spent for Care: 38 minutes  More than 50% of total time spent on counseling and coordination of care as described above  Current Length of Stay: 1 day(s)      Code Status: Level 1 - Full Code          ** Please Note: This note is constructed using a voice recognition dictation system   **

## 2018-05-11 LAB
ANION GAP SERPL CALCULATED.3IONS-SCNC: 9 MMOL/L (ref 4–13)
BASOPHILS # BLD AUTO: 0.04 THOUSANDS/ΜL (ref 0–0.1)
BASOPHILS NFR BLD AUTO: 0 % (ref 0–1)
BUN SERPL-MCNC: 47 MG/DL (ref 5–25)
CALCIUM SERPL-MCNC: 8.3 MG/DL (ref 8.3–10.1)
CHLORIDE SERPL-SCNC: 94 MMOL/L (ref 100–108)
CK MB SERPL-MCNC: 1.6 % (ref 0–2.5)
CK MB SERPL-MCNC: 14 NG/ML (ref 0–5)
CK SERPL-CCNC: 881 U/L (ref 39–308)
CO2 SERPL-SCNC: 27 MMOL/L (ref 21–32)
CREAT SERPL-MCNC: 2.09 MG/DL (ref 0.6–1.3)
EOSINOPHIL # BLD AUTO: 0.15 THOUSAND/ΜL (ref 0–0.61)
EOSINOPHIL NFR BLD AUTO: 1 % (ref 0–6)
ERYTHROCYTE [DISTWIDTH] IN BLOOD BY AUTOMATED COUNT: 15.1 % (ref 11.6–15.1)
GFR SERPL CREATININE-BSD FRML MDRD: 44 ML/MIN/1.73SQ M
GLUCOSE SERPL-MCNC: 194 MG/DL (ref 65–140)
GLUCOSE SERPL-MCNC: 218 MG/DL (ref 65–140)
GLUCOSE SERPL-MCNC: 231 MG/DL (ref 65–140)
GLUCOSE SERPL-MCNC: 256 MG/DL (ref 65–140)
GLUCOSE SERPL-MCNC: 266 MG/DL (ref 65–140)
HCT VFR BLD AUTO: 24.8 % (ref 36.5–49.3)
HGB BLD-MCNC: 8 G/DL (ref 12–17)
LYMPHOCYTES # BLD AUTO: 1.73 THOUSANDS/ΜL (ref 0.6–4.47)
LYMPHOCYTES NFR BLD AUTO: 16 % (ref 14–44)
MCH RBC QN AUTO: 28.8 PG (ref 26.8–34.3)
MCHC RBC AUTO-ENTMCNC: 32.3 G/DL (ref 31.4–37.4)
MCV RBC AUTO: 89 FL (ref 82–98)
MONOCYTES # BLD AUTO: 0.62 THOUSAND/ΜL (ref 0.17–1.22)
MONOCYTES NFR BLD AUTO: 6 % (ref 4–12)
NEUTROPHILS # BLD AUTO: 8.35 THOUSANDS/ΜL (ref 1.85–7.62)
NEUTS SEG NFR BLD AUTO: 77 % (ref 43–75)
NRBC BLD AUTO-RTO: 0 /100 WBCS
PLATELET # BLD AUTO: 523 THOUSANDS/UL (ref 149–390)
PMV BLD AUTO: 9.9 FL (ref 8.9–12.7)
POTASSIUM SERPL-SCNC: 4.2 MMOL/L (ref 3.5–5.3)
RBC # BLD AUTO: 2.78 MILLION/UL (ref 3.88–5.62)
SODIUM SERPL-SCNC: 130 MMOL/L (ref 136–145)
WBC # BLD AUTO: 10.89 THOUSAND/UL (ref 4.31–10.16)

## 2018-05-11 PROCEDURE — G8988 SELF CARE GOAL STATUS: HCPCS

## 2018-05-11 PROCEDURE — G8979 MOBILITY GOAL STATUS: HCPCS

## 2018-05-11 PROCEDURE — G8987 SELF CARE CURRENT STATUS: HCPCS

## 2018-05-11 PROCEDURE — 82948 REAGENT STRIP/BLOOD GLUCOSE: CPT

## 2018-05-11 PROCEDURE — 99232 SBSQ HOSP IP/OBS MODERATE 35: CPT | Performed by: HOSPITALIST

## 2018-05-11 PROCEDURE — 99232 SBSQ HOSP IP/OBS MODERATE 35: CPT | Performed by: INTERNAL MEDICINE

## 2018-05-11 PROCEDURE — 85025 COMPLETE CBC W/AUTO DIFF WBC: CPT | Performed by: INTERNAL MEDICINE

## 2018-05-11 PROCEDURE — 84166 PROTEIN E-PHORESIS/URINE/CSF: CPT | Performed by: INTERNAL MEDICINE

## 2018-05-11 PROCEDURE — 97163 PT EVAL HIGH COMPLEX 45 MIN: CPT

## 2018-05-11 PROCEDURE — G8978 MOBILITY CURRENT STATUS: HCPCS

## 2018-05-11 PROCEDURE — 80048 BASIC METABOLIC PNL TOTAL CA: CPT | Performed by: INTERNAL MEDICINE

## 2018-05-11 PROCEDURE — 82553 CREATINE MB FRACTION: CPT | Performed by: INTERNAL MEDICINE

## 2018-05-11 PROCEDURE — 82550 ASSAY OF CK (CPK): CPT | Performed by: INTERNAL MEDICINE

## 2018-05-11 PROCEDURE — 97166 OT EVAL MOD COMPLEX 45 MIN: CPT

## 2018-05-11 RX ORDER — LIDOCAINE 50 MG/G
1 PATCH TOPICAL DAILY
Status: DISCONTINUED | OUTPATIENT
Start: 2018-05-11 | End: 2018-05-16 | Stop reason: HOSPADM

## 2018-05-11 RX ORDER — CYCLOBENZAPRINE HCL 10 MG
10 TABLET ORAL 3 TIMES DAILY PRN
Status: DISCONTINUED | OUTPATIENT
Start: 2018-05-11 | End: 2018-05-16 | Stop reason: HOSPADM

## 2018-05-11 RX ORDER — FUROSEMIDE 10 MG/ML
80 INJECTION INTRAMUSCULAR; INTRAVENOUS EVERY 8 HOURS
Status: DISCONTINUED | OUTPATIENT
Start: 2018-05-11 | End: 2018-05-13

## 2018-05-11 RX ORDER — FUROSEMIDE 10 MG/ML
80 INJECTION INTRAMUSCULAR; INTRAVENOUS
Status: DISCONTINUED | OUTPATIENT
Start: 2018-05-11 | End: 2018-05-11

## 2018-05-11 RX ORDER — LACTULOSE 20 G/30ML
20 SOLUTION ORAL ONCE
Status: COMPLETED | OUTPATIENT
Start: 2018-05-11 | End: 2018-05-11

## 2018-05-11 RX ORDER — MORPHINE SULFATE 4 MG/ML
4 INJECTION, SOLUTION INTRAMUSCULAR; INTRAVENOUS EVERY 4 HOURS PRN
Status: DISCONTINUED | OUTPATIENT
Start: 2018-05-11 | End: 2018-05-12

## 2018-05-11 RX ADMIN — FUROSEMIDE 40 MG: 10 INJECTION, SOLUTION INTRAMUSCULAR; INTRAVENOUS at 09:04

## 2018-05-11 RX ADMIN — HEPARIN SODIUM 5000 UNITS: 5000 INJECTION, SOLUTION INTRAVENOUS; SUBCUTANEOUS at 09:37

## 2018-05-11 RX ADMIN — ATORVASTATIN CALCIUM 20 MG: 20 TABLET, FILM COATED ORAL at 18:03

## 2018-05-11 RX ADMIN — INSULIN GLARGINE 75 UNITS: 100 INJECTION, SOLUTION SUBCUTANEOUS at 21:26

## 2018-05-11 RX ADMIN — LIDOCAINE 1 PATCH: 50 PATCH CUTANEOUS at 09:37

## 2018-05-11 RX ADMIN — INSULIN LISPRO 2 UNITS: 100 INJECTION, SOLUTION INTRAVENOUS; SUBCUTANEOUS at 12:32

## 2018-05-11 RX ADMIN — BACITRACIN ZINC 1 LARGE APPLICATION: 500 OINTMENT TOPICAL at 09:04

## 2018-05-11 RX ADMIN — LACTULOSE 20 G: 20 SOLUTION ORAL at 09:37

## 2018-05-11 RX ADMIN — BACITRACIN ZINC 1 LARGE APPLICATION: 500 OINTMENT TOPICAL at 18:03

## 2018-05-11 RX ADMIN — FUROSEMIDE 80 MG: 10 INJECTION, SOLUTION INTRAMUSCULAR; INTRAVENOUS at 16:22

## 2018-05-11 RX ADMIN — LEVOTHYROXINE SODIUM 25 MCG: 25 TABLET ORAL at 09:04

## 2018-05-11 RX ADMIN — CYCLOBENZAPRINE HYDROCHLORIDE 10 MG: 10 TABLET, FILM COATED ORAL at 20:20

## 2018-05-11 RX ADMIN — INSULIN LISPRO 3 UNITS: 100 INJECTION, SOLUTION INTRAVENOUS; SUBCUTANEOUS at 21:27

## 2018-05-11 RX ADMIN — MORPHINE SULFATE 4 MG: 4 INJECTION, SOLUTION INTRAMUSCULAR; INTRAVENOUS at 13:52

## 2018-05-11 RX ADMIN — CARVEDILOL 12.5 MG: 6.25 TABLET, FILM COATED ORAL at 09:04

## 2018-05-11 RX ADMIN — MORPHINE SULFATE 4 MG: 4 INJECTION, SOLUTION INTRAMUSCULAR; INTRAVENOUS at 09:37

## 2018-05-11 RX ADMIN — HEPARIN SODIUM 5000 UNITS: 5000 INJECTION, SOLUTION INTRAVENOUS; SUBCUTANEOUS at 13:52

## 2018-05-11 RX ADMIN — MORPHINE SULFATE 4 MG: 4 INJECTION, SOLUTION INTRAMUSCULAR; INTRAVENOUS at 22:28

## 2018-05-11 RX ADMIN — HEPARIN SODIUM 5000 UNITS: 5000 INJECTION, SOLUTION INTRAVENOUS; SUBCUTANEOUS at 21:26

## 2018-05-11 RX ADMIN — INSULIN LISPRO 3 UNITS: 100 INJECTION, SOLUTION INTRAVENOUS; SUBCUTANEOUS at 09:05

## 2018-05-11 RX ADMIN — CHOLECALCIFEROL TAB 10 MCG (400 UNIT) 400 UNITS: 10 TAB at 09:04

## 2018-05-11 RX ADMIN — CARVEDILOL 12.5 MG: 6.25 TABLET, FILM COATED ORAL at 16:23

## 2018-05-11 RX ADMIN — ASPIRIN 81 MG 81 MG: 81 TABLET ORAL at 09:04

## 2018-05-11 RX ADMIN — INSULIN LISPRO 2 UNITS: 100 INJECTION, SOLUTION INTRAVENOUS; SUBCUTANEOUS at 16:23

## 2018-05-11 RX ADMIN — MORPHINE SULFATE 4 MG: 4 INJECTION, SOLUTION INTRAMUSCULAR; INTRAVENOUS at 18:03

## 2018-05-11 NOTE — PROGRESS NOTES
Progress Note - Nilesh November 34 y o  male MRN: [id number]    Unit/Bed#: [address] 2 -01 Encounter: [id number]    Principal Problem:    Traumatic rhabdomyolysis s/p fall   Active Problems:    Hypertension    Hypothyroidism     Acute diastolic congestive heart failure     GARO (acute kidney injury) ([facility] )    history of Asthma    Hx of BKA, left (HCC)    Morbid obesity     Insulin dependent diabetes mellitus     Hyperlipidemia    Anemia of chronic disease      Assessment/Plan:  Acute traumatic rhabdomyolysis  · s/p fall onto LLE stump 05/9/2018  off chair from sitting position - CPK @ 1277 on presentation improved to 81 today status post low-rate IV fluids due to concurrent diastolic CHF - acute renal injury noted (see plan below) - trend daily CPK - IV fluids to be stopped  due to worsening congestion on CXR     2   Acute kidney injury  · serum creatinine worsened to 2 09 today from 1 49 on 05/10/2018 (was 1 18 just five days ago - likely due to recent introduction of diuretics (Lasix) for acute CHF (see plan below) coupled with traumatic rhabdomyolysis requiring opposing IV fluids yesterday  · monitor renal function and limit/avoid nephrotoxins if possible - appreciate nephrology input - as Lasix increased to BID today, monitor renal function carefully     3   history of Left BKA with significant swelling of the left anterior thigh as well as anterior thigh muscular area  No evidence of compartment syndrome currently  · PRN pain control - PT/OT as tolerated - supportive care - wound care input to be appreciated for bruise/skin tear on left knee s/p fall today - patient reassured    · XR of femur s/p fall negative for oosseous abnormalities per reading radiologist - followup CT of left femur revealing diffuse left anterior thigh subcutaneous/muscular edema consistent with post traumatic injury per reading radiologist   · Patient on morphine 4 mg q 4 hours p r n   Currently-plan to decrease this to 2 mg from tomorrow  Added Lidoderm and Flexeril     4   Acute Diastolic CHF  · Recent diagnosis (just discharged two days ago prior to this readmission) - EF of 60% w/ mild MR and mild-moderate TR but no evidence of wall motion abnormalities per reading cardiologist   · increased Lasix to 40 mg BID today per nephrology due to CXR findings as below - monitor I/Os and daily weights - continue beta-blockade w/ Coreg    · CXR today revealed central pulmonary congestion with worsening opacifications  · cardiology input appreciated  · serum potassium and magnesium within normal limits - pro-BNP of 1504 two days ago at 2240 today - IV fluids for rhabdomyolysis on admission stopped      5   Morbid Obesity  · BMI of 41 52   · Lifestyle/diet modifications encouraged      6   Hypothyroidism  · Continue Synthroid - TSH within normal limits     7   Insulin dependent diabetes mellitus  · HgA1c uncontrolled at 14 8 - c/w basal insulin regimen w/ additional SSI coverage per accuchecks - strong suspicion for noncompliance to insulin regimen at home as inpatient Accu-Chek readings are in the 140-230 range     8   Essential hypertension  · Low-sodium diet encouraged - c/w Coreg - held Zestril due to GARO      9   Hyperlipidemia   · c/w ASA/Lipitor - LDL of 89 on lipid panel earlier this week      10   Anemia of chronic disease  · H/H stable      11   history of Asthma  · Stable/asymptomatic - PRN Albuterol nebulizer treatments on board         DVT Prophylaxis:  Heparin SC      Subjective:  Patient still complaining of significant pain over the left thigh  CPK trending down to 881  Creatinine has bumped up to 2 09  Likely secondary to increase in Lasix to b i d   Patient denies any shortness of breath or chest pain  Physical Exam:   Vitals: Blood pressure 148/83, pulse 78, temperature 98 7 °F (37 1 °C), temperature source Temporal, resp  rate 20, weight 103 kg (227 lb), SpO2 98 %  ,Body mass index is 41 52 kg/m²          Gen:  Pleasant, non-tachypnic, non-dyspnic  Conversant  Heart: regular rate and rhythm, S1S2 present, no murmur, rub or gallop  Lungs: clear to ausculatation bilaterally  No wheezing, crackless, or rhonchi  No accessory muscle use or respiratory distress  Abd: soft, non-tender, non-distended  NABS, no guarding, rebound or peritoneal signs  Extremities:  Left BKA  Neuro: awake, alert and oriented  Cranial nerves 2-12 intact  Strength and sensation grossly intact  Skin: warm and dry: no petechiae, purpura and rash      LABS:     Results from last 7 days  Lab Units 05/11/18  0447 05/10/18  0603 05/09/18  1449   WBC Thousand/uL 10 89* 12 95* 12 41*   HEMOGLOBIN g/dL 8 0* 7 8* 9 0*   HEMATOCRIT % 24 8* 24 4* 27 4*   PLATELETS Thousands/uL 523* 469*  436* 494*       Results from last 7 days  Lab Units 05/11/18  0447 05/10/18  0603 05/09/18  1449   SODIUM mmol/L 130* 132* 135*   POTASSIUM mmol/L 4 2 4 2 4 7   CHLORIDE mmol/L 94* 97* 98*   CO2 mmol/L 27 28 28   BUN mg/dL 47* 36* 36*   CREATININE mg/dL 2 09* 1 68* 1 49*   GLUCOSE RANDOM mg/dL 231* 192* 128   CALCIUM mg/dL 8 3 8 3 8 4       Intake/Output Summary (Last 24 hours) at 05/11/18 7169  Last data filed at 05/10/18 1101   Gross per 24 hour   Intake                0 ml   Output              450 ml   Net             -450 ml           Current Facility-Administered Medications:  acetaminophen 650 mg Oral Q6H PRN Francis Malhotra MD   albuterol 2 5 mg Nebulization Q4H PRN Francis Malhotra MD   aspirin 81 mg Oral Daily Francis Malhotra MD   atorvastatin 20 mg Oral After Christian Anderson MD   bacitracin 1 large application Topical BID Francis Malhotra MD   carvedilol 12 5 mg Oral BID With Meals Francis Malohtra MD   cholecalciferol 400 Units Oral Daily Francis Malhotra MD   cyclobenzaprine 10 mg Oral TID PRN Ignacio Emery MD   furosemide 80 mg Intravenous BID (diuretic) Kirill Cole DO   heparin (porcine) 5,000 Units Subcutaneous Q8H [address] Francis Malhotra MD   insulin glargine 75 Units Subcutaneous HS Hilario Lefort, MD   insulin lispro 1-6 Units Subcutaneous 4x Daily (AC & HS) Hilario Lefort, MD   lactulose 20 g Oral Once Francisca Coronado MD   levothyroxine 25 mcg Oral Daily Before Breakfast Hilario Lefort, MD   lidocaine 1 patch Transdermal Daily Francisca Coronado MD   morphine injection 4 mg Intravenous Q4H PRN Francisca Coronado MD   ondansetron 4 mg Intravenous Q4H PRN Hilario Lefort, MD

## 2018-05-11 NOTE — PLAN OF CARE
Problem: DISCHARGE PLANNING - CARE MANAGEMENT  Goal: Discharge to post-acute care or home with appropriate resources  INTERVENTIONS:  - Conduct assessment to determine patient/family and health care team treatment goals, and need for post-acute services based on payer coverage, community resources, and patient preferences, and barriers to discharge  - Address psychosocial, clinical, and financial barriers to discharge as identified in assessment in conjunction with the patient/family and health care team  - Arrange appropriate level of post-acute services according to patients   needs and preference and payer coverage in collaboration with the physician and health care team  - Communicate with and update the patient/family, physician, and health care team regarding progress on the discharge plan  - Arrange appropriate transportation to post-acute venues  Outcome: Progressing  CM to follow if any further needs are determined  Daughter to transport

## 2018-05-11 NOTE — PLAN OF CARE
Problem: PHYSICAL THERAPY ADULT  Goal: Performs mobility at highest level of function for planned discharge setting  See evaluation for individualized goals  Treatment/Interventions: Functional transfer training, LE strengthening/ROM, Therapeutic exercise, Elevations, Endurance training, Patient/family training, Bed mobility, Gait training, Spoke to nursing, OT  Equipment Recommended: Cari Covarrubias (AP)       See flowsheet documentation for full assessment, interventions and recommendations  Outcome: Progressing  Prognosis: Good  Problem List: Decreased strength, Decreased endurance, Impaired balance, Decreased mobility, Obesity, Pain  Assessment: Pt  38 y o male admitted for traumatic rhabdomyolysis due to a fall at home, landing on L knee &/or stump  Pt also found to have GARO, CHF & acute skin tear to L knee  Pt referred to PT for mobility assessment & D/C planning  Pt cleared for activity as tolerated  PTA, pt reports being I w/ B/L AC; of note, pt from [facility]; visiting & staying w/ his daughter here in [place]; pt going back to [facility] on 5/20/18  On eval, pt demonstrate dec mobility, balance, endurance & amb 2* to LLE pain  (+) limited L knee ROM & strength 2* to pain  Pt I w/ bed mobility, S for transfers & minAx1 for amb w/ RW + LLE prosthesis + cues for techniques  Gait deviations as above but no gross LOB noted  No inc pain reported during standing & amb w/ RW  Pt require minAx1 for donning prosthesis but I doffing prosthesis  Pt extremely satisfied w/ using RW for amb hence will continue gait training w/ RW  Will continue skilled PT to improve function & safety  At this time, will anticipate good progress in PT for safe D/C to home  Pt will need a RW at D/C  Pt will need to achieve PT goals prior to D/C for safe return to home  CM to follow  Pt back in bed at end of session w/o issues  Call bell in reach   Nsg staff to continue to mobilized pt (OOB in chair for all meals & ambulate in room/unit) as tolerated to prevent further decline in function  Nsg notified  Barriers to Discharge: Inaccessible home environment, Decreased caregiver support     Recommendation: Home with family support          See flowsheet documentation for full assessment

## 2018-05-11 NOTE — SOCIAL WORK
CM met with patient at bedside to address discharge needs  Patient lives in a 2nd floor apartment with his daughter, Makayla Blunt; [address]  Patient claims difficulty with steps  Patient is independent with ADLs and functional mobility, and he just takes it slow  Support system is daughter  Food shopping & meal prep is done by daughter  Patient uses a RW due to having a left BKA  Patient is able to ambulate without assistance from a seated or laying position  Hx of VNA reported for wound care back in [facility]  Patient is not employed  There is no PCP [place]  No POA identified, daughter is however healthcare representative, spokesperson for the family and designated caregiver if/when needed  Patient uses [email or web address] in [facility] for Rx needs  Patient does not drive as he does not have a car here, he does drive at home; reports daughter available at discharge to transport home  Limited help/support reported when patient is home as daughter works full time  CM secured a script from attending for a RW as per PT's recommendation, however, after speaking with patient he does not have insurance and reports he has a RW at home and does not want to purchase one here because he is flying back on the 20th  CM suggested that daughter search in the [employer] to pick one up very inexpensively that can be used trying that interm

## 2018-05-11 NOTE — PROGRESS NOTES
NEPHROLOGY PROGRESS NOTE   Kai Case 34 y o  male MRN: [id number]  Unit/Bed#: [address] [id number] Encounter: [id number]  Reason for Consult:  Acute kidney injury    Assessment/Plan:  1  Acute kidney injury, likely multifactorial, could be component of mild rhabdomyolysis, also could be a component of cardiorenal syndrome given increasing weight and hyponatremia  2  Chronic kidney disease, baseline creatinine 1 2-1 3  3  Nephrotic range proteinuria, likely secondary to diabetic nephropathy, patient has had diabetes for over 20 years, complicated by severe diabetic retinopathy with partial blindness   4  Hyponatremia, multifactorial given hyperglycemia and component of volume overload  5  Diastolic heart failure, ejection fraction 60%  6  Acute on chronic anemia, continue to monitor, may require blood transfusion  7  Hypertension, hold ACE-inhibitor    PLAN:  · Overall volume status seems to be slightly worse, increasing weight, noted chest x-ray findings  · Again suspect some component of cardiorenal syndrome, marginal urine output yesterday, increase Lasix to 80 mg 3 times daily  · Consider Zaroxolyn of no significant increase in urine output  · Monitor renal function closely  · Check serum electrophoresis given patient's nephrotic range proteinuria    SUBJECTIVE:  Seen and examined  Patient awake and alert  Complains of occasional abdominal bloating which affected his appetite yesterday  No significant chest pain or shortness of Breath    This is his complaints are secondary to left lower extremity pain    Review of Systems    OBJECTIVE:  Current Weight: Weight - Scale: 103 kg (227 lb)  Vitals:    05/10/18 0702 05/10/18 1520 05/10/18 2308 05/11/18 0755   BP: 131/84 123/85 100/70 148/83   BP Location: Left arm Right arm Right arm Left arm   Pulse: 85 74 74 78   Resp: 20 20 18 20   Temp: (!) 97 2 °F (36 2 °C) 97 8 °F (36 6 °C) 97 8 °F (36 6 °C) 98 7 °F (37 1 °C)   TempSrc: Temporal Temporal Temporal Temporal SpO2: 95% 96% 92% 98%   Weight:           Intake/Output Summary (Last 24 hours) at 05/11/18 1302  Last data filed at 05/11/18 1235   Gross per 24 hour   Intake              240 ml   Output             1450 ml   Net            -1210 ml       Physical Exam   Constitutional: He is oriented to person, place, and time  No distress  HENT:   Head: Normocephalic  Eyes: No scleral icterus  Neck: Neck supple  Cardiovascular: Normal rate and regular rhythm  Pulmonary/Chest: Effort normal and breath sounds normal    Abdominal: Soft  He exhibits distension  There is no tenderness  Musculoskeletal: He exhibits edema (Right lower extremity edema, left BKA)  Neurological: He is alert and oriented to person, place, and time  Skin: Skin is warm and dry  Psychiatric: He has a normal mood and affect         Medications:    Current Facility-Administered Medications:     acetaminophen (TYLENOL) tablet 650 mg, 650 mg, Oral, Q6H PRN, Rafaela Peres MD    albuterol inhalation solution 2 5 mg, 2 5 mg, Nebulization, Q4H PRN, Rafaela Peres MD    aspirin chewable tablet 81 mg, 81 mg, Oral, Daily, Rafaela Peres MD, 81 mg at 05/11/18 0904    atorvastatin (LIPITOR) tablet 20 mg, 20 mg, Oral, After Angela Hagen MD, 20 mg at 05/10/18 1744    bacitracin topical ointment 1 large application, 1 large application, Topical, BID, Rafaela Peres MD, 1 large application at 11/81/88 0904    carvedilol (COREG) tablet 12 5 mg, 12 5 mg, Oral, BID With Meals, Rafaela Peres MD, 12 5 mg at 05/11/18 0904    cholecalciferol (VITAMIN D3) tablet 400 Units, 400 Units, Oral, Daily, Rafaela Peres MD, 400 Units at 05/11/18 0904    cyclobenzaprine (FLEXERIL) tablet 10 mg, 10 mg, Oral, TID PRN, Carmen Kent MD    furosemide (LASIX) injection 80 mg, 80 mg, Intravenous, BID (diuretic), Lydia Kidd DO    heparin (porcine) subcutaneous injection 5,000 Units, 5,000 Units, Subcutaneous, Q8H [address], 5,000 Units at 05/11/18 4347 **AND** Platelet count, , , Once, Lauren Vega MD    insulin glargine (LANTUS) subcutaneous injection 75 Units 0 75 mL, 75 Units, Subcutaneous, HS, Lauren Vega MD, 75 Units at 05/10/18 2148    insulin lispro (HumaLOG) 100 units/mL subcutaneous injection 1-6 Units, 1-6 Units, Subcutaneous, 4x Daily (AC & HS), 2 Units at 05/11/18 1232 **AND** Fingerstick Glucose (POCT), , , 4x Daily AC and at bedtime, Lauren Vega MD    levothyroxine tablet 25 mcg, 25 mcg, Oral, Daily Before Breakfast, Lauren Vega MD, 25 mcg at 05/11/18 0904    lidocaine (LIDODERM) 5 % patch 1 patch, 1 patch, Transdermal, Daily, Otis Hickey MD, 1 patch at 05/11/18 0937    morphine (PF) 4 mg/mL injection 4 mg, 4 mg, Intravenous, Q4H PRN, Otis Hickey MD, 4 mg at 05/11/18 0937    ondansetron (ZOFRAN) injection 4 mg, 4 mg, Intravenous, Q4H PRN, Lauren Vega MD, 4 mg at 05/10/18 2201    Laboratory Results:    Results from last 7 days  Lab Units 05/11/18  0447 05/10/18  0603 05/09/18  1449 05/08/18  0450 05/07/18  0505 05/07/18  0454 05/06/18  0700 05/05/18  1323 05/05/18  1054   WBC Thousand/uL 10 89* 12 95* 12 41* 11 16*  --   --  10 68*  --  11 45*   HEMOGLOBIN g/dL 8 0* 7 8* 9 0* 8 7*  --   --  9 6*  --  9 6*   HEMATOCRIT % 24 8* 24 4* 27 4* 27 4*  --   --  30 3*  --  29 8*   PLATELETS Thousands/uL 523* 469*  436* 494* 425* 424*  --  403*  --  429*   SODIUM mmol/L 130* 132* 135* 135*  --  135* 135* 138 135*   POTASSIUM mmol/L 4 2 4 2 4 7 3 7  --  3 4* 3 5 2 8* 6 1*   CHLORIDE mmol/L 94* 97* 98* 100  --  100 100 102 102   CO2 mmol/L 27 28 28 29  --  29 31 28 31   BUN mg/dL 47* 36* 36* 34*  --  32* 26* 18 19   CREATININE mg/dL 2 09* 1 68* 1 49* 1 34*  --  1 26 1 29 1 36* 1 18   CALCIUM mg/dL 8 3 8 3 8 4 8 7  --  8 5 8 6 8 8 8 4   MAGNESIUM mg/dL  --  2 0  --   --   --   --  2 0  --   --    PHOSPHORUS mg/dL  --  5 0*  --   --   --   --   --   --   --    TOTAL PROTEIN g/dL  --   --   --   --   --   --  7 5  --  8 0   GLUCOSE RANDOM mg/dL 231* 192* 128 111  --  140 209* 227* 207*

## 2018-05-11 NOTE — PHYSICAL THERAPY NOTE
PT EVALUATION    Pt  Name: Cassie Shea  Pt  Age: 34 y o  Patient Active Problem List   Diagnosis    Hypertension    Hypothyroidism     Acute diastolic congestive heart failure     [facility] [address] [facility]    history of Asthma    Hx of BKA, left ([facility] [address] )    Traumatic rhabdomyolysis s/p fall     Morbid obesity     Insulin dependent diabetes mellitus     Hyperlipidemia    Anemia of chronic disease       Rhabdomyolysis [M62 82]  Hip pain [M25 559]  Left thigh pain [M79 652]    Past Medical History:   Diagnosis Date    Diabetes mellitus ([facility] [address] )     Disease of thyroid gland     Hypertension     Neuropathy        Past Surgical History:   Procedure Laterality Date    LEG AMPUTATION      LEG SURGERY          05/11/18 1147   Note Type   Note type Eval only   Pain Assessment   Pain Score 3   Pain Type Acute pain   Pain Location Other (Comment)  (thigh)   Pain Orientation Left; Anterior   Hospital Pain Intervention(s) Medication (See MAR); Repositioned; Ambulation/increased activity; Emotional support   Home Living   Type of Home Apartment  (daughter's apartment; pt lives in [facility])   Home Layout One level;Stairs to enter with rails  (1+12STE )   Home Equipment Crutches  (pt on has his [facility] here in [place] but has w/c & BSC in [facility])   Additional Comments pt visiting from [facility]; staying w/ his daughter here in [place]   Prior Function   Level of [place] Independent with ADLs and functional mobility  (w/ B/L AC + L prosthetic leg)   Lives With Daughter  (here in [place]; lives w/ family in [place])   [place] in the last 6 months 1 to 4  (1x)   Comments pt going back to [place] on 5/20/18   Restrictions/Precautions   Weight Bearing Precautions Per Order No   Braces or Orthoses Prosthesis  (LLE)   Other Precautions Fall Risk;Pain   General   Additional Pertinent History recent hospital admission on 5/5-5/9/18 2* to CHF; pt presented back on 5/10 2* to fall at home, landing on L knee &/or stump, unable to get up  Family/Caregiver Present No   Cognition   Overall Cognitive Status WFL   Arousal/Participation Alert   Orientation Level Oriented X4   Following Commands Follows all commands and directions without difficulty   RUE Assessment   RUE Assessment (defer to OT )   LUE Assessment   LUE Assessment (defer to OT)   RLE Assessment   RLE Assessment WFL   Strength RLE   RLE Overall Strength 4+/5   R Ankle Dorsiflexion 3+/5   LLE Assessment   LLE Assessment X   Strength LLE   LLE Overall Strength 4/5   L Knee Flexion 2+/5  (2* to pain)   L Knee Extension 3+/5  (2* to pain)   Coordination   Movements are Fluid and Coordinated 1   Sensation WFL   Bed Mobility   Supine to Sit 7  Independent   Sit to Supine 7  Independent   Transfers   Sit to Stand 5  Supervision   Additional items Bedrails; Increased time required   Stand to Sit 5  Supervision   Additional items Bedrails; Increased time required   Ambulation/Elevation   Gait pattern Decreased L stance; Step to;Excessively slow; Wide ROBERTO   Gait Assistance 4  Minimal assist   Additional items Assist x 1;Verbal cues; Tactile cues   Assistive Device Rolling walker;Prosthetic device   Distance 60'x1   Stair Management Assistance Not tested   Balance   Static Sitting Normal   Static Standing Fair   Ambulatory Fair -   Activity Tolerance   Activity Tolerance Patient limited by pain   Nurse [address]   Assessment   Prognosis Good   Problem List Decreased strength;Decreased endurance; Impaired balance;Decreased mobility;Obesity;Pain   Assessment Pt  38 y o male admitted for traumatic rhabdomyolysis due to a fall at home, landing on L knee &/or stump  Pt also found to have GARO, CHF & acute skin tear to L knee  Pt referred to PT for mobility assessment & D/C planning  Pt cleared for activity as tolerated   PTA, pt reports being I w/ B/L AC; of note, pt from [facility]; visiting & staying w/ his daughter here in [place]; pt going back to [facility] on 5/20/18  On eval, pt demonstrate dec mobility, balance, endurance & amb 2* to LLE pain  (+) limited L knee ROM & strength 2* to pain  Pt I w/ bed mobility, S for transfers & minAx1 for amb w/ RW + LLE prosthesis + cues for techniques  Gait deviations as above but no gross LOB noted  No inc pain reported during standing & amb w/ RW  Pt require minAx1 for donning prosthesis but I doffing prosthesis  Pt extremely satisfied w/ using RW for amb hence will continue gait training w/ RW  Will continue skilled PT to improve function & safety  At this time, will anticipate good progress in PT for safe D/C to home  Pt will need a RW at D/C  Pt will need to achieve PT goals prior to D/C for safe return to home  CM to follow  Pt back in bed at end of session w/o issues  Call bell in reach  Nsg staff to continue to mobilized pt (OOB in chair for all meals & ambulate in room/unit) as tolerated to prevent further decline in function  Nsg notified  Barriers to Discharge Inaccessible home environment;Decreased caregiver support   Goals   Patient Goals get better   STG Expiration Date 05/25/18   Short Term Goal #1 Goals to be met in 14 days; pt will be able to: 1) inc strength & balance by 1 grade; 2) inc transfers to modified I; 3) inc amb w/ RW + LLE prosthesis approx  >200' w/ modified I; 4) inc barthel score to 75; 5) negotiate stairs w/ S; 6) pt/caregiver ed   Treatment Day 0   Plan   Treatment/Interventions Functional transfer training;LE strengthening/ROM; Therapeutic exercise;Elevations; Endurance training;Patient/family training;Bed mobility;Gait training;Spoke to nursing;OT   PT Frequency 5x/wk; Weekend   Recommendation   Recommendation Home with family support   Equipment Recommended Walker  (RW)   Barthel Index   Feeding 10   Bathing 0   Grooming Score 5   Dressing Score 5   Bladder Score 10   Bowels Score 10   Toilet Use Score 5   Transfers (Bed/Chair) Score 10   Mobility (Level Surface) Score 0   Stairs Score 0   Barthel Index Score 55   Hx/personal factors: co-morbidities; fall risk; currently functioning below baseline; inaccessible home; dec caregiver; obesity; use of AD; prosthesis  Examination: dec mobility, dec balance, dec endurance, dec amb, high risk for falls; LLE pain & weakness  Clinical: unpredictable (ongoing medical status; abnormal lab values; fall risk)  Complexity: high    Raheem Slice, PT

## 2018-05-11 NOTE — SOCIAL WORK
HEART FAILURE CARE MANAGER: Patient know to me and is a Heart Failure Readmission since his discharge PDX from 5/8 with Hypertensive Heart Disease with Heart Failure  Re-visit to discuss readmission and patient did not have a exacerbation of Heart Failure but is status post a fall at home  I will follow again when discharged for 30 days  Patient had been very engaged in following strategies to monitor for signs and symptoms of Heart Failure at home

## 2018-05-11 NOTE — SOCIAL WORK
LATE ENTRY: Yesterday CM sent email to [address] counselor notifying her that patient does not have any insurance listed

## 2018-05-11 NOTE — OCCUPATIONAL THERAPY NOTE
[address]  Evaluation     Patient Name: Leno Huff  ZBUHP'L Date: 5/11/2018  Problem List  Patient Active Problem List   Diagnosis    Hypertension    Hypothyroidism     Acute diastolic congestive heart failure     [facility]) [facility]    history of Asthma    Hx of BKA, left ([facility] )    Traumatic rhabdomyolysis s/p fall     Morbid obesity     Insulin dependent diabetes mellitus     Hyperlipidemia    Anemia of chronic disease     Past Medical History  Past Medical History:   Diagnosis Date    Diabetes mellitus ([facility]     Disease of thyroid gland     Hypertension     Neuropathy      Past Surgical History  Past Surgical History:   Procedure Laterality Date    LEG AMPUTATION      LEG SURGERY           05/11/18 1146   Note Type   Note type Eval only   Restrictions/Precautions   Weight Bearing Precautions Per Order No   Braces or Orthoses Prosthesis  (previous L BKA)   Other Precautions Fall Risk;Pain   Pain Assessment   Pain Assessment 0-10   Pain Score 3   Pain Type Acute pain   Pain Location Other (Comment)  (Thigh)   Pain Orientation Left; Anterior   Hospital Pain Intervention(s) Repositioned; Ambulation/increased activity; Emotional support   Response to Interventions Tolerated   Home Living   Type of Home Apartment  (on vacation visiting )   Home Layout One level;Stairs to enter with rails  ([address] building; 12 stairs to 2nd floor apt)   Bathroom Shower/Tub Tub/shower unit   [address]  (reports has W/C and BSC in [facility])   Additional Comments Pt is on vacation from [facility] visiting his dtr  He is staying w/ his dtr in a one level apt w/ 1 [address] and 12 stairs to 2nd floor apt  Pt reports dtr is able to assist as needed, however pt (+) home alone while dtr at work  Prior Function   Level of [place] Independent with ADLs and functional mobility; Needs assistance with IADLs   Lives With Daughter  (visting dtr in [place], lives in [facility] [place])   Receives Help From Family   ADL Assistance Independent   IADLs Needs assistance   Falls in the last 6 months 1 to 4  (1 fall leading to admission)   Comments At baseline, pt was I w/ ADLs and functional mobility/transfers w/ use of crutches, required assist w/ IADLs, (+) , and reports 1 fall PTA leading to admission  Lifestyle   Autonomy At baseline, pt was I w/ ADLs and functional mobility/transfers w/ use of crutches, required assist w/ IADLs, (+) , and reports 1 fall PTA leading to admission  Reciprocal Relationships Visiting dtr; Lives w/ spouse in [facility] (returning on 5/20/18)   Intrinsic Gratification Watching TV   Psychosocial   Psychosocial (WDL) WDL   ADL   Eating Assistance 7  Independent   Grooming Assistance 5  Supervision/Setup   UB Bathing Assistance 6  Modified Independent   LB Bathing Assistance 4  Minimal Assistance   UB Dressing Assistance 6  Modified independent   LB Dressing Assistance 4  Minimal Assistance   Toileting Assistance  5  Supervision/Setup   Bed Mobility   Supine to Sit 7  Independent   Sit to Supine 7  Independent   Additional Comments Pt seated EOB at end of session w/ call bell and phone within reach  All needs met and pt reports no further questions for OT at this time   Transfers   Sit to Stand 5  Supervision   Additional items Bedrails; Increased time required   Stand to Sit 5  Supervision   Additional items Increased time required; Bedrails   Additional Comments Cues for safety and technique   Functional Mobility   Functional Mobility 4  Minimal assistance   Additional Comments Assist x1   Additional items Rolling walker   Balance   Static Sitting Normal   Dynamic Sitting Fair +   Static Standing Fair   Dynamic Standing Fair -   Ambulatory Fair -   Activity Tolerance   Activity Tolerance Patient limited by pain   Nurse Made Aware Pt able to be seen per INGE AGRAWAL Assessment   RUE Assessment WFL   LUE Assessment   LUE Assessment WFL   Hand Function   Gross Motor Coordination Functional   Fine Motor Coordination Functional   Sensation   Light Touch No apparent deficits   Sharp/Dull No apparent deficits   Vision-Basic Assessment   Current Vision Wears glasses only for reading   Vision - Complex Assessment   Ocular Range of Motion [facility]   Acuity Able to read clock/calendar on wall without difficulty   Perception   Inattention/Neglect Appears intact   Cognition   Overall Cognitive Status [facility]   Arousal/Participation Alert; Cooperative   Attention Within functional limits   Orientation Level Oriented X4   Memory Within functional limits   Following Commands Follows all commands and directions without difficulty   Assessment   Limitation Decreased ADL status; Decreased endurance;Decreased self-care trans;Decreased high-level ADLs   Prognosis Good   Assessment Pt is a 34 y o  male seen for OT evaluation s/p adm to [facility] on 5/9/2018 w/ Traumatic rhabdomyolysis 2* fall at home and landing on L knee/stump  Comorbidities affecting pts functional performance include a significant PMH of DM, disease of thyroid gland, HTN, neuropathy, and hx of L BKA  Pt with active OT orders and activity orders for Activity as tolerated  Pt is on vacation from [facility] visiting his dtr  He is staying w/ his dtr in a one level apt w/ 1 [address] and 12 stairs to 2nd floor apt  Pt reports dtr is able to assist as needed, however pt (+) home alone while dtr at work  At baseline, pt was I w/ ADLs and functional mobility/transfers w/ use of crutches, required assist w/ IADLs, (+) , and reports 1 fall PTA leading to admission  Upon evaluation, pt currently requires Min A for LB ADLs, Supervision for toileting, Mod I for UB ADLs, Supervision for transfers, and Min A for functional mobility 2* the following deficits impacting occupational performance: weakness, decreased strength, decreased balance, decreased tolerance and increased pain   These impairments, as well at pts steps to enter environment and difficulty performing ADLS limit pts ability to safely engage in all baseline areas of occupation, including grooming, bathing, dressing, toileting, functional mobility/transfers, community mobility, social participation  and leisure activities   Pt scored overall 55/100 on the Barthel Index  Based on the aforementioned OT evaluation, functional performance deficits, and assessments, pt has been identified as a moderate complexity evaluation  Pt to continue to benefit from continued acute OT services during hospital stay to address defined deficits and to maximize level of functional independence in the following Occupational Performance areas: grooming, bathing/shower, toilet hygiene, dressing, socialization, health maintenance, functional mobility, community mobility, clothing management, social participation and transfers to common household surfaces  From OT standpoint, recommend home w/ family support upon D/C  OT will continue to follow pt 3-5x/wk to address the following goals to  w/in 7-10 days:   Goals   Patient Goals to get better   LTG Time Frame 7-10   Long Term Goal Please refer to LTGs listed below   Plan   Treatment Interventions ADL retraining;Functional transfer training; Endurance training;Patient/family training;Equipment evaluation/education; Compensatory technique education;Continued evaluation; Energy conservation; Activityengagement   Goal Expiration Date 18   Treatment Day 0   OT Frequency 3-5x/wk   Recommendation   OT Discharge Recommendation Home with family support   OT - OK to Discharge Yes  (when medically cleared)   Barthel Index   Feeding 10   Bathing 0   Grooming Score 5   Dressing Score 5   Bladder Score 10   Bowels Score 10   Toilet Use Score 5   Transfers (Bed/Chair) Score 10   Mobility (Level Surface) Score 0   Stairs Score 0   Barthel Index Score 55   Modified Vivienne Scale   Modified [place] Scale 4      GOALS    1) Pt will improve activity tolerance to G for min 30 min txment sessions    2) Pt will complete UB/LB dressing/self care w/ mod I using adaptive device and DME as needed    3) Pt will complete bathing w/ Mod I w/ use of AE and DME as needed    4) Pt will complete toileting w/ mod I w/ G hygiene/thoroughness using DME as needed    5) Pt will improve functional transfers to Mod I on/off all surfaces using DME as needed w/ G balance/safety     6) Pt will improve functional mobility during ADL/IADL/leisure tasks to Mod I using DME as needed w/ G balance/safety     7) Pt will participate in simulated IADL management task to increase independence to Mod I w/ G safety and endurance    8) Pt will engage in ongoing cognitive assessment w/ G participation w/ mod I to assist w/ safe d/c planning/recommendations    9) Pt will demonstrate G carryover of pt/caregiver education and training as appropriate w/ mod I w/o cues w/ good tolerance    10) Pt will demonstrate 100% carryover of energy conservation techniques w/ mod I t/o functional I/ADL/leisure tasks w/o cues s/p skilled education       Jihan Polk, OTR/L

## 2018-05-11 NOTE — PLAN OF CARE
Problem: OCCUPATIONAL THERAPY ADULT  Goal: Performs self-care activities at highest level of function for planned discharge setting  See evaluation for individualized goals  Treatment Interventions: ADL retraining, Functional transfer training, Endurance training, Patient/family training, Equipment evaluation/education, Compensatory technique education, Continued evaluation, Energy conservation, Activityengagement          See flowsheet documentation for full assessment, interventions and recommendations  Limitation: Decreased ADL status, Decreased endurance, Decreased self-care trans, Decreased high-level ADLs  Prognosis: Good  Assessment: Pt is a 34 y o  male seen for OT evaluation s/p adm to [address] [place] [address] on 5/9/2018 w/ Traumatic rhabdomyolysis 2* fall at home and landing on L knee/stump  Comorbidities affecting pts functional performance include a significant PMH of DM, disease of thyroid gland, HTN, neuropathy, and hx of L BKA  Pt with active OT orders and activity orders for Activity as tolerated  Pt is on vacation from [facility] visiting his dtr  He is staying w/ his dtr in a one level apt w/ 1 [address] and 12 stairs to 2nd floor apt  Pt reports dtr is able to assist as needed, however pt (+) home alone while dtr at work  At baseline, pt was I w/ ADLs and functional mobility/transfers w/ use of crutches, required assist w/ IADLs, (+) , and reports 1 fall PTA leading to admission  Upon evaluation, pt currently requires Min A for LB ADLs, Supervision for toileting, Mod I for UB ADLs, Supervision for transfers, and Min A for functional mobility 2* the following deficits impacting occupational performance: weakness, decreased strength, decreased balance, decreased tolerance and increased pain   These impairments, as well at pts steps to enter environment and difficulty performing ADLS limit pts ability to safely engage in all baseline areas of occupation, including grooming, bathing, dressing, toileting, functional mobility/transfers, community mobility, social participation  and leisure activities   Pt scored overall 55/100 on the Barthel Index  Based on the aforementioned OT evaluation, functional performance deficits, and assessments, pt has been identified as a moderate complexity evaluation  Pt to continue to benefit from continued acute OT services during hospital stay to address defined deficits and to maximize level of functional independence in the following Occupational Performance areas: grooming, bathing/shower, toilet hygiene, dressing, socialization, health maintenance, functional mobility, community mobility, clothing management, social participation and transfers to common household surfaces  From OT standpoint, recommend home w/ family support upon D/C   OT will continue to follow pt 3-5x/wk to address the following goals to  w/in 7-10 days:     OT Discharge Recommendation: Home with family support  OT - OK to Discharge: Yes (when medically cleared)

## 2018-05-11 NOTE — CONSULTS
Progress Note - Wound   Damian Lopez 34 y o  male MRN: [id number]  Unit/Bed#: [id number] Encounter: [id number]      Assessment:   Patient seen per physician consult  Patient reports having muscular pain to left leg--controlled with pain medication at this time  Patient is able to reposition himself in bed and is aware of the importance of shifting side to side while in bed to relieve pressure to buttocks and sacrum  Denies incontinence  Patient normally ambulates with left lower extremity prosthesis  However, left leg is swollen at this time  So patient is ambulating with walker  Skin folds, buttocks, sacrum, and right heel are intact  Patient has multiple skin tears which he states are related to a recent fall  However, the periwound areas of these wounds appear thick, callused and chronic  Findings:  1  Present on admission full thickness skin tears (type 3) to bilateral elbows and left knee  2   Present on admission diabetic ulcer of right second finger--wound appears chronic with thick, callused periwound  3   Resolving contact dermatitis to chest in areas of previously placed telemetry electrodes--skin intact, mildly erythematous in shape of electrodes over b/l lateral chest and epigastric areas  Plan:   1  Sofcare cushion when getting OOB to chair  2   Moisturize skin daily with nourishing lotion  3   Apply Hydraguard lotion to right heel and callused areas on hands BID & PRN  4   Cleanse b/l elbow and left knee wounds with soap and water, pat dry  Apply 3M no sting skin barrier film to periwound  Apply bacitracin to wound bed  Cover with telfa and secure with tegaderm BID   5   Elevate right heel off of bed on pillows  6   Wound care team to follow  Patient did not want a dressing placed to his right second finger wound  3M no sting barrier film applied  Encouraged patient to keep area clean and cover    Patient also requesting that dressing applied to elbows and left knee be gauze and tegaderm--states pink foam dressings do not stay intact, and does not want arms wrapped with amaris gauze  Discussed plan of care with Dr Tavarez Sessions Dr Lon Drake, patient has been referred to the wound center on previous admissions and did not go  No referral this admission  Patient returning to [facility] 5/[facility]/18--states he will follow-up with care upon return  Objective:    Vitals: Blood pressure 148/83, pulse 78, temperature 98 7 °F (37 1 °C), temperature source Temporal, resp  rate 20, weight 103 kg (227 lb), SpO2 98 %  ,Body mass index is 41 52 kg/m²  Wound 05/05/18 Other (Comment) Elbow Right Pt states' his skin is so dry that it always cracked  (Active)   Wound Description Clean;Pink 5/11/2018 10:30 AM   Maria-wound Assessment Dry 5/11/2018 10:30 AM   Shape Round 5/11/2018 10:30 AM   Wound Length (cm) 2 4 cm 5/11/2018 10:30 AM   Wound Width (cm) 2 cm 5/11/2018 10:30 AM   Wound Depth (cm) 0 2 5/11/2018 10:30 AM   Calculated Wound Volume (cm^3) 0 96 cm^3 5/11/2018 10:30 AM   Drainage Amount None 5/11/2018 10:30 AM   Drainage Description Serosanguineous 5/10/2018  9:00 AM   Non-staged Wound Description Full thickness 5/11/2018 10:30 AM   Treatments Cleansed;Irrigation with NSS;Site care 5/11/2018 10:30 AM   Dressing Gauze; Other (Comment) 5/11/2018 10:30 AM   Wound packed? No 5/9/2018  8:55 PM   Dressing Changed New 5/11/2018 10:30 AM   Patient Tolerance Tolerated well 5/11/2018 10:30 AM   Dressing Status Clean;Dry; Intact 5/11/2018 10:30 AM       Wound 05/05/18 Other (Comment) Elbow Left pt states he has cracked skin (Active)   Wound Description Clean;Pink 5/11/2018 10:30 AM   Maria-wound Assessment Dry 5/11/2018 10:30 AM   Wound Length (cm) 1 7 cm 5/11/2018 10:30 AM   Wound Width (cm) 1 5 cm 5/11/2018 10:30 AM   Wound Depth (cm) 0 2 5/11/2018 10:30 AM   Calculated Wound Volume (cm^3) 0 51 cm^3 5/11/2018 10:30 AM   Drainage Amount Scant 5/11/2018 10:30 AM   Drainage Description Serosanguineous 5/11/2018 10:30 AM   Non-staged Wound Description Full thickness 5/11/2018 10:30 AM   Treatments Cleansed;Site care;Irrigation with NSS 5/11/2018 10:30 AM   Dressing Other (Comment) 5/11/2018 10:30 AM   Wound packed? No 5/9/2018  8:55 PM   Dressing Changed New 5/11/2018 10:30 AM   Patient Tolerance Tolerated well 5/11/2018 10:30 AM   Dressing Status Clean;Dry; Intact 5/11/2018 10:30 AM       Wound 05/05/18 Other (Comment) Finger (Comment which one) Right (Active)   Wound Description Beefy red 5/11/2018 10:30 AM   Maria-wound Assessment Dry 5/11/2018 10:30 AM   Wound Length (cm) 0 6 cm 5/11/2018 10:30 AM   Wound Width (cm) 0 4 cm 5/11/2018 10:30 AM   Wound Depth (cm) 0 2 5/11/2018 10:30 AM   Calculated Wound Volume (cm^3) 0 05 cm^3 5/11/2018 10:30 AM   Drainage Amount None 5/11/2018 10:30 AM   Non-staged Wound Description Full thickness 5/11/2018 10:30 AM   Treatments Cleansed;Irrigation with NSS;Site care 5/11/2018 10:30 AM   Dressing Other (Comment) 5/11/2018 10:30 AM   Dressing Changed Other (Comment) 5/11/2018 10:30 AM   Patient Tolerance Tolerated well 5/11/2018 10:30 AM   Dressing Status Clean;Dry; Intact 5/11/2018 10:30 AM       Wound 05/05/18 Hand Left open cracked skin (Active)   Wound Description Clean;Dry; Intact 5/11/2018 10:30 AM   Maria-wound Assessment Dry 5/11/2018 10:30 AM   Drainage Amount None 5/11/2018 10:30 AM   Drainage Description Sanguineous 5/9/2018  7:15 PM   Non-staged Wound Description Not applicable 0/12/2495 67:05 AM   Dressing Open to air 5/11/2018 10:30 AM       Wound 05/05/18 Other (Comment) Knee Left cracked open area (Active)   Wound Description Clean;Beefy red;Yellow 5/11/2018 10:30 AM   Maria-wound Assessment Dry 5/11/2018 10:30 AM   Wound Length (cm) 2 5 cm 5/11/2018 10:30 AM   Wound Width (cm) 2 5 cm 5/11/2018 10:30 AM   Wound Depth (cm) 0 1 5/11/2018 10:30 AM   Calculated Wound Volume (cm^3) 0 62 cm^3 5/11/2018 10:30 AM   Drainage Amount Scant 5/11/2018 10:30 AM Drainage Description Serosanguineous 5/11/2018 10:30 AM   Non-staged Wound Description Full thickness 5/11/2018 10:30 AM   Treatments Cleansed;Irrigation with NSS;Site care 5/11/2018 10:30 AM   Dressing Other (Comment) 5/11/2018 10:30 AM   Wound packed? No 5/9/2018  8:55 PM   Dressing Changed New 5/11/2018 10:30 AM   Patient Tolerance Tolerated well 5/11/2018 10:30 AM   Dressing Status Clean;Dry; Intact 5/11/2018 10:30 AM     Padmaja Mathew BSN, RN, CCRN ()

## 2018-05-11 NOTE — DISCHARGE INSTR - OTHER ORDERS
Wound Care Instructions:  Cleanse wounds with soap and water, pat dry  Apply lotion to intact skin around wound  Apply bacitracin to wound bed  Cover with non-stick dressing  Change dressing daily

## 2018-05-12 LAB
ANION GAP SERPL CALCULATED.3IONS-SCNC: 8 MMOL/L (ref 4–13)
BASOPHILS # BLD AUTO: 0.06 THOUSANDS/ΜL (ref 0–0.1)
BASOPHILS NFR BLD AUTO: 1 % (ref 0–1)
BUN SERPL-MCNC: 47 MG/DL (ref 5–25)
CALCIUM SERPL-MCNC: 8.7 MG/DL (ref 8.3–10.1)
CHLORIDE SERPL-SCNC: 97 MMOL/L (ref 100–108)
CK MB SERPL-MCNC: 1.3 % (ref 0–2.5)
CK MB SERPL-MCNC: 9.7 NG/ML (ref 0–5)
CK SERPL-CCNC: 759 U/L (ref 39–308)
CO2 SERPL-SCNC: 29 MMOL/L (ref 21–32)
CREAT SERPL-MCNC: 1.96 MG/DL (ref 0.6–1.3)
EOSINOPHIL # BLD AUTO: 0.18 THOUSAND/ΜL (ref 0–0.61)
EOSINOPHIL NFR BLD AUTO: 2 % (ref 0–6)
ERYTHROCYTE [DISTWIDTH] IN BLOOD BY AUTOMATED COUNT: 15 % (ref 11.6–15.1)
GFR SERPL CREATININE-BSD FRML MDRD: 48 ML/MIN/1.73SQ M
GLUCOSE SERPL-MCNC: 105 MG/DL (ref 65–140)
GLUCOSE SERPL-MCNC: 115 MG/DL (ref 65–140)
GLUCOSE SERPL-MCNC: 128 MG/DL (ref 65–140)
GLUCOSE SERPL-MCNC: 136 MG/DL (ref 65–140)
GLUCOSE SERPL-MCNC: 165 MG/DL (ref 65–140)
HCT VFR BLD AUTO: 25.6 % (ref 36.5–49.3)
HGB BLD-MCNC: 8.2 G/DL (ref 12–17)
LYMPHOCYTES # BLD AUTO: 2.18 THOUSANDS/ΜL (ref 0.6–4.47)
LYMPHOCYTES NFR BLD AUTO: 20 % (ref 14–44)
MCH RBC QN AUTO: 28.5 PG (ref 26.8–34.3)
MCHC RBC AUTO-ENTMCNC: 32 G/DL (ref 31.4–37.4)
MCV RBC AUTO: 89 FL (ref 82–98)
MONOCYTES # BLD AUTO: 0.75 THOUSAND/ΜL (ref 0.17–1.22)
MONOCYTES NFR BLD AUTO: 7 % (ref 4–12)
NEUTROPHILS # BLD AUTO: 7.77 THOUSANDS/ΜL (ref 1.85–7.62)
NEUTS SEG NFR BLD AUTO: 70 % (ref 43–75)
NRBC BLD AUTO-RTO: 0 /100 WBCS
NT-PROBNP SERPL-MCNC: 2521 PG/ML
PLATELET # BLD AUTO: 576 THOUSANDS/UL (ref 149–390)
PMV BLD AUTO: 9.5 FL (ref 8.9–12.7)
POTASSIUM SERPL-SCNC: 4 MMOL/L (ref 3.5–5.3)
RBC # BLD AUTO: 2.88 MILLION/UL (ref 3.88–5.62)
SODIUM SERPL-SCNC: 134 MMOL/L (ref 136–145)
WBC # BLD AUTO: 10.94 THOUSAND/UL (ref 4.31–10.16)

## 2018-05-12 PROCEDURE — 80048 BASIC METABOLIC PNL TOTAL CA: CPT | Performed by: INTERNAL MEDICINE

## 2018-05-12 PROCEDURE — 84166 PROTEIN E-PHORESIS/URINE/CSF: CPT | Performed by: PATHOLOGY

## 2018-05-12 PROCEDURE — 86334 IMMUNOFIX E-PHORESIS SERUM: CPT | Performed by: INTERNAL MEDICINE

## 2018-05-12 PROCEDURE — 86334 IMMUNOFIX E-PHORESIS SERUM: CPT | Performed by: PATHOLOGY

## 2018-05-12 PROCEDURE — 83880 ASSAY OF NATRIURETIC PEPTIDE: CPT | Performed by: INTERNAL MEDICINE

## 2018-05-12 PROCEDURE — 82948 REAGENT STRIP/BLOOD GLUCOSE: CPT

## 2018-05-12 PROCEDURE — 82550 ASSAY OF CK (CPK): CPT | Performed by: INTERNAL MEDICINE

## 2018-05-12 PROCEDURE — 82553 CREATINE MB FRACTION: CPT | Performed by: INTERNAL MEDICINE

## 2018-05-12 PROCEDURE — 99232 SBSQ HOSP IP/OBS MODERATE 35: CPT | Performed by: INTERNAL MEDICINE

## 2018-05-12 PROCEDURE — 84165 PROTEIN E-PHORESIS SERUM: CPT | Performed by: PATHOLOGY

## 2018-05-12 PROCEDURE — 85025 COMPLETE CBC W/AUTO DIFF WBC: CPT | Performed by: INTERNAL MEDICINE

## 2018-05-12 PROCEDURE — 84165 PROTEIN E-PHORESIS SERUM: CPT | Performed by: INTERNAL MEDICINE

## 2018-05-12 RX ORDER — OXYCODONE HYDROCHLORIDE AND ACETAMINOPHEN 5; 325 MG/1; MG/1
2 TABLET ORAL EVERY 4 HOURS PRN
Status: DISCONTINUED | OUTPATIENT
Start: 2018-05-12 | End: 2018-05-16 | Stop reason: HOSPADM

## 2018-05-12 RX ADMIN — HEPARIN SODIUM 5000 UNITS: 5000 INJECTION, SOLUTION INTRAVENOUS; SUBCUTANEOUS at 21:53

## 2018-05-12 RX ADMIN — ASPIRIN 81 MG 81 MG: 81 TABLET ORAL at 07:36

## 2018-05-12 RX ADMIN — HEPARIN SODIUM 5000 UNITS: 5000 INJECTION, SOLUTION INTRAVENOUS; SUBCUTANEOUS at 05:31

## 2018-05-12 RX ADMIN — FUROSEMIDE 80 MG: 10 INJECTION, SOLUTION INTRAMUSCULAR; INTRAVENOUS at 21:52

## 2018-05-12 RX ADMIN — BACITRACIN ZINC 1 LARGE APPLICATION: 500 OINTMENT TOPICAL at 17:45

## 2018-05-12 RX ADMIN — OXYCODONE HYDROCHLORIDE AND ACETAMINOPHEN 2 TABLET: 5; 325 TABLET ORAL at 20:01

## 2018-05-12 RX ADMIN — BACITRACIN ZINC 1 LARGE APPLICATION: 500 OINTMENT TOPICAL at 11:45

## 2018-05-12 RX ADMIN — CARVEDILOL 12.5 MG: 6.25 TABLET, FILM COATED ORAL at 07:36

## 2018-05-12 RX ADMIN — MORPHINE SULFATE 4 MG: 4 INJECTION, SOLUTION INTRAMUSCULAR; INTRAVENOUS at 12:52

## 2018-05-12 RX ADMIN — INSULIN GLARGINE 75 UNITS: 100 INJECTION, SOLUTION SUBCUTANEOUS at 21:52

## 2018-05-12 RX ADMIN — FUROSEMIDE 80 MG: 10 INJECTION, SOLUTION INTRAMUSCULAR; INTRAVENOUS at 15:02

## 2018-05-12 RX ADMIN — FUROSEMIDE 80 MG: 10 INJECTION, SOLUTION INTRAMUSCULAR; INTRAVENOUS at 05:31

## 2018-05-12 RX ADMIN — LEVOTHYROXINE SODIUM 25 MCG: 25 TABLET ORAL at 05:31

## 2018-05-12 RX ADMIN — INSULIN LISPRO 1 UNITS: 100 INJECTION, SOLUTION INTRAVENOUS; SUBCUTANEOUS at 17:46

## 2018-05-12 RX ADMIN — ATORVASTATIN CALCIUM 20 MG: 20 TABLET, FILM COATED ORAL at 17:45

## 2018-05-12 RX ADMIN — MORPHINE SULFATE 4 MG: 4 INJECTION, SOLUTION INTRAMUSCULAR; INTRAVENOUS at 07:35

## 2018-05-12 RX ADMIN — CHOLECALCIFEROL TAB 10 MCG (400 UNIT) 400 UNITS: 10 TAB at 07:36

## 2018-05-12 RX ADMIN — CYCLOBENZAPRINE HYDROCHLORIDE 10 MG: 10 TABLET, FILM COATED ORAL at 07:36

## 2018-05-12 RX ADMIN — HEPARIN SODIUM 5000 UNITS: 5000 INJECTION, SOLUTION INTRAVENOUS; SUBCUTANEOUS at 15:02

## 2018-05-12 RX ADMIN — CARVEDILOL 12.5 MG: 6.25 TABLET, FILM COATED ORAL at 17:43

## 2018-05-12 RX ADMIN — LIDOCAINE 1 PATCH: 50 PATCH CUTANEOUS at 11:55

## 2018-05-12 RX ADMIN — MORPHINE SULFATE 4 MG: 4 INJECTION, SOLUTION INTRAMUSCULAR; INTRAVENOUS at 02:31

## 2018-05-12 RX ADMIN — OXYCODONE HYDROCHLORIDE AND ACETAMINOPHEN 2 TABLET: 5; 325 TABLET ORAL at 15:02

## 2018-05-12 NOTE — PROGRESS NOTES
NEPHROLOGY PROGRESS NOTE   Sixto Celestin 34 y o  male MRN: [id number]  Unit/Bed#: [facility] 87 220-01 Encounter: [id number]  Reason for Consult: GARO/CKD    ASSESSMENT/PLAN:  1  Acute kidney injury:  Multifactorial likely with component of mild rhabdomyolysis, cardiorenal syndrome given increased weight and hyponatremia  -renal function improving with IV diuretic use  -creatinine 1 96 this morning (2 09)  -I/O:  Urinary output increased, -3 4 net balance, no weight documented  -avoid nephrotoxins  -avoid hypotension  -follow I/O , lab values five status    2  Chronic kidney disease III:  Likely secondary to diabetes  -baseline 1 2-1 3  -does not follow with Nephrology  -recommend follow-up post discharge    3  Nephrotic range proteinuria:  Likely secondary to diabetic nephropathy  -history includes diabetes greater than 20 years complicated by severe diabetic retinopathy with partial blindness  -SPEP/UPEP pending    4  Hyponatremia:  Multifactorial with component of volume overload and hyperglycemia  -sodium increased 134 with diuretics  -blood sugar 115 today    5  Diastolic heart failure: Grade 2 diastolic diastolic with EF 12%  -urine output improved with IV diuretics  -may need to consider Zaroxolyn, trend for now  -need to weigh patient daily with documentation    6  Hypertension:  ACE-inhibitor currently on hold  -BP acceptable at this time  -remains on Coreg 12 5 mg b i d   -may need adjustments or additions if blood pressure elevation being off Ace inhibitors    7    Acute on chronic anemia:  Likely of chronic disease  - Hemoglobin stable at 8  -transfuse p r n   -will check iron stores        SUBJECTIVE:  Patient seen and examined, denies chest pain or shortness of Breath, overall feeling better    OBJECTIVE:  Current Weight: Weight - Scale: 103 kg (227 lb)  Vitals:    05/11/18 1532 05/11/18 2245 05/11/18 2308 05/12/18 0737   BP:  162/91 132/76 148/86   BP Location:  Left arm Right arm Left arm   Pulse: 86  84   Resp:  20  20   Temp:  97 5 °F (36 4 °C)  97 6 °F (36 4 °C)   TempSrc:  Temporal  Temporal   SpO2:  98%  94%   Weight:       Height: 5' 2" (1 575 m)          Intake/Output Summary (Last 24 hours) at 05/12/18 1120  Last data filed at 05/12/18 1030   Gross per 24 hour   Intake              540 ml   Output             4075 ml   Net            -3535 ml     General:  No acute distress, laying flat  Skin:  Warm and dry, no rash  HEENT: MMM, sclera anicteric  Neck:  Supple, no JVD  Chest:  Essentially clear, no crackles or wheezes noted  Heart: RRR, no rub  Abdomen:  Soft, nontender, nondistended, positive bowel sounds  Extremities:  Left BKA, trace right lower extremity edema noted  Neuro:  Alert and awake  Psych:  Appropriate affect    Medications:    Current Facility-Administered Medications:     acetaminophen (TYLENOL) tablet 650 mg, 650 mg, Oral, Q6H PRN, Saintclair Curling, MD    albuterol inhalation solution 2 5 mg, 2 5 mg, Nebulization, Q4H PRN, Saintclair Curling, MD    aspirin chewable tablet 81 mg, 81 mg, Oral, Daily, Saintclair Curling, MD, 81 mg at 05/12/18 0736    atorvastatin (LIPITOR) tablet 20 mg, 20 mg, Oral, After Juno Doss MD, 20 mg at 05/11/18 1803    bacitracin topical ointment 1 large application, 1 large application, Topical, BID, Saintclair Curling, MD, 1 large application at 30/05/40 1803    carvedilol (COREG) tablet 12 5 mg, 12 5 mg, Oral, BID With Meals, Saintclair Curling, MD, 12 5 mg at 05/12/18 0736    cholecalciferol (VITAMIN D3) tablet 400 Units, 400 Units, Oral, Daily, Saintclair Curling, MD, 400 Units at 05/12/18 0736    cyclobenzaprine (FLEXERIL) tablet 10 mg, 10 mg, Oral, TID PRN, May Hart MD, 10 mg at 05/12/18 0736    furosemide (LASIX) injection 80 mg, 80 mg, Intravenous, Q8H, Tresa Kidd DO, 80 mg at 05/12/18 0531    heparin (porcine) subcutaneous injection 5,000 Units, 5,000 Units, Subcutaneous, Q8H [address], 5,000 Units at 05/12/18 0531 **AND** Platelet count, , , Once, Nita Stone MD    insulin glargine (LANTUS) subcutaneous injection 75 Units 0 75 mL, 75 Units, Subcutaneous, HS, Nita Stone MD, 75 Units at 05/11/18 2126    insulin lispro (HumaLOG) 100 units/mL subcutaneous injection 1-6 Units, 1-6 Units, Subcutaneous, 4x Daily (AC & HS), 3 Units at 05/11/18 2127 **AND** Fingerstick Glucose (POCT), , , 4x Daily AC and at bedtime, Nita Stone MD    levothyroxine tablet 25 mcg, 25 mcg, Oral, Daily Before Breakfast, Nita Stone MD, 25 mcg at 05/12/18 0531    lidocaine (LIDODERM) 5 % patch 1 patch, 1 patch, Transdermal, Daily, Arlyne Schaumann, MD, 1 patch at 05/11/18 0937    morphine (PF) 4 mg/mL injection 4 mg, 4 mg, Intravenous, Q4H PRN, Arlyne Schaumann, MD, 4 mg at 05/12/18 0735    ondansetron (ZOFRAN) injection 4 mg, 4 mg, Intravenous, Q4H PRN, Nita Stone MD, 4 mg at 05/10/18 2201    Laboratory Results:    Results from last 7 days  Lab Units 05/12/18  0535 05/11/18  0447 05/10/18  0603 05/09/18  1449 05/08/18  0450 05/07/18  0505 05/07/18  0454 05/06/18  0700   WBC Thousand/uL 10 94* 10 89* 12 95* 12 41* 11 16*  --   --  10 68*   HEMOGLOBIN g/dL 8 2* 8 0* 7 8* 9 0* 8 7*  --   --  9 6*   HEMATOCRIT % 25 6* 24 8* 24 4* 27 4* 27 4*  --   --  30 3*   PLATELETS Thousands/uL 576* 523* 469*  436* 494* 425* 424*  --  403*   SODIUM mmol/L 134* 130* 132* 135* 135*  --  135* 135*   POTASSIUM mmol/L 4 0 4 2 4 2 4 7 3 7  --  3 4* 3 5   CHLORIDE mmol/L 97* 94* 97* 98* 100  --  100 100   CO2 mmol/L 29 27 28 28 29  --  29 31   BUN mg/dL 47* 47* 36* 36* 34*  --  32* 26*   CREATININE mg/dL 1 96* 2 09* 1 68* 1 49* 1 34*  --  1 26 1 29   CALCIUM mg/dL 8 7 8 3 8 3 8 4 8 7  --  8 5 8 6   MAGNESIUM mg/dL  --   --  2 0  --   --   --   --  2 0   PHOSPHORUS mg/dL  --   --  5 0*  --   --   --   --   --    TOTAL PROTEIN g/dL  --   --   --   --   --   --   --  7 5   GLUCOSE RANDOM mg/dL 115 231* 192* 128 111  --  140 209*

## 2018-05-12 NOTE — PROGRESS NOTES
Progress Note - Kaylah Lucio 34 y o  male MRN: [id number]    Unit/Bed#: [facility] 68 2 -01 Encounter: [id number]    Assessment/Plan:    Rhabdomyolysis  status post fall, resolving continue to follow CK, 759 today    Fall    patient is status BKA, continue with prosthesis and roller walker    Diabetes   restrict carbohydrate, with Lantus and sliding scale    A KI    CKD 3 with baseline creatinine 1 3, creatinine 1 96 today, possible component of mild rhabdomyolysis, renal consult reviewed, continue to monitor with Nephrology    Diastolic heart failure  appears compensated    Anemia   appears chronic, iron levels pending continue to monitor with Nephrology     Left hip pain   reviewed CT subcutaneous edema possibly related to traumatic injury, will provide warm compresses continue narcotic pain control  Patient reports that adverse reactions to gabapentin    Morbid obesity  would benefit from weight loss     Subjective:   Still complains of right thigh anterior and lateral pain, denies chest pain shortness of breath nausea vomiting diarrhea no fevers chills appetite is okay    Objective:     Vitals: Blood pressure 148/86, pulse 84, temperature 97 6 °F (36 4 °C), temperature source Temporal, resp  rate 20, height 5' 2" (1 575 m), weight 101 kg (222 lb 3 6 oz), SpO2 94 %  ,Body mass index is 40 65 kg/m²  Results from last 7 days  Lab Units 05/12/18  0535  05/09/18  1449   WBC Thousand/uL 10 94*  < > 12 41*   HEMOGLOBIN g/dL 8 2*  < > 9 0*   HEMATOCRIT % 25 6*  < > 27 4*   PLATELETS Thousands/uL 576*  < > 494*   INR   --   --  1 02   < > = values in this interval not displayed      Results from last 7 days  Lab Units 05/12/18  0535  05/06/18  0700   SODIUM mmol/L 134*  < > 135*   POTASSIUM mmol/L 4 0  < > 3 5   CHLORIDE mmol/L 97*  < > 100   CO2 mmol/L 29  < > 31   BUN mg/dL 47*  < > 26*   CREATININE mg/dL 1 96*  < > 1 29   CALCIUM mg/dL 8 7  < > 8 6   TOTAL PROTEIN g/dL  --   --  7 5   BILIRUBIN TOTAL mg/dL  -- --  0 30   ALK PHOS U/L  --   --  275*   ALT U/L  --   --  33   AST U/L  --   --  34   GLUCOSE RANDOM mg/dL 115  < > 209*   < > = values in this interval not displayed  Scheduled Meds:    Current Facility-Administered Medications:  acetaminophen 650 mg Oral Q6H PRN Anthony Padilla MD   albuterol 2 5 mg Nebulization Q4H PRN Anthony Padilla MD   aspirin 81 mg Oral Daily Anthony Padilla MD   atorvastatin 20 mg Oral After True Second, MD   bacitracin 1 large application Topical BID Anthony Padilla MD   carvedilol 12 5 mg Oral BID With Meals Anthony Padilla MD   cholecalciferol 400 Units Oral Daily Anthony Padilla MD   cyclobenzaprine 10 mg Oral TID PRN Charles Unger MD   furosemide 80 mg Intravenous Q8H Devi Kidd DO   heparin (porcine) 5,000 Units Subcutaneous Q8H [facility] Anthony Padilla MD   insulin glargine 75 Units Subcutaneous HS Anthony Padilla MD   insulin lispro 1-6 Units Subcutaneous 4x Daily (AC & HS) Anthony Padilla MD   levothyroxine 25 mcg Oral Daily Before Breakfast Anthony Padilla MD   lidocaine 1 patch Transdermal Daily Charles Unger MD   ondansetron 4 mg Intravenous Q4H PRN Anthony Padilla MD   oxyCODONE-acetaminophen 2 tablet Oral Q4H PRN Anna Hoyos DO       Continuous Infusions:     Physical exam:  General appearance:  Alert no distress interaction appropriate  Head/Eyes:  Nonicteric pale PERRL EOMI  Neck:  Supple  Lungs: CTA bilateral no wheezing rhonchi or rales  Heart: normal S1 S2 regular  Abdomen:  Obese nontender with bowel sounds  Extremities: no edema right BKA, right thigh induration/tender anterior lateral no ecchymosis  Skin: no rash    Invasive Devices     Peripheral Intravenous Line            Peripheral IV 05/09/18 Left Forearm 2 days                  VTE Pharmacologic Prophylaxis:  heparin   VTE Mechanical Prophylaxis:  SCD                     Counseling / Coordination of Care  Total floor / unit time spent today 30   minutes    Greater than 50% of total time was spent with the patient and / or family counseling and / or coordination of care    A description of the counseling / coordination of care:

## 2018-05-13 LAB
ANION GAP SERPL CALCULATED.3IONS-SCNC: 8 MMOL/L (ref 4–13)
BASOPHILS # BLD AUTO: 0.03 THOUSANDS/ΜL (ref 0–0.1)
BASOPHILS NFR BLD AUTO: 0 % (ref 0–1)
BUN SERPL-MCNC: 44 MG/DL (ref 5–25)
CALCIUM SERPL-MCNC: 8.8 MG/DL (ref 8.3–10.1)
CHLORIDE SERPL-SCNC: 96 MMOL/L (ref 100–108)
CO2 SERPL-SCNC: 31 MMOL/L (ref 21–32)
CREAT SERPL-MCNC: 1.79 MG/DL (ref 0.6–1.3)
EOSINOPHIL # BLD AUTO: 0.17 THOUSAND/ΜL (ref 0–0.61)
EOSINOPHIL NFR BLD AUTO: 1 % (ref 0–6)
ERYTHROCYTE [DISTWIDTH] IN BLOOD BY AUTOMATED COUNT: 15.2 % (ref 11.6–15.1)
FERRITIN SERPL-MCNC: 115 NG/ML (ref 8–388)
GFR SERPL CREATININE-BSD FRML MDRD: 54 ML/MIN/1.73SQ M
GLUCOSE SERPL-MCNC: 110 MG/DL (ref 65–140)
GLUCOSE SERPL-MCNC: 143 MG/DL (ref 65–140)
GLUCOSE SERPL-MCNC: 157 MG/DL (ref 65–140)
GLUCOSE SERPL-MCNC: 199 MG/DL (ref 65–140)
GLUCOSE SERPL-MCNC: 202 MG/DL (ref 65–140)
GLUCOSE SERPL-MCNC: 64 MG/DL (ref 65–140)
HCT VFR BLD AUTO: 27.5 % (ref 36.5–49.3)
HGB BLD-MCNC: 8.7 G/DL (ref 12–17)
IRON SATN MFR SERPL: 10 %
IRON SERPL-MCNC: 25 UG/DL (ref 65–175)
LYMPHOCYTES # BLD AUTO: 1.27 THOUSANDS/ΜL (ref 0.6–4.47)
LYMPHOCYTES NFR BLD AUTO: 10 % (ref 14–44)
MCH RBC QN AUTO: 28.5 PG (ref 26.8–34.3)
MCHC RBC AUTO-ENTMCNC: 31.6 G/DL (ref 31.4–37.4)
MCV RBC AUTO: 90 FL (ref 82–98)
MONOCYTES # BLD AUTO: 1.05 THOUSAND/ΜL (ref 0.17–1.22)
MONOCYTES NFR BLD AUTO: 8 % (ref 4–12)
NEUTROPHILS # BLD AUTO: 10.32 THOUSANDS/ΜL (ref 1.85–7.62)
NEUTS SEG NFR BLD AUTO: 81 % (ref 43–75)
NRBC BLD AUTO-RTO: 0 /100 WBCS
PHOSPHATE SERPL-MCNC: 5.5 MG/DL (ref 2.7–4.5)
PLATELET # BLD AUTO: 568 THOUSANDS/UL (ref 149–390)
PMV BLD AUTO: 9.5 FL (ref 8.9–12.7)
POTASSIUM SERPL-SCNC: 3.8 MMOL/L (ref 3.5–5.3)
PTH-INTACT SERPL-MCNC: 72.3 PG/ML (ref 18.4–80.1)
RBC # BLD AUTO: 3.05 MILLION/UL (ref 3.88–5.62)
SODIUM SERPL-SCNC: 135 MMOL/L (ref 136–145)
TIBC SERPL-MCNC: 258 UG/DL (ref 250–450)
WBC # BLD AUTO: 12.84 THOUSAND/UL (ref 4.31–10.16)

## 2018-05-13 PROCEDURE — 82728 ASSAY OF FERRITIN: CPT | Performed by: NURSE PRACTITIONER

## 2018-05-13 PROCEDURE — 99232 SBSQ HOSP IP/OBS MODERATE 35: CPT | Performed by: INTERNAL MEDICINE

## 2018-05-13 PROCEDURE — 84100 ASSAY OF PHOSPHORUS: CPT | Performed by: NURSE PRACTITIONER

## 2018-05-13 PROCEDURE — 83550 IRON BINDING TEST: CPT | Performed by: NURSE PRACTITIONER

## 2018-05-13 PROCEDURE — 80048 BASIC METABOLIC PNL TOTAL CA: CPT | Performed by: NURSE PRACTITIONER

## 2018-05-13 PROCEDURE — 82948 REAGENT STRIP/BLOOD GLUCOSE: CPT

## 2018-05-13 PROCEDURE — 83970 ASSAY OF PARATHORMONE: CPT | Performed by: NURSE PRACTITIONER

## 2018-05-13 PROCEDURE — 85025 COMPLETE CBC W/AUTO DIFF WBC: CPT | Performed by: INTERNAL MEDICINE

## 2018-05-13 PROCEDURE — 83540 ASSAY OF IRON: CPT | Performed by: NURSE PRACTITIONER

## 2018-05-13 RX ORDER — FUROSEMIDE 10 MG/ML
80 INJECTION INTRAMUSCULAR; INTRAVENOUS
Status: DISCONTINUED | OUTPATIENT
Start: 2018-05-13 | End: 2018-05-15

## 2018-05-13 RX ORDER — DOCUSATE SODIUM 100 MG/1
100 CAPSULE, LIQUID FILLED ORAL DAILY PRN
Status: DISCONTINUED | OUTPATIENT
Start: 2018-05-13 | End: 2018-05-16 | Stop reason: HOSPADM

## 2018-05-13 RX ORDER — SEVELAMER HYDROCHLORIDE 800 MG/1
800 TABLET, FILM COATED ORAL
Status: DISCONTINUED | OUTPATIENT
Start: 2018-05-13 | End: 2018-05-13

## 2018-05-13 RX ORDER — FERROUS SULFATE 325(65) MG
325 TABLET ORAL
Status: DISCONTINUED | OUTPATIENT
Start: 2018-05-14 | End: 2018-05-16 | Stop reason: HOSPADM

## 2018-05-13 RX ADMIN — OXYCODONE HYDROCHLORIDE AND ACETAMINOPHEN 2 TABLET: 5; 325 TABLET ORAL at 20:10

## 2018-05-13 RX ADMIN — BACITRACIN ZINC 1 LARGE APPLICATION: 500 OINTMENT TOPICAL at 08:33

## 2018-05-13 RX ADMIN — HEPARIN SODIUM 5000 UNITS: 5000 INJECTION, SOLUTION INTRAVENOUS; SUBCUTANEOUS at 05:57

## 2018-05-13 RX ADMIN — INSULIN LISPRO 1 UNITS: 100 INJECTION, SOLUTION INTRAVENOUS; SUBCUTANEOUS at 11:50

## 2018-05-13 RX ADMIN — ATORVASTATIN CALCIUM 20 MG: 20 TABLET, FILM COATED ORAL at 15:41

## 2018-05-13 RX ADMIN — HEPARIN SODIUM 5000 UNITS: 5000 INJECTION, SOLUTION INTRAVENOUS; SUBCUTANEOUS at 14:00

## 2018-05-13 RX ADMIN — CARVEDILOL 12.5 MG: 6.25 TABLET, FILM COATED ORAL at 15:41

## 2018-05-13 RX ADMIN — ASPIRIN 81 MG 81 MG: 81 TABLET ORAL at 08:32

## 2018-05-13 RX ADMIN — INSULIN LISPRO 2 UNITS: 100 INJECTION, SOLUTION INTRAVENOUS; SUBCUTANEOUS at 22:35

## 2018-05-13 RX ADMIN — OXYCODONE HYDROCHLORIDE AND ACETAMINOPHEN 2 TABLET: 5; 325 TABLET ORAL at 11:49

## 2018-05-13 RX ADMIN — CYCLOBENZAPRINE HYDROCHLORIDE 10 MG: 10 TABLET, FILM COATED ORAL at 08:32

## 2018-05-13 RX ADMIN — OXYCODONE HYDROCHLORIDE AND ACETAMINOPHEN 2 TABLET: 5; 325 TABLET ORAL at 00:24

## 2018-05-13 RX ADMIN — LEVOTHYROXINE SODIUM 25 MCG: 25 TABLET ORAL at 06:08

## 2018-05-13 RX ADMIN — LIDOCAINE 1 PATCH: 50 PATCH CUTANEOUS at 08:33

## 2018-05-13 RX ADMIN — INSULIN LISPRO 2 UNITS: 100 INJECTION, SOLUTION INTRAVENOUS; SUBCUTANEOUS at 08:32

## 2018-05-13 RX ADMIN — HEPARIN SODIUM 5000 UNITS: 5000 INJECTION, SOLUTION INTRAVENOUS; SUBCUTANEOUS at 22:38

## 2018-05-13 RX ADMIN — OXYCODONE HYDROCHLORIDE AND ACETAMINOPHEN 2 TABLET: 5; 325 TABLET ORAL at 05:57

## 2018-05-13 RX ADMIN — INSULIN GLARGINE 75 UNITS: 100 INJECTION, SOLUTION SUBCUTANEOUS at 22:35

## 2018-05-13 RX ADMIN — CARVEDILOL 12.5 MG: 6.25 TABLET, FILM COATED ORAL at 08:32

## 2018-05-13 RX ADMIN — FUROSEMIDE 80 MG: 10 INJECTION, SOLUTION INTRAMUSCULAR; INTRAVENOUS at 06:07

## 2018-05-13 RX ADMIN — CHOLECALCIFEROL TAB 10 MCG (400 UNIT) 400 UNITS: 10 TAB at 08:32

## 2018-05-13 NOTE — PLAN OF CARE
DISCHARGE PLANNING     Discharge to home or other facility with appropriate resources Progressing        DISCHARGE PLANNING - CARE MANAGEMENT     Discharge to post-acute care or home with appropriate resources Progressing        INFECTION - ADULT     Absence or prevention of progression during hospitalization Progressing     Absence of fever/infection during neutropenic period Progressing        MUSCULOSKELETAL - ADULT     Maintain or return mobility to safest level of function Progressing     Maintain proper alignment of affected body part Progressing        PAIN - ADULT     Verbalizes/displays adequate comfort level or baseline comfort level Progressing        Potential for Falls     Patient will remain free of falls Progressing        Prexisting or High Potential for Compromised Skin Integrity     Skin integrity is maintained or improved Progressing        SAFETY ADULT     Maintain or return to baseline ADL function Progressing     Maintain or return mobility status to optimal level Progressing        SKIN/TISSUE INTEGRITY - ADULT     Skin integrity remains intact Progressing     Incision(s), wounds(s) or drain site(s) healing without S/S of infection Progressing     Oral mucous membranes remain intact Progressing

## 2018-05-13 NOTE — PROGRESS NOTES
NEPHROLOGY PROGRESS NOTE   Lisandro Xiao 34 y o  male MRN: [id number]  Unit/Bed#: [facility] 68 2 [facility] 87 220-01 Encounter: [id number]  Reason for Consult: GARO/CKD    ASSESSMENT/PLAN:  1  Acute kidney injury:  Multifactorial likely with component of mild rhabdomyolysis, cardiorenal syndrome, possible ATN as evidenced by urinalysis  -creatinine continues to improve with t i d  IV diuretic use; creatinine 1 79 (1 96, 2 09)  -will decrease to b i d  dosing today  -I/O:  Urinary output increased, -5 7net balance, weight 101 kg  -CPK slowly improved at759 (1277)-A M     -avoid nephrotoxins  -avoid hypotension  -follow I/O , lab values, volume status     2  Chronic kidney disease III:  Likely secondary to diabetes  -baseline 1 2-1 3  -does not follow with Nephrology  -recommend follow-up post discharge     3  Nephrotic range proteinuria:  Likely secondary to diabetic nephropathy  -history includes diabetes greater than 20 years complicated by severe diabetic retinopathy with partial blindness  -SPEP/UPEP pending     4  Hyponatremia:  Multifactorial with component of volume overload and hyperglycemia  -sodium improved that 135 with diuretics     5  Diastolic heart failure: Grade 2 diastolic diastolic with EF 18%  -urine output improved with IV diuretics  -may need to consider Zaroxolyn, trend for now  -need to weigh patient daily with documentation-weight 101kg     6  Hypertension:  ACE-inhibitor currently on hold  -BP acceptable at this time  -remains on Coreg 12 5 mg b i d   -may need adjustments or additions if blood pressure elevation being off Ace inhibitors     7   Acute on chronic anemia:  Likely of chronic disease  - Hemoglobin stable at 8  -transfuse p r n   -Iron stores-pending    8    Left thigh pain:  Per CT scan report; CT subcutaneous edema possibly related to traumatic injury  -currently on warm compresses and narcotic pain control  -NSAIDs not drug of choice secondary to renal disease-discussed with patient  -continue with supportive care       Disposition:  Renal function continues to improve with IV diuretics, will decrease to b i d  dosing for now    SUBJECTIVE:  Patient seen and examined  Complaining of left leg pain, unchanged with current medications, was hoping to use NSAID if possible to assist with inflammation of his left leg    No chest pain or shortness of Breath    OBJECTIVE:  Current Weight: Weight - Scale: 101 kg (222 lb 3 6 oz)  Vitals:    05/12/18 1457 05/12/18 2145 05/12/18 2255 05/13/18 0812   BP: 151/73 145/82 149/89 (!) 180/89   BP Location: Left arm Left arm Left arm Left arm   Pulse: 85  80 85   Resp: 20  20 18   Temp: (!) 97 4 °F (36 3 °C)  (!) 96 9 °F (36 1 °C) 97 9 °F (36 6 °C)   TempSrc: Temporal  Temporal Temporal   SpO2: 97%  94% 99%   Weight:       Height:           Intake/Output Summary (Last 24 hours) at 05/13/18 1043  Last data filed at 05/12/18 1825   Gross per 24 hour   Intake                0 ml   Output             1300 ml   Net            -1300 ml     General:  No acute distress, resting in bed, cooperative  Skin:  Warm and dry, no rash  HEENT:  MMM, sclera anicteric  Neck: No JVD, supple  Chest:  Essentially clear on auscultation, slightly decreased basis, no crackles or wheezes noted  Heart: RRR, no rub noted  Abdomen:  Soft, non-tender, non-istended+ Bs  Extremities:  Left BKA, bilateral trace edema  Neuro:  Alert and oriented  Psych:  Appropriate affect    Medications:    Current Facility-Administered Medications:     acetaminophen (TYLENOL) tablet 650 mg, 650 mg, Oral, Q6H PRN, Susanne Berg MD    aspirin chewable tablet 81 mg, 81 mg, Oral, Daily, Susanne Berg MD, 81 mg at 05/13/18 8499    atorvastatin (LIPITOR) tablet 20 mg, 20 mg, Oral, After Caridad Romberg, MD, 20 mg at 05/12/18 1745    bacitracin topical ointment 1 large application, 1 large application, Topical, BID, Susanne Berg MD, 1 large application at 51/00/74 0833    carvedilol (COREG) tablet 12 5 mg, 12 5 mg, Oral, BID With Meals, Rafaela Peres MD, 12 5 mg at 05/13/18 6154    cholecalciferol (VITAMIN D3) tablet 400 Units, 400 Units, Oral, Daily, Rafaela Peres MD, 400 Units at 05/13/18 0832    cyclobenzaprine (FLEXERIL) tablet 10 mg, 10 mg, Oral, TID PRN, Carmen Kent MD, 10 mg at 05/13/18 0832    furosemide (LASIX) injection 80 mg, 80 mg, Intravenous, Q8H, Lydia Kidd, DO, 80 mg at 05/13/18 0607    heparin (porcine) subcutaneous injection 5,000 Units, 5,000 Units, Subcutaneous, Q8H [address], 5,000 Units at 05/13/18 0557 **AND** Platelet count, , , Once, Rafaela Peres MD    insulin glargine (LANTUS) subcutaneous injection 75 Units 0 75 mL, 75 Units, Subcutaneous, HS, Rafaela Peres MD, 75 Units at 05/12/18 2152    insulin lispro (HumaLOG) 100 units/mL subcutaneous injection 1-6 Units, 1-6 Units, Subcutaneous, 4x Daily (AC & HS), 2 Units at 05/13/18 7319 **AND** Fingerstick Glucose (POCT), , , 4x Daily AC and at bedtime, Rafaela Peres MD    levothyroxine tablet 25 mcg, 25 mcg, Oral, Daily Before Breakfast, Rafaela Peres MD, 25 mcg at 05/13/18 0608    lidocaine (LIDODERM) 5 % patch 1 patch, 1 patch, Transdermal, Daily, Carmen Kent MD, 1 patch at 05/13/18 0833    ondansetron (ZOFRAN) injection 4 mg, 4 mg, Intravenous, Q4H PRN, Rafaela Peres MD, 4 mg at 05/10/18 2201    oxyCODONE-acetaminophen (PERCOCET) 5-325 mg per tablet 2 tablet, 2 tablet, Oral, Q4H PRN, Marycruz Plata DO, 2 tablet at 05/13/18 0557    Laboratory Results:    Results from last 7 days  Lab Units 05/13/18  0453 05/12/18  0535 05/11/18  0447 05/10/18  0603 05/09/18  1449 05/08/18  0450 05/07/18  0505 05/07/18  0454   WBC Thousand/uL 12 84* 10 94* 10 89* 12 95* 12 41* 11 16*  --   --    HEMOGLOBIN g/dL 8 7* 8 2* 8 0* 7 8* 9 0* 8 7*  --   --    HEMATOCRIT % 27 5* 25 6* 24 8* 24 4* 27 4* 27 4*  --   --    PLATELETS Thousands/uL 568* 576* 523* 469*  436* 494* 425* 424*  --    SODIUM mmol/L 135* 134* 130* 132* 135* 135*  -- 135*   POTASSIUM mmol/L 3 8 4 0 4 2 4 2 4 7 3 7  --  3 4*   CHLORIDE mmol/L 96* 97* 94* 97* 98* 100  --  100   CO2 mmol/L 31 29 27 28 28 29  --  29   BUN mg/dL 44* 47* 47* 36* 36* 34*  --  32*   CREATININE mg/dL 1 79* 1 96* 2 09* 1 68* 1 49* 1 34*  --  1 26   CALCIUM mg/dL 8 8 8 7 8 3 8 3 8 4 8 7  --  8 5   MAGNESIUM mg/dL  --   --   --  2 0  --   --   --   --    PHOSPHORUS mg/dL 5 5*  --   --  5 0*  --   --   --   --    GLUCOSE RANDOM mg/dL 143* 115 231* 192* 128 111  --  140

## 2018-05-13 NOTE — PLAN OF CARE
DISCHARGE PLANNING     Discharge to home or other facility with appropriate resources Progressing        INFECTION - ADULT     Absence or prevention of progression during hospitalization Progressing     Absence of fever/infection during neutropenic period Progressing        MUSCULOSKELETAL - ADULT     Maintain or return mobility to safest level of function Progressing     Maintain proper alignment of affected body part Progressing        PAIN - ADULT     Verbalizes/displays adequate comfort level or baseline comfort level Progressing        Potential for Falls     Patient will remain free of falls Progressing        Prexisting or High Potential for Compromised Skin Integrity     Skin integrity is maintained or improved Progressing        SAFETY ADULT     Maintain or return to baseline ADL function Progressing     Maintain or return mobility status to optimal level Progressing        SKIN/TISSUE INTEGRITY - ADULT     Skin integrity remains intact Progressing     Incision(s), wounds(s) or drain site(s) healing without S/S of infection Progressing     Oral mucous membranes remain intact Progressing

## 2018-05-13 NOTE — PROGRESS NOTES
Progress Note - Kaylah Lucio 34 y o  male MRN: [id number]    Unit/Bed#: [facility] 2 -01 Encounter: [id number]    Assessment/Plan:    Rhabdo myolysis  related to fall, resolving    Ambulatory dysfunction status post BKA, encourage use of a roller walker with all ambulation    A KI    multifactorial with mild rhabdo, cardiorenal, possible ATN, monitor with Nephrology, creatinine is improved to 1 79 today continue to avoid nephrotoxins and hypotension    CKD 3    baseline creatinine 1 3, continue to monitor with Nephrology creatinine 1 79 today    Severe left leg pain  appears related to traumatic injury and edema, continue warm compresses elevation and narcotic pain control    Diabetes   control with Lantus sliding scale and ADA    Diastolic heart failure  currently compensated, continue Coreg and Lasix    Hypothyroidism  continue Synthroid at 25 micrograms daily    Dyslipidemia   continue statin for LDL control    Subjective:   Left leg pain improving, denies chest pain shortness of breath nausea vomiting diarrhea no fevers chills appetite is stable    Objective:     Vitals: Blood pressure (!) 180/89, pulse 85, temperature 97 9 °F (36 6 °C), temperature source Temporal, resp  rate 18, height 5' 2" (1 575 m), weight 101 kg (222 lb 3 6 oz), SpO2 99 %  ,Body mass index is 40 65 kg/m²  Results from last 7 days  Lab Units 05/13/18  0453  05/09/18  1449   WBC Thousand/uL 12 84*  < > 12 41*   HEMOGLOBIN g/dL 8 7*  < > 9 0*   HEMATOCRIT % 27 5*  < > 27 4*   PLATELETS Thousands/uL 568*  < > 494*   INR   --   --  1 02   < > = values in this interval not displayed      Results from last 7 days  Lab Units 05/13/18  0453   SODIUM mmol/L 135*   POTASSIUM mmol/L 3 8   CHLORIDE mmol/L 96*   CO2 mmol/L 31   BUN mg/dL 44*   CREATININE mg/dL 1 79*   CALCIUM mg/dL 8 8   GLUCOSE RANDOM mg/dL 143*       Scheduled Meds:    Current Facility-Administered Medications:  acetaminophen 650 mg Oral Q6H PRN Bo Herrmann MD   aspirin 81 mg Oral Daily Shanell Dinero MD   atorvastatin 20 mg Oral After Brunilda Lamas MD   bacitracin 1 large application Topical BID Shanell Dinero MD   carvedilol 12 5 mg Oral BID With Meals Shanell Dinero MD   cholecalciferol 400 Units Oral Daily Shanell Dinero MD   cyclobenzaprine 10 mg Oral TID PRN Jessica Frank MD   furosemide 80 mg Intravenous BID (diuretic) KONSTANTIN Petty   heparin (porcine) 5,000 Units Subcutaneous Q8H [address] Shanell Dinero MD   insulin glargine 75 Units Subcutaneous HS Shanell Dinero MD   insulin lispro 1-6 Units Subcutaneous 4x Daily (AC & HS) Shanell Dinero MD   levothyroxine 25 mcg Oral Daily Before Breakfast Shanell Dinero MD   lidocaine 1 patch Transdermal Daily Jessica Frank MD   ondansetron 4 mg Intravenous Q4H PRN Shanell Dinero MD   oxyCODONE-acetaminophen 2 tablet Oral Q4H PRN Ginette Smith, DO       Continuous Infusions:    Physical exam:  General appearance:  Alert no distress interaction appropriate  Head/Eyes:  Nonicteric PERRL EOMI  Neck:  Supple  Lungs: CTA bilateral no wheezing rhonchi or rales  Heart: normal S1 S2 regular  Abdomen:  Obese nontender with bowel sounds  Extremities: no edema left BKA indurated area is anterior and lateral thigh  Skin: no rash    Invasive Devices     Peripheral Intravenous Line            Peripheral IV 05/09/18 Left Forearm 3 days                  VTE Pharmacologic Prophylaxis:  heparin   VTE Mechanical Prophylaxis:  heparin                     Counseling / Coordination of Care  Total floor / unit time spent today 30   minutes  Greater than 50% of total time was spent with the patient and / or family counseling and / or coordination of care    A description of the counseling / coordination of care:

## 2018-05-14 LAB
ALBUMIN SERPL ELPH-MCNC: 2.19 G/DL (ref 3.5–5)
ALBUMIN SERPL ELPH-MCNC: 30.9 % (ref 52–65)
ALBUMIN UR ELPH-MCNC: 53.1 %
ALPHA1 GLOB MFR UR ELPH: 10.2 %
ALPHA1 GLOB SERPL ELPH-MCNC: 0.59 G/DL (ref 0.1–0.4)
ALPHA1 GLOB SERPL ELPH-MCNC: 8.3 % (ref 2.5–5)
ALPHA2 GLOB MFR UR ELPH: 7.3 %
ALPHA2 GLOB SERPL ELPH-MCNC: 1.09 G/DL (ref 0.4–1.2)
ALPHA2 GLOB SERPL ELPH-MCNC: 15.4 % (ref 7–13)
ANION GAP SERPL CALCULATED.3IONS-SCNC: 7 MMOL/L (ref 4–13)
B-GLOBULIN MFR UR ELPH: 9 %
BETA GLOB ABNORMAL SERPL ELPH-MCNC: 0.49 G/DL (ref 0.4–0.8)
BETA1 GLOB SERPL ELPH-MCNC: 6.9 % (ref 5–13)
BETA2 GLOB SERPL ELPH-MCNC: 9 % (ref 2–8)
BETA2+GAMMA GLOB SERPL ELPH-MCNC: 0.64 G/DL (ref 0.2–0.5)
BUN SERPL-MCNC: 37 MG/DL (ref 5–25)
CALCIUM SERPL-MCNC: 9.3 MG/DL (ref 8.3–10.1)
CHLORIDE SERPL-SCNC: 96 MMOL/L (ref 100–108)
CK MB SERPL-MCNC: 5 NG/ML (ref 0–5)
CK MB SERPL-MCNC: <1 % (ref 0–2.5)
CK SERPL-CCNC: 767 U/L (ref 39–308)
CO2 SERPL-SCNC: 33 MMOL/L (ref 21–32)
CREAT SERPL-MCNC: 1.69 MG/DL (ref 0.6–1.3)
GAMMA GLOB ABNORMAL SERPL ELPH-MCNC: 2.09 G/DL (ref 0.5–1.6)
GAMMA GLOB MFR UR ELPH: 20.4 %
GAMMA GLOB SERPL ELPH-MCNC: 29.5 % (ref 12–22)
GFR SERPL CREATININE-BSD FRML MDRD: 57 ML/MIN/1.73SQ M
GLUCOSE SERPL-MCNC: 124 MG/DL (ref 65–140)
GLUCOSE SERPL-MCNC: 177 MG/DL (ref 65–140)
GLUCOSE SERPL-MCNC: 231 MG/DL (ref 65–140)
GLUCOSE SERPL-MCNC: 37 MG/DL (ref 65–140)
GLUCOSE SERPL-MCNC: 42 MG/DL (ref 65–140)
GLUCOSE SERPL-MCNC: 47 MG/DL (ref 65–140)
GLUCOSE SERPL-MCNC: 50 MG/DL (ref 65–140)
GLUCOSE SERPL-MCNC: 78 MG/DL (ref 65–140)
GLUCOSE SERPL-MCNC: 87 MG/DL (ref 65–140)
GLUCOSE SERPL-MCNC: 97 MG/DL (ref 65–140)
IGG/ALB SER: 0.45 {RATIO} (ref 1.1–1.8)
INTERPRETATION UR IFE-IMP: NORMAL
MAGNESIUM SERPL-MCNC: 2 MG/DL (ref 1.6–2.6)
POTASSIUM SERPL-SCNC: 3.4 MMOL/L (ref 3.5–5.3)
PROT PATTERN UR ELPH-IMP: ABNORMAL
PROT SERPL-MCNC: 7.1 G/DL (ref 6.4–8.2)
PROT UR-MCNC: 231 MG/DL
SODIUM SERPL-SCNC: 136 MMOL/L (ref 136–145)

## 2018-05-14 PROCEDURE — 80048 BASIC METABOLIC PNL TOTAL CA: CPT | Performed by: NURSE PRACTITIONER

## 2018-05-14 PROCEDURE — 83735 ASSAY OF MAGNESIUM: CPT | Performed by: NURSE PRACTITIONER

## 2018-05-14 PROCEDURE — 82948 REAGENT STRIP/BLOOD GLUCOSE: CPT

## 2018-05-14 PROCEDURE — 99232 SBSQ HOSP IP/OBS MODERATE 35: CPT | Performed by: INTERNAL MEDICINE

## 2018-05-14 PROCEDURE — 99233 SBSQ HOSP IP/OBS HIGH 50: CPT | Performed by: INTERNAL MEDICINE

## 2018-05-14 PROCEDURE — 82550 ASSAY OF CK (CPK): CPT | Performed by: INTERNAL MEDICINE

## 2018-05-14 PROCEDURE — 82553 CREATINE MB FRACTION: CPT | Performed by: INTERNAL MEDICINE

## 2018-05-14 RX ORDER — POTASSIUM CHLORIDE 20 MEQ/1
40 TABLET, EXTENDED RELEASE ORAL ONCE
Status: COMPLETED | OUTPATIENT
Start: 2018-05-14 | End: 2018-05-14

## 2018-05-14 RX ORDER — POTASSIUM CHLORIDE 14.9 MG/ML
20 INJECTION INTRAVENOUS
Status: COMPLETED | OUTPATIENT
Start: 2018-05-14 | End: 2018-05-14

## 2018-05-14 RX ORDER — INSULIN GLARGINE 100 [IU]/ML
35 INJECTION, SOLUTION SUBCUTANEOUS
Status: DISCONTINUED | OUTPATIENT
Start: 2018-05-14 | End: 2018-05-16 | Stop reason: HOSPADM

## 2018-05-14 RX ORDER — CARVEDILOL 25 MG/1
25 TABLET ORAL 2 TIMES DAILY WITH MEALS
Status: DISCONTINUED | OUTPATIENT
Start: 2018-05-14 | End: 2018-05-16 | Stop reason: HOSPADM

## 2018-05-14 RX ADMIN — POTASSIUM CHLORIDE 20 MEQ: 200 INJECTION, SOLUTION INTRAVENOUS at 17:34

## 2018-05-14 RX ADMIN — FERROUS SULFATE TAB 325 MG (65 MG ELEMENTAL FE) 325 MG: 325 (65 FE) TAB at 08:02

## 2018-05-14 RX ADMIN — OXYCODONE HYDROCHLORIDE AND ACETAMINOPHEN 2 TABLET: 5; 325 TABLET ORAL at 10:53

## 2018-05-14 RX ADMIN — HEPARIN SODIUM 5000 UNITS: 5000 INJECTION, SOLUTION INTRAVENOUS; SUBCUTANEOUS at 21:23

## 2018-05-14 RX ADMIN — CHOLECALCIFEROL TAB 10 MCG (400 UNIT) 400 UNITS: 10 TAB at 08:02

## 2018-05-14 RX ADMIN — CYCLOBENZAPRINE HYDROCHLORIDE 10 MG: 10 TABLET, FILM COATED ORAL at 08:02

## 2018-05-14 RX ADMIN — LIDOCAINE 1 PATCH: 50 PATCH CUTANEOUS at 08:02

## 2018-05-14 RX ADMIN — OXYCODONE HYDROCHLORIDE AND ACETAMINOPHEN 2 TABLET: 5; 325 TABLET ORAL at 01:16

## 2018-05-14 RX ADMIN — CARVEDILOL 12.5 MG: 6.25 TABLET, FILM COATED ORAL at 08:02

## 2018-05-14 RX ADMIN — OXYCODONE HYDROCHLORIDE AND ACETAMINOPHEN 2 TABLET: 5; 325 TABLET ORAL at 06:23

## 2018-05-14 RX ADMIN — LEVOTHYROXINE SODIUM 25 MCG: 25 TABLET ORAL at 06:24

## 2018-05-14 RX ADMIN — HEPARIN SODIUM 5000 UNITS: 5000 INJECTION, SOLUTION INTRAVENOUS; SUBCUTANEOUS at 14:13

## 2018-05-14 RX ADMIN — INSULIN GLARGINE 35 UNITS: 100 INJECTION, SOLUTION SUBCUTANEOUS at 21:23

## 2018-05-14 RX ADMIN — BACITRACIN ZINC 1 LARGE APPLICATION: 500 OINTMENT TOPICAL at 08:10

## 2018-05-14 RX ADMIN — POTASSIUM CHLORIDE 40 MEQ: 1500 TABLET, EXTENDED RELEASE ORAL at 14:13

## 2018-05-14 RX ADMIN — BACITRACIN ZINC 1 LARGE APPLICATION: 500 OINTMENT TOPICAL at 17:44

## 2018-05-14 RX ADMIN — INSULIN LISPRO 1 UNITS: 100 INJECTION, SOLUTION INTRAVENOUS; SUBCUTANEOUS at 21:25

## 2018-05-14 RX ADMIN — FUROSEMIDE 80 MG: 10 INJECTION, SOLUTION INTRAMUSCULAR; INTRAVENOUS at 15:44

## 2018-05-14 RX ADMIN — OXYCODONE HYDROCHLORIDE AND ACETAMINOPHEN 2 TABLET: 5; 325 TABLET ORAL at 19:45

## 2018-05-14 RX ADMIN — CARVEDILOL 25 MG: 25 TABLET, FILM COATED ORAL at 15:45

## 2018-05-14 RX ADMIN — FUROSEMIDE 80 MG: 10 INJECTION, SOLUTION INTRAMUSCULAR; INTRAVENOUS at 08:02

## 2018-05-14 RX ADMIN — HEPARIN SODIUM 5000 UNITS: 5000 INJECTION, SOLUTION INTRAVENOUS; SUBCUTANEOUS at 06:23

## 2018-05-14 RX ADMIN — OXYCODONE HYDROCHLORIDE AND ACETAMINOPHEN 2 TABLET: 5; 325 TABLET ORAL at 14:50

## 2018-05-14 RX ADMIN — INSULIN LISPRO 3 UNITS: 100 INJECTION, SOLUTION INTRAVENOUS; SUBCUTANEOUS at 11:29

## 2018-05-14 RX ADMIN — ASPIRIN 81 MG 81 MG: 81 TABLET ORAL at 08:02

## 2018-05-14 RX ADMIN — POTASSIUM CHLORIDE 20 MEQ: 200 INJECTION, SOLUTION INTRAVENOUS at 14:22

## 2018-05-14 NOTE — PROGRESS NOTES
NEPHROLOGY PROGRESS NOTE   [facility] 34 y o  male MRN: [id number]  Unit/Bed#: Manuelu Lisa Luite Eder 87 220-01 Encounter: [id number]      ASSESSMENT & PLAN:    1  Acute kidney injury on chronic kidney disease  -creatinine continues to improve with diuresis  -continue IV diuresis x1 more day then transition to oral tomorrow  -etiology thought to be secondary to mild rhabdomyolysis, cardiorenal syndrome and ATN  -SPEP and UPEP pending  -urine protein to creatinine ratio shows greater than 9 g of protein  -has been a diabetic for greater than 20 year    2  Stage 3 chronic kidney disease  -baseline creatinine is 1 3, with nephrotic syndrome patient has a high chance of worsening renal failure    3  Status post fall  -continue PT and OT  -CPK is trending down but now has stabilized around 700    4  Diastolic congestive heart failure  -will transition to oral diuretics tomorrow    5  Hypertension  -hold ACE-inhibitor, if blood pressures are still high can increase carvedilol    6  Hyponatremia  -improving now with diuresis in the setting of volume overload and hyperglycemia    7    CKD bone mineral bone disease  -can continue to monitor parameters as an outpatient    -patient will be leaving to go back to [facility], upon discharge will discuss with further recommendations that he will need, he will need a nephrologist to follow up with as an outpatient, will also need to follow-up on SPEP and UPEP and would benefit from a serologic workup for his proteinuria    SUBJECTIVE:    Patient seen states he is feeling better denies any chest pain shortness of breath today    OBJECTIVE:  Current Weight: Weight - Scale: 101 kg (222 lb 3 6 oz)  Vitals:    05/14/18 0700   BP: 154/80   Pulse: 88   Resp: 18   Temp: 97 7 °F (36 5 °C)   SpO2: 96%       Intake/Output Summary (Last 24 hours) at 05/14/18 1236  Last data filed at 05/14/18 1045   Gross per 24 hour   Intake                0 ml   Output             2450 ml   Net            -2450 ml General: conscious, cooperative, in not acute distress  Eyes: conjunctivae pink, anicteric sclerae  ENT: lips and mucous membranes moist  Neck: supple, no JVD  Chest: clear breath sounds bilateral, no crackles, ronchus or wheezings  CVS: distinct S1 & S2, normal rate, regular rhythm  Abdomen: soft, non-tender, non-distended, normoactive bowel sounds  Extremities:  Right BKA  Skin: no rash  Neuro: awake, alert, oriented        Medications:    Current Facility-Administered Medications:     acetaminophen (TYLENOL) tablet 650 mg, 650 mg, Oral, Q6H PRN, Robbin Lopez MD    aspirin chewable tablet 81 mg, 81 mg, Oral, Daily, Robbin Lopez MD, 81 mg at 05/14/18 0802    atorvastatin (LIPITOR) tablet 20 mg, 20 mg, Oral, After Magda Perez MD, 20 mg at 05/13/18 1541    bacitracin topical ointment 1 large application, 1 large application, Topical, BID, Robbin Lopez MD, 1 large application at 03/25/22 0810    carvedilol (COREG) tablet 12 5 mg, 12 5 mg, Oral, BID With Meals, Robbin Lopez MD, 12 5 mg at 05/14/18 0802    cholecalciferol (VITAMIN D3) tablet 400 Units, 400 Units, Oral, Daily, Robbin Lopez MD, 400 Units at 05/14/18 0802    cyclobenzaprine (FLEXERIL) tablet 10 mg, 10 mg, Oral, TID PRN, Alexus Chatterjee MD, 10 mg at 05/14/18 0802    docusate sodium (COLACE) capsule 100 mg, 100 mg, Oral, Daily PRN, Markell Brar MD    ferrous sulfate tablet 325 mg, 325 mg, Oral, Daily With Breakfast, Markell Brar MD, 325 mg at 05/14/18 0802    furosemide (LASIX) injection 80 mg, 80 mg, Intravenous, BID (diuretic), KONSTANTIN Ospina, 80 mg at 05/14/18 0802    heparin (porcine) subcutaneous injection 5,000 Units, 5,000 Units, Subcutaneous, Q8H [facility], 5,000 Units at 05/14/18 6004 **AND** Platelet count, , , Once, Robbin Lopez MD    insulin glargine (LANTUS) subcutaneous injection 75 Units 0 75 mL, 75 Units, Subcutaneous, , Robbin MD Jessica, 75 Units at 05/13/18 8747    insulin lispro (HumaLOG) 100 units/mL subcutaneous injection 1-6 Units, 1-6 Units, Subcutaneous, 4x Daily (AC & HS), 3 Units at 05/14/18 1129 **AND** Fingerstick Glucose (POCT), , , 4x Daily AC and at bedtime, Mee Villa MD    levothyroxine tablet 25 mcg, 25 mcg, Oral, Daily Before Breakfast, Mee Villa MD, 25 mcg at 05/14/18 0624    lidocaine (LIDODERM) 5 % patch 1 patch, 1 patch, Transdermal, Daily, Bettie Gerard MD, 1 patch at 05/14/18 0802    ondansetron (ZOFRAN) injection 4 mg, 4 mg, Intravenous, Q4H PRN, Mee Villa MD, 4 mg at 05/10/18 2201    oxyCODONE-acetaminophen (PERCOCET) 5-325 mg per tablet 2 tablet, 2 tablet, Oral, Q4H PRN, Achilles Sidle, DO, 2 tablet at 05/14/18 1053    Invasive Devices:      Lab Results:     Results from last 7 days  Lab Units 05/14/18  0509 05/13/18  0453 05/12/18  0535 05/11/18  0447 05/10/18  1050 05/10/18  0603 05/09/18  1449 05/08/18  0534   WBC Thousand/uL  --  12 84* 10 94* 10 89*  --  12 95* 12 41*  --    HEMOGLOBIN g/dL  --  8 7* 8 2* 8 0*  --  7 8* 9 0*  --    HEMATOCRIT %  --  27 5* 25 6* 24 8*  --  24 4* 27 4*  --    PLATELETS Thousands/uL  --  568* 576* 523*  --  469*  436* 494*  --    SODIUM mmol/L 136 135* 134* 130*  --  132* 135*  --    POTASSIUM mmol/L 3 4* 3 8 4 0 4 2  --  4 2 4 7  --    CHLORIDE mmol/L 96* 96* 97* 94*  --  97* 98*  --    CO2 mmol/L 33* 31 29 27  --  28 28  --    BUN mg/dL 37* 44* 47* 47*  --  36* 36*  --    CREATININE mg/dL 1 69* 1 79* 1 96* 2 09*  --  1 68* 1 49*  --    CALCIUM mg/dL 9 3 8 8 8 7 8 3  --  8 3 8 4  --    MAGNESIUM mg/dL 2 0  --   --   --   --  2 0  --   --    PHOSPHORUS mg/dL  --  5 5*  --   --   --  5 0*  --   --    GLUCOSE RANDOM mg/dL 47* 143* 115 231*  --  192* 128  --    PROTEIN UA mg/dl  --   --   --   --  >=300*  --   --  >=300*   LEUKOCYTES UA   --   --   --   --  Negative  --   --  Negative   BLOOD UA   --   --   --   --  Large*  --   --  Moderate*       Previous work up:  Please see previous notes

## 2018-05-14 NOTE — OCCUPATIONAL THERAPY NOTE
Occupational Therapy Cancellation Note:      Patient Name: Chrissie RANGEL Date: 5/14/2018    Attempted to see pt for OT treatment session this PM, however pt asked to defer OT treatment session at this time 2* increased throbbing sensation and pain in  L LE/stump  Therefore, pt cx'd this PM   OT to follow pt on caseload as able to A w/ safe d/c planning/recommendations       Samra Myers, OTR/L

## 2018-05-14 NOTE — PROGRESS NOTES
Progress Note - Nilesh November 34 y o  male MRN: [id number]    Unit/Bed#: [facility] 68 2 Luite Eder 87 220-01 Encounter: [id number]    Assessment/Plan:        Ambulatory dysfunction   status post BKA, encourage use of a roller walker with all ambulation, which she does at baseline currently not agreeable for short-term rehabilitation reporting "planning to move back to [facility] tomorrow by family is waiting for me "    Acute kidney injury     multifactorial with mild rhabdo, cardiorenal, possible ATN, monitor with Nephrology, creatinine is improved to 1 79 today continue to avoid nephrotoxins and hypotension,SPEP and UPEP pending ,nephrology plans to switch to by mouth Lasix tomorrow    CKD 3      baseline creatinine 1 3, continues to show improvement on IV diuresis  Continue for at least 24 hours today  Severe left leg pain    Currently improved and resolving, appears related to traumatic injury and edema, continue warm compresses elevation and narcotic pain control    DM Type II:    Extreme noncompliance with dietary and occasional medication regimen reported  Begin no concentrated carbohydrate diet  Cover with Aspart SSI as needed   Will order HgbA1C to assess status of recent glycemic control  Well initiate home medications once med rec is completed and confirmed by pharmacy  Diastolic heart failure    currently compensated, continue Coreg and Lasix    Hypothyroidism    Synthroid at 25 micrograms daily    Dyslipidemia   Statin on hold for now due to rhabdomyolysis      Subjective:   Left leg pain improving, denies chest pain shortness of breath nausea vomiting diarrhea no fevers chills appetite is stable    Objective:     Vitals: Blood pressure 154/80, pulse 88, temperature 97 7 °F (36 5 °C), temperature source Temporal, resp  rate 18, height 5' 2" (1 575 m), weight 101 kg (222 lb 3 6 oz), SpO2 96 %  ,Body mass index is 40 65 kg/m²      Physical exam:    General:  No acute distress, resting in bed, cooperative  Skin: Warm and dry, no rash  HEENT:  MMM, sclera anicteric  Neck: No JVD, supple  Chest:  Essentially clear on auscultation, slightly decreased basis, no crackles or wheezes noted  Heart: RRR, no rub noted  Abdomen:  Soft, non-tender, non-istended+ Bs  Extremities:  Left BKA, bilateral trace edema  Neuro:  Alert and oriented  Psych:  Appropriate mood and affect, denies any suicidal homicidal ideation denies any depression  t             Results from last 7 days  Lab Units 05/13/18  0453  05/09/18  1449   WBC Thousand/uL 12 84*  < > 12 41*   HEMOGLOBIN g/dL 8 7*  < > 9 0*   HEMATOCRIT % 27 5*  < > 27 4*   PLATELETS Thousands/uL 568*  < > 494*   INR   --   --  1 02   < > = values in this interval not displayed  Results from last 7 days  Lab Units 05/14/18  0509  05/12/18  0535   SODIUM mmol/L 136  < > 134*   POTASSIUM mmol/L 3 4*  < > 4 0   CHLORIDE mmol/L 96*  < > 97*   CO2 mmol/L 33*  < > 29   BUN mg/dL 37*  < > 47*   CREATININE mg/dL 1 69*  < > 1 96*   CALCIUM mg/dL 9 3  < > 8 7   TOTAL PROTEIN g/dL  --   --  7 1   GLUCOSE RANDOM mg/dL 47*  < > 115   < > = values in this interval not displayed      Scheduled Meds:    Current Facility-Administered Medications:  acetaminophen 650 mg Oral Q6H PRN Hilario Lefort, MD   aspirin 81 mg Oral Daily Hilario Lefort, MD   atorvastatin 20 mg Oral After Carlo Moran MD   bacitracin 1 large application Topical BID Hilario Lefort, MD   carvedilol 25 mg Oral BID With Meals Kimi Raphael MD   cholecalciferol 400 Units Oral Daily Hilario Lefort, MD   cyclobenzaprine 10 mg Oral TID PRN Francisca Coronado MD   docusate sodium 100 mg Oral Daily PRN Carmina Dc MD   ferrous sulfate 325 mg Oral Daily With Breakfast Carmina Dc MD   furosemide 80 mg Intravenous BID (diuretic) KONSTANTIN Reid   heparin (porcine) 5,000 Units Subcutaneous Q8H [address] Hilario Lefort, MD   insulin glargine 75 Units Subcutaneous HS Hilario Lefort, MD   insulin lispro 1-6 Units Subcutaneous 4x Daily [facility] & HS) Hilario Lefort, MD   levothyroxine 25 mcg Oral Daily Before Breakfast Hilario Lefort, MD   lidocaine 1 patch Transdermal Daily Francisca Coronado MD   ondansetron 4 mg Intravenous Q4H PRN Hilario Lefort, MD   oxyCODONE-acetaminophen 2 tablet Oral Q4H PRN Simone Laws DO       Continuous Infusions:    Physical exam:  General appearance:  Alert no distress interaction appropriate  Head/Eyes:  Nonicteric PERRL EOMI  Neck:  Supple  Lungs: CTA bilateral no wheezing rhonchi or rales  Heart: normal S1 S2 regular  Abdomen:  Obese nontender with bowel sounds  Extremities: no edema left BKA indurated area is anterior and lateral thigh  Skin: no rash    Invasive Devices     Peripheral Intravenous Line            Peripheral IV 05/09/18 Left Forearm 4 days                  VTE Pharmacologic Prophylaxis:  heparin   VTE Mechanical Prophylaxis:  heparin                     Counseling / Coordination of Care  Total floor / unit time spent today 30   minutes  Greater than 50% of total time was spent with the patient and / or family counseling and / or coordination of care    A description of the counseling / coordination of care:

## 2018-05-14 NOTE — PROGRESS NOTES
Patient c/o low BG  PT BG at 03:59 is 37  PT is given 4oz of apple juice and bong crackers  PT BG rechecked 04:33 is 42  PT given 4oz apple juice and bong crackers  PT BG has dropped two nights in a row  PT is receiving 75 units Lantus HS  PT is resting comfortably in bed  The call bell is within reach  Will continue to monitor  Will continue recheck BG

## 2018-05-14 NOTE — CASE MANAGEMENT
Continued Stay Review    Date:  5/13/2018    Vital Signs: /80 (BP Location: Left arm)   Pulse 88   Temp 97 7 °F (36 5 °C) (Temporal)   Resp 18   Ht 5' 2" (1 575 m)   Wt 101 kg (222 lb 3 6 oz)   SpO2 96%   BMI 40 65 kg/m²     Medications:   Scheduled Meds:   KDUR 40 po x 1 5/14  Lasix 80 IV q 8h  - decreased to bid  Current Facility-Administered Medications:          aspirin 81 mg Oral Daily Dominick Suazo MD    bacitracin 1 large application Topical BID Dominick Suazo MD    carvedilol 25 mg Oral BID With Meals Feli Wheat MD    cholecalciferol 400 Units Oral Daily Dominick Suazo MD                    ferrous sulfate 325 mg Oral Daily With Breakfast Sravani Villa MD    furosemide 80 mg Intravenous BID (diuretic) KONSTANTIN Preciado    heparin (porcine) 5,000 Units Subcutaneous Q8H [facility] Dominick Suazo MD    insulin glargine 75 Units Subcutaneous HS Dominick Suazo MD    insulin lispro 1-6 Units Subcutaneous 4x Daily (AC & HS) Dominick Suazo MD    levothyroxine 25 mcg Oral Daily Before Breakfast Dominick Suazo MD    lidocaine 1 patch Transdermal Daily Josephine Bernstein MD                    potassium chloride x 2 doses  On 5/14 20 mEq Intravenous Q2H Feli Wheat MD Last Rate: 20 mEq (05/14/18 1422)     Continuous Infusions:    PRN Meds:   acetaminophen    Cyclobenzaprine x 1    docusate sodium    ondansetron    oxyCODONE-acetaminophen x 4    Abnormal Labs/Diagnostic Results:  Wbc's 12 84,   H&H 8 7 / 27 5,   Plats 568,   ANC 10 32,   ,   Cl 96,   BUN 44,   Cr 1 79,  Glu 143,   Phos 5 5    Age/Sex: 34 y o  male   Left leg pain improving    Assessment/Plan:   Rhabdo myolysis                    related to fall, resolving     Ambulatory dysfunction          status post BKA, encourage use of a roller walker with all ambulation     A KI                                         multifactorial with mild rhabdo, cardiorenal, possible ATN, monitor with Nephrology, creatinine is improved to 1 79 today continue to avoid nephrotoxins and hypotension     CKD 3                                      baseline creatinine 1 3, continue to monitor with Nephrology creatinine 1 79 today     Severe left leg pain                 appears related to traumatic injury and edema, continue warm compresses elevation and narcotic pain control     Diabetes                                  control with Lantus sliding scale and ADA     Diastolic heart failure              currently compensated, continue Coreg and Lasix     Hypothyroidism                       continue Synthroid at 25 micrograms daily     Dyslipidemia                            continue statin for LDL control    Discharge Plan:  Anticipate home

## 2018-05-15 LAB
ANION GAP SERPL CALCULATED.3IONS-SCNC: 5 MMOL/L (ref 4–13)
BASOPHILS # BLD AUTO: 0.03 THOUSANDS/ΜL (ref 0–0.1)
BASOPHILS NFR BLD AUTO: 0 % (ref 0–1)
BUN SERPL-MCNC: 35 MG/DL (ref 5–25)
CALCIUM SERPL-MCNC: 8.7 MG/DL (ref 8.3–10.1)
CHLORIDE SERPL-SCNC: 96 MMOL/L (ref 100–108)
CO2 SERPL-SCNC: 31 MMOL/L (ref 21–32)
CREAT SERPL-MCNC: 1.77 MG/DL (ref 0.6–1.3)
EOSINOPHIL # BLD AUTO: 0.13 THOUSAND/ΜL (ref 0–0.61)
EOSINOPHIL NFR BLD AUTO: 1 % (ref 0–6)
ERYTHROCYTE [DISTWIDTH] IN BLOOD BY AUTOMATED COUNT: 15.2 % (ref 11.6–15.1)
GFR SERPL CREATININE-BSD FRML MDRD: 54 ML/MIN/1.73SQ M
GLUCOSE SERPL-MCNC: 117 MG/DL (ref 65–140)
GLUCOSE SERPL-MCNC: 119 MG/DL (ref 65–140)
GLUCOSE SERPL-MCNC: 144 MG/DL (ref 65–140)
GLUCOSE SERPL-MCNC: 155 MG/DL (ref 65–140)
GLUCOSE SERPL-MCNC: 251 MG/DL (ref 65–140)
HCT VFR BLD AUTO: 26.1 % (ref 36.5–49.3)
HGB BLD-MCNC: 8.2 G/DL (ref 12–17)
LYMPHOCYTES # BLD AUTO: 1.84 THOUSANDS/ΜL (ref 0.6–4.47)
LYMPHOCYTES NFR BLD AUTO: 12 % (ref 14–44)
MCH RBC QN AUTO: 28.3 PG (ref 26.8–34.3)
MCHC RBC AUTO-ENTMCNC: 31.4 G/DL (ref 31.4–37.4)
MCV RBC AUTO: 90 FL (ref 82–98)
MONOCYTES # BLD AUTO: 1.16 THOUSAND/ΜL (ref 0.17–1.22)
MONOCYTES NFR BLD AUTO: 7 % (ref 4–12)
NEUTROPHILS # BLD AUTO: 12.47 THOUSANDS/ΜL (ref 1.85–7.62)
NEUTS SEG NFR BLD AUTO: 80 % (ref 43–75)
NRBC BLD AUTO-RTO: 0 /100 WBCS
PLATELET # BLD AUTO: 588 THOUSANDS/UL (ref 149–390)
PMV BLD AUTO: 9.4 FL (ref 8.9–12.7)
POTASSIUM SERPL-SCNC: 4.2 MMOL/L (ref 3.5–5.3)
RBC # BLD AUTO: 2.9 MILLION/UL (ref 3.88–5.62)
SODIUM SERPL-SCNC: 132 MMOL/L (ref 136–145)
WBC # BLD AUTO: 15.63 THOUSAND/UL (ref 4.31–10.16)

## 2018-05-15 PROCEDURE — 80048 BASIC METABOLIC PNL TOTAL CA: CPT | Performed by: INTERNAL MEDICINE

## 2018-05-15 PROCEDURE — 97535 SELF CARE MNGMENT TRAINING: CPT

## 2018-05-15 PROCEDURE — 82948 REAGENT STRIP/BLOOD GLUCOSE: CPT

## 2018-05-15 PROCEDURE — 97530 THERAPEUTIC ACTIVITIES: CPT

## 2018-05-15 PROCEDURE — 99233 SBSQ HOSP IP/OBS HIGH 50: CPT | Performed by: INTERNAL MEDICINE

## 2018-05-15 PROCEDURE — 99232 SBSQ HOSP IP/OBS MODERATE 35: CPT | Performed by: INTERNAL MEDICINE

## 2018-05-15 PROCEDURE — 97116 GAIT TRAINING THERAPY: CPT

## 2018-05-15 PROCEDURE — 85025 COMPLETE CBC W/AUTO DIFF WBC: CPT | Performed by: INTERNAL MEDICINE

## 2018-05-15 RX ORDER — FUROSEMIDE 40 MG/1
40 TABLET ORAL
Status: DISCONTINUED | OUTPATIENT
Start: 2018-05-15 | End: 2018-05-16 | Stop reason: HOSPADM

## 2018-05-15 RX ADMIN — LEVOTHYROXINE SODIUM 25 MCG: 25 TABLET ORAL at 06:04

## 2018-05-15 RX ADMIN — ASPIRIN 81 MG 81 MG: 81 TABLET ORAL at 09:05

## 2018-05-15 RX ADMIN — CHOLECALCIFEROL TAB 10 MCG (400 UNIT) 400 UNITS: 10 TAB at 09:05

## 2018-05-15 RX ADMIN — OXYCODONE HYDROCHLORIDE AND ACETAMINOPHEN 2 TABLET: 5; 325 TABLET ORAL at 19:55

## 2018-05-15 RX ADMIN — OXYCODONE HYDROCHLORIDE AND ACETAMINOPHEN 2 TABLET: 5; 325 TABLET ORAL at 10:40

## 2018-05-15 RX ADMIN — FERROUS SULFATE TAB 325 MG (65 MG ELEMENTAL FE) 325 MG: 325 (65 FE) TAB at 09:05

## 2018-05-15 RX ADMIN — HEPARIN SODIUM 5000 UNITS: 5000 INJECTION, SOLUTION INTRAVENOUS; SUBCUTANEOUS at 22:11

## 2018-05-15 RX ADMIN — BACITRACIN ZINC 1 LARGE APPLICATION: 500 OINTMENT TOPICAL at 09:04

## 2018-05-15 RX ADMIN — INSULIN LISPRO 3 UNITS: 100 INJECTION, SOLUTION INTRAVENOUS; SUBCUTANEOUS at 22:11

## 2018-05-15 RX ADMIN — OXYCODONE HYDROCHLORIDE AND ACETAMINOPHEN 2 TABLET: 5; 325 TABLET ORAL at 01:06

## 2018-05-15 RX ADMIN — FUROSEMIDE 40 MG: 40 TABLET ORAL at 11:38

## 2018-05-15 RX ADMIN — CARVEDILOL 25 MG: 25 TABLET, FILM COATED ORAL at 09:05

## 2018-05-15 RX ADMIN — FUROSEMIDE 80 MG: 10 INJECTION, SOLUTION INTRAMUSCULAR; INTRAVENOUS at 09:05

## 2018-05-15 RX ADMIN — INSULIN GLARGINE 35 UNITS: 100 INJECTION, SOLUTION SUBCUTANEOUS at 22:11

## 2018-05-15 RX ADMIN — OXYCODONE HYDROCHLORIDE AND ACETAMINOPHEN 2 TABLET: 5; 325 TABLET ORAL at 15:28

## 2018-05-15 RX ADMIN — LIDOCAINE 1 PATCH: 50 PATCH CUTANEOUS at 09:04

## 2018-05-15 RX ADMIN — FUROSEMIDE 40 MG: 40 TABLET ORAL at 17:22

## 2018-05-15 RX ADMIN — HEPARIN SODIUM 5000 UNITS: 5000 INJECTION, SOLUTION INTRAVENOUS; SUBCUTANEOUS at 15:28

## 2018-05-15 RX ADMIN — HEPARIN SODIUM 5000 UNITS: 5000 INJECTION, SOLUTION INTRAVENOUS; SUBCUTANEOUS at 06:03

## 2018-05-15 RX ADMIN — CARVEDILOL 25 MG: 25 TABLET, FILM COATED ORAL at 17:22

## 2018-05-15 RX ADMIN — OXYCODONE HYDROCHLORIDE AND ACETAMINOPHEN 2 TABLET: 5; 325 TABLET ORAL at 06:03

## 2018-05-15 NOTE — PROGRESS NOTES
Progress Note - Chrissie Hutn 34 y o  male MRN: [id number]    Unit/Bed#: [facility] 68 2 -01 Encounter: [id number]    Assessment/Plan:        Ambulatory dysfunction   status post BKA, encourage use of a roller walker with all ambulation, which she does at baseline currently not agreeable for short-term rehabilitation reporting "planning to move back to [facility] tomorrow by family is waiting for me, also reports he has already secured plane ticket and under no circumstances would like to go to rehab at this time "    Acute kidney injury     multifactorial with mild rhabdo, cardiorenal, possible ATN, monitor with Nephrology, creatinine is improved to 1 79 today continue to avoid nephrotoxins and hypotension,SPEP and UPEP pending ,nephrology plans to switch to by mouth Lasix tomorrow    CKD 3      baseline creatinine 1 3, continues to show improvement on IV diuresis  Continue for at least 24 hours today  Severe left leg pain    Currently improved and resolving, appears related to traumatic injury and edema, continue warm compresses elevation and narcotic pain control    DM Type II:    Extreme noncompliance with dietary and occasional medication regimen reported  Begin no concentrated carbohydrate diet  Cover with Aspart SSI as needed   Will order HgbA1C to assess status of recent glycemic control  Well initiate home medications once med rec is completed and confirmed by pharmacy  Diastolic heart failure    currently compensated, continue Coreg and Lasix    Hypothyroidism    Synthroid at 25 micrograms daily    Dyslipidemia   Statin on hold for now due to rhabdomyolysis        Disposition: Patient is medically stable for discharge, will switch to by mouth Lasix and Ensure overnight he is able to tolerated without going into volume overload again    Case discussed with nephrology attending, also discussed with patient PT recommendations he is currently adamant would like to go home at this time but will "sleep on it in regards to going to rehab "will approach patient again in regards to going to rehab here if able to still would like to be discharged home plan to DC home  Subjective:   Left leg pain improving, denies chest pain shortness of breath nausea vomiting diarrhea no fevers chills appetite is stable    Objective:     Vitals: Blood pressure 124/58, pulse 93, temperature 100 3 °F (37 9 °C), temperature source Temporal, resp  rate 18, height 5' 2" (1 575 m), weight 101 kg (221 lb 12 5 oz), SpO2 97 %  ,Body mass index is 40 56 kg/m²  Physical exam:    General:  No acute distress, resting in bed, cooperative  Skin:  Warm and dry, no rash  HEENT:  MMM, sclera anicteric  Neck: No JVD, supple  Chest:  Essentially clear on auscultation, slightly decreased basis, no crackles or wheezes noted  Heart: RRR, no rub noted  Abdomen:  Soft, non-tender, non-istended+ Bs  Extremities:  Left BKA, bilateral trace edema  Neuro:  Alert and oriented  Psych:  Appropriate mood and affect, denies any suicidal homicidal ideation denies any depression  t             Results from last 7 days  Lab Units 05/15/18  0427  05/09/18  1449   WBC Thousand/uL 15 63*  < > 12 41*   HEMOGLOBIN g/dL 8 2*  < > 9 0*   HEMATOCRIT % 26 1*  < > 27 4*   PLATELETS Thousands/uL 588*  < > 494*   INR   --   --  1 02   < > = values in this interval not displayed  Results from last 7 days  Lab Units 05/15/18  0427  05/12/18  0535   SODIUM mmol/L 132*  < > 134*   POTASSIUM mmol/L 4 2  < > 4 0   CHLORIDE mmol/L 96*  < > 97*   CO2 mmol/L 31  < > 29   BUN mg/dL 35*  < > 47*   CREATININE mg/dL 1 77*  < > 1 96*   CALCIUM mg/dL 8 7  < > 8 7   TOTAL PROTEIN g/dL  --   --  7 1   GLUCOSE RANDOM mg/dL 117  < > 115   < > = values in this interval not displayed      Scheduled Meds:    Current Facility-Administered Medications:  acetaminophen 650 mg Oral Q6H PRN Chandrika Hill MD   aspirin 81 mg Oral Daily Chandrika Hill MD   bacitracin 1 large application Topical BID Leighann Cheryl Dandy, MD   carvedilol 25 mg Oral BID With Meals Colten Neumann MD   cholecalciferol 400 Units Oral Daily Lauren Vega MD   cyclobenzaprine 10 mg Oral TID PRN Otis Hickey MD   docusate sodium 100 mg Oral Daily PRN Shar Grant MD   ferrous sulfate 325 mg Oral Daily With Breakfast Shar Grant MD   furosemide 40 mg Oral BID (diuretic) Darrin [place],    heparin (porcine) 5,000 Units Subcutaneous Q8H [facility] Lauren Vega MD   insulin glargine 35 Units Subcutaneous HS Colten Neumann MD   insulin lispro 1-6 Units Subcutaneous 4x Daily (AC & HS) Lauren Vega MD   levothyroxine 25 mcg Oral Daily Before Breakfast Lauren Vega MD   lidocaine 1 patch Transdermal Daily Otis Hickey MD   ondansetron 4 mg Intravenous Q4H PRN Lauren Vega MD   oxyCODONE-acetaminophen 2 tablet Oral Q4H PRN Lanie Simple, DO       Continuous Infusions:    Physical exam:  General appearance:  Alert no distress interaction appropriate  Head/Eyes:  Nonicteric PERRL EOMI  Neck:  Supple  Lungs: CTA bilateral no wheezing rhonchi or rales  Heart: normal S1 S2 regular  Abdomen:  Obese nontender with bowel sounds  Extremities: no edema left BKA indurated area is anterior and lateral thigh  Skin: no rash    Invasive Devices     Peripheral Intravenous Line            Peripheral IV 05/09/18 Left Forearm 5 days    Peripheral IV 05/15/18 Right; Lower Forearm less than 1 day                  VTE Pharmacologic Prophylaxis:  heparin   VTE Mechanical Prophylaxis:  heparin                     Counseling / Coordination of Care  Total floor / unit time spent today 30   minutes  Greater than 50% of total time was spent with the patient and / or family counseling and / or coordination of care    A description of the counseling / coordination of care:

## 2018-05-15 NOTE — PROGRESS NOTES
NEPHROLOGY PROGRESS NOTE   Marleny Shah 34 y o  male MRN: [id number]  Unit/Bed#: [address] 2 [address] [id number] Encounter: [id number]      ASSESSMENT & PLAN:    1  Acute kidney injury on chronic kidney disease  -creatinine is now stable with diuresis  -had a robust response to the IV diuresis now with a creatinine that has plateaued and slightly increased  -did receive 80 mg of IV Lasix this morning, will transition to 40 mg p o  b i d   -etiology thought to be secondary to mild rhabdomyolysis, cardiorenal syndrome and ATN  -SPEP and UPEP negative and immunofixation negative for monoclonal protein  -urine protein to creatinine ratio shows greater than 9 g of protein  -has been a diabetic for greater than 20 year  -did stress the importance of outpatient follow up with him in [facility] when he moves back  -he will need to check his weights daily and intakes and outputs at home  -his weight is down to 101 kg from 103 kg    2  Stage 3 chronic kidney disease/with nephrotic syndrome  -baseline creatinine is 1 3, with nephrotic syndrome patient has a high chance of worsening renal failure  -therefore he does need outpatient follow-up  -still with lower extremity edema in the right leg and into left thigh    3  Status post fall  -continue PT and OT  -given his relative stability creatinine that is stable will not check CPK any more as we will not , if creatinine worsening then can recheck    4  Diastolic congestive heart failure  -will transition to oral diuretics tomorrow    5  Hypertension  -continue to hold ACE-inhibitor while diuresing  -blood pressures have been stable around 331 systolic  -goal blood pressure will be 120/80, would like for him to be euvolemic before his carvedilol is up titrated    6  Hyponatremia  -improving now with diuresis in the setting of volume overload and hyperglycemia    7    CKD bone mineral bone disease  -can continue to monitor parameters as an outpatient    If cleared from a PT/OT standpoint, I and has appropriate follow-up in [facility] he can be discharged from a renal standpoint, still with some increased edema in the legs but if ambulating okay and no shortness of breath can continue diuresis as an outpatient with oral diuretics  Stressed the importance of ongoing follow-up as the patient has critical illnesses that include diastolic congestive heart failure and nephrotic syndrome    Will give him our information and will have our office call him to make sure he has appropriate follow-up as well    SUBJECTIVE:    Patient, breathing is stable blood pressure stable good urine output no chest pain no shortness of breath fevers or chills    OBJECTIVE:  Current Weight: Weight - Scale: 101 kg (221 lb 12 5 oz)  Vitals:    05/15/18 0700   BP: 141/69   Pulse: 91   Resp: 18   Temp: 99 3 °F (37 4 °C)   SpO2: 96%       Intake/Output Summary (Last 24 hours) at 05/15/18 0944  Last data filed at 05/15/18 0147   Gross per 24 hour   Intake              960 ml   Output             3850 ml   Net            -2890 ml       General: conscious, cooperative, in not acute distress  Eyes: conjunctivae pink, anicteric sclerae  ENT: lips and mucous membranes moist  Neck: supple, no JVD  Chest: clear breath sounds bilateral, no crackles, ronchus or wheezings  CVS: distinct S1 & S2, normal rate, regular rhythm  Abdomen: soft, non-tender, non-distended, normoactive bowel sounds  Extremities:  Left BKA, right edema into the thighs left thigh edema  Skin: no rash  Neuro: awake, alert, oriented    Medications:    Current Facility-Administered Medications:     acetaminophen (TYLENOL) tablet 650 mg, 650 mg, Oral, Q6H PRN, Bo Herrmann MD    aspirin chewable tablet 81 mg, 81 mg, Oral, Daily, Bo Herrmann MD, 81 mg at 05/15/18 0905    bacitracin topical ointment 1 large application, 1 large application, Topical, BID, Bo Herrmann MD, 1 large application at 98/69/71 0904    carvedilol (COREG) tablet 25 mg, 25 mg, Oral, BID With Meals, Belia Don MD, 25 mg at 05/15/18 0905    cholecalciferol (VITAMIN D3) tablet 400 Units, 400 Units, Oral, Daily, Kelly Boudreaux MD, 400 Units at 05/15/18 0905    cyclobenzaprine (FLEXERIL) tablet 10 mg, 10 mg, Oral, TID PRN, Lesly Peter MD, 10 mg at 05/14/18 0802    docusate sodium (COLACE) capsule 100 mg, 100 mg, Oral, Daily PRN, Gita Anders MD    ferrous sulfate tablet 325 mg, 325 mg, Oral, Daily With Breakfast, Gita Anders MD, 325 mg at 05/15/18 0905    furosemide (LASIX) injection 80 mg, 80 mg, Intravenous, BID (diuretic), Crissie Hashimoto, CRNP, 80 mg at 05/15/18 5568    heparin (porcine) subcutaneous injection 5,000 Units, 5,000 Units, Subcutaneous, Q8H [address], 5,000 Units at 05/15/18 0603 **AND** Platelet count, , , Once, Kelly Boudreaux MD    insulin glargine (LANTUS) subcutaneous injection 35 Units 0 35 mL, 35 Units, Subcutaneous, HS, Belia Don MD, 35 Units at 05/14/18 2123    insulin lispro (HumaLOG) 100 units/mL subcutaneous injection 1-6 Units, 1-6 Units, Subcutaneous, 4x Daily (AC & HS), 1 Units at 05/14/18 2125 **AND** Fingerstick Glucose (POCT), , , 4x Daily AC and at bedtime, Kelly Boudreaux MD    levothyroxine tablet 25 mcg, 25 mcg, Oral, Daily Before Breakfast, Kelly Boudreaux MD, 25 mcg at 05/15/18 0604    lidocaine (LIDODERM) 5 % patch 1 patch, 1 patch, Transdermal, Daily, Lesly Peter MD, 1 patch at 05/15/18 0904    ondansetron (ZOFRAN) injection 4 mg, 4 mg, Intravenous, Q4H PRN, Kelly Boudreaux MD, 4 mg at 05/10/18 2201    oxyCODONE-acetaminophen (PERCOCET) 5-325 mg per tablet 2 tablet, 2 tablet, Oral, Q4H PRN, Relsharath Barnes DO, 2 tablet at 05/15/18 0603    Invasive Devices:      Lab Results:     Results from last 7 days  Lab Units 05/15/18  0427 05/14/18  0509 05/13/18  0453 05/12/18  0535 05/11/18  0447 05/10/18  1050 05/10/18  0603   WBC Thousand/uL 15 63*  --  12 84* 10 94* 10 89*  --  12 95*   HEMOGLOBIN g/dL 8 2*  --  8 7* 8 2* 8 0* --  7 8*   HEMATOCRIT % 26 1*  --  27 5* 25 6* 24 8*  --  24 4*   PLATELETS Thousands/uL 588*  --  568* 576* 523*  --  469*  436*   SODIUM mmol/L 132* 136 135* 134* 130*  --  132*   POTASSIUM mmol/L 4 2 3 4* 3 8 4 0 4 2  --  4 2   CHLORIDE mmol/L 96* 96* 96* 97* 94*  --  97*   CO2 mmol/L 31 33* 31 29 27  --  28   BUN mg/dL 35* 37* 44* 47* 47*  --  36*   CREATININE mg/dL 1 77* 1 69* 1 79* 1 96* 2 09*  --  1 68*   CALCIUM mg/dL 8 7 9 3 8 8 8 7 8 3  --  8 3   MAGNESIUM mg/dL  --  2 0  --   --   --   --  2 0   PHOSPHORUS mg/dL  --   --  5 5*  --   --   --  5 0*   TOTAL PROTEIN g/dL  --   --   --  7 1  --   --   --    GLUCOSE RANDOM mg/dL 117 47* 143* 115 231*  --  192*   PROTEIN UA mg/dl  --   --   --   --   --  >=300*  --    LEUKOCYTES UA   --   --   --   --   --  Negative  --    BLOOD UA   --   --   --   --   --  Large*  --        Previous work up:  Please see previous notes

## 2018-05-15 NOTE — OCCUPATIONAL THERAPY NOTE
[address] Progress Note     Patient Name: Emeli Olivera  AMLDC'F Date: 5/15/2018  Problem List  Patient Active Problem List   Diagnosis    Hypertension    Hypothyroidism     Acute diastolic congestive heart failure     [facility]ARO [facility]) [facility] [address] [facility]    history of Asthma    Hx of BKA, left ([facility] [address] )    Traumatic rhabdomyolysis s/p fall     Morbid obesity     Insulin dependent diabetes mellitus     Hyperlipidemia    Anemia of chronic disease           05/15/18 1450   Restrictions/Precautions   Weight Bearing Precautions Per Order No   Braces or Orthoses Prosthesis  (L LE )   Other Precautions Fall Risk;Pain   Lifestyle   Autonomy At baseline, pt was I w/ ADLs and functional mobility/transfers w/ use of crutches, required assist w/ IADLs, (+) , and reports 1 fall PTA leading to admission  Reciprocal Relationships Visiting dtr; Lives w/ spouse in [facility] (returning on 5/20/18)   Intrinsic Gratification Watching TV   Pain Assessment   Pain Assessment 0-10   Pain Score 8   Pain Type Acute pain   Pain Location Leg   Pain Orientation Left   Pain Descriptors Aching   Pain Frequency Constant/continuous   Pain Onset Ongoing   Clinical Progression Not changed   Effect of Pain on Daily Activities Increased pain w/ activity   Patient's Stated Pain Goal No pain   Hospital Pain Intervention(s) Repositioned; Ambulation/increased activity; Emotional support   Response to Interventions Tolerated   ADL   Grooming Assistance 5  Supervision/Setup   Grooming Deficit Setup;Supervision/safety; Increased time to complete   Grooming Comments Light grooming tasks (wash/dry face, wash hair, brush hair) completed at a Supervision level 2* setup and increased time to complete  UB Dressing Assistance 6  Modified independent   UB Dressing Deficit Increased time to complete   UB Dressing Comments don/doff T-Shirt   LB Dressing Assistance 3  Moderate Assistance   LB Dressing Deficit Setup;Steadying; Requires assistive device for steadying;Verbal cueing;Supervision/safety; Increased time to complete   LB Dressing Comments LB dressing completed w/ Mod A  Pt able to don/doff pants w/ Supervision, demonstrating good safety awareness when pull pants over hips w/ use of RW for unilateral UE support  Total A required to don/doff R sock 2* decreased functional reach demonstrated  Pt able to don L LE prosthetic independently EOB  Toileting Assistance  5  Supervision/Setup   Toileting Deficit Supervison/safety; Increased time to complete;Grab bar use;Clothing management up;Perineal hygiene;Clothing management down   Toileting Comments Toileting tasks completed w/ Supervision for clothing management up/down and perineal hygiene  Functional Standing Tolerance   Time 4 mins   Activity Functional mobility/transfers   Comments No LOB noted   Bed Mobility   Supine to Sit 7  Independent   Sit to Supine 7  Independent   Additional Comments Pt returned to supine position at end of session w/ call bell and phone within reach  All needs met and pt reports no further questions for OT at this time  Transfers   Sit to Stand 5  Supervision   Additional items Assist x 1;Bedrails; Increased time required;Verbal cues   Stand to Sit 5  Supervision   Additional items Assist x 1; Increased time required;Verbal cues   Toilet transfer 5  Supervision   Additional items Assist x 1; Increased time required;Standard toilet;Verbal cues  (use of grab bar)   Additional Comments Cues for safety, technique, and hand placement during transfers   Functional Mobility   Functional Mobility 5  Supervision   Additional Comments Assist x1   Additional items Rolling walker   Toilet Transfers   Toilet Transfer From [employer] walker   Toilet Transfer Type To and from   Toilet Transfer to Standard toilet   Toilet Transfer Technique Stand pivot; Ambulating   Toilet Transfers Supervision   Toilet Transfers Comments Assist x1   Cognition   Overall Cognitive Status [facility] Arousal/Participation Alert; Cooperative   Attention Within functional limits   Orientation Level Oriented X4   Memory Within functional limits   Following Commands Follows all commands and directions without difficulty   Additional Activities   Additional Activities Other (Comment)  (energy conservation education)   Additional Activities Comments Educated pt on energy conservation tips/techniques (ie: seated rest breaks, planning, prioritizing, compensatory breathing, etc ) to decrease energy expenditure and SOB 2* increased SOB demonstrated at times  Pt verbalized understanding and reports he takes seated rest breaks as needed while in the home and community when feeling SOB  Activity Tolerance   Activity Tolerance Patient limited by pain; Patient tolerated treatment well   Medical Staff Made Aware Pt able to be seen per INGE Flower   Assessment   Assessment Pt seen for OT treatment session this PM focusing on functional activity tolerance, bed mobility, ADLs, and functional mobility/transfers  Pt alert and cooperative throughout session  Pt lying supine upon entering room, completing supine>sit at an independent level  Light grooming tasks (wash/dry face, wash hair, brush hair) completed at a Supervision level 2* setup and increased time to complete  LB dressing completed w/ Mod A  Pt able to don/doff pants w/ Supervision, demonstrating good safety awareness when pull pants over hips w/ use of RW for unilateral UE support  Total A required to don/doff R sock 2* decreased functional reach demonstrated  Pt able to don L LE prosthetic independently EOB  Transfers (sit<>stand, RW<>standard toilet) completed at a Supervision level 2* cues required at time for safety and technique  No LOB noted  Functional mobility completed w/ Supervision w/ use of RW w/ Good balance/safety demonstrated  Toileting tasks completed w/ Supervision for clothing management up/down and perineal hygiene  Pt returned to EOB   Educated pt on energy conservation tips/techniques (ie: seated rest breaks, planning, prioritizing, compensatory breathing, etc ) to decrease energy expenditure and SOB 2* increased SOB demonstrated at times  Pt verbalized understanding and reports he takes seated rest breaks as needed while in the home and community when feeling SOB  Pt returned to supine position at end of session w/ call bell and phone within reach  All needs met and pt reports no further questions for OT at this time  Recommend STR vs Home OT, however pt currently declining STR as pt is scheduled to return to [facility] on 05/20/2018  OT to continue to follow pt on caseload  Plan   Treatment Interventions ADL retraining;Functional transfer training; Endurance training;Patient/family training;Equipment evaluation/education; Compensatory technique education;Continued evaluation; Energy conservation; Activityengagement   Goal Expiration Date 05/21/18   Treatment Day 1   OT Frequency 3-5x/wk   Recommendation   OT Discharge Recommendation Short Term Rehab  (vs Home OT (pt currently declining STR))   OT - OK to Discharge Yes  (when medically cleared)   Barthel Index   Feeding 10   Bathing 0   Grooming Score 5   Dressing Score 5   Bladder Score 10   Bowels Score 10   Toilet Use Score 5   Transfers (Bed/Chair) Score 10   Mobility (Level Surface) Score 0   Stairs Score 0   Barthel Index Score 55   Modified [place] Scale   Modified Vivienne Scale 3       Yelitza Humphreys, OTR/L

## 2018-05-15 NOTE — PHYSICAL THERAPY NOTE
Physical Therapy Progress Note     05/15/18 1224   Pain Assessment   Pain Assessment 0-10   Pain Score 8   Pain Type Acute pain   Pain Location Leg   Pain Orientation Left   Hospital Pain Intervention(s) Ambulation/increased activity;Repositioned   Response to Interventions Tolerated  Restrictions/Precautions   Weight Bearing Precautions Per Order No   Braces or Orthoses Prosthesis  (LLE)   Other Precautions Fall Risk;Pain   General   Chart Reviewed Yes   Response to Previous Treatment Patient reporting fatigue but able to participate  Family/Caregiver Present No   Subjective   Subjective [place] to participate in therapy this PM    Bed Mobility   Supine to Sit 7  Independent   Sit to Supine 7  Independent   Transfers   Sit to Stand 5  Supervision   Additional items Assist x 1;Bedrails; Increased time required;Verbal cues; Impulsive   Stand to Sit 5  Supervision   Additional items Assist x 1;Bedrails; Increased time required; Impulsive;Verbal cues   Ambulation/Elevation   Gait pattern Decreased foot clearance; Step to;Excessively slow;Decreased L stance; Inconsistent abiola   Gait Assistance 5  Supervision   Additional items Assist x 1;Verbal cues; Tactile cues   Assistive Device Rolling walker   Distance 76' with stair training performed  Stair Management Assistance 5  Supervision   Additional items Assist x 1;Verbal cues; Tactile cues; Increased time required   Stair Management Technique Two rails; Step to pattern; Foreward;Nonreciprocal   Number of Stairs 10   Balance   Static Sitting Normal   Dynamic Sitting Fair +   Static Standing Fair   Dynamic Standing Fair -   Ambulatory Fair   Endurance Deficit   Endurance Deficit Yes   Endurance Deficit Description fatigue/pain   Activity Tolerance   Activity Tolerance Patient limited by fatigue;Patient limited by pain   Nurse Made Aware Yes   Assessment   Prognosis Fair   Problem List Decreased range of motion;Decreased strength;Decreased endurance; Impaired balance;Decreased mobility;Obesity; Decreased skin integrity;Pain;Decreased safety awareness   Assessment Pt  supine in bed upon my arrival  Able to complete all transfers during treatment session with standbyA of therapist with cues provided for increased safety awareness as pt  performs mobility impulsively  Able to don/doff LLE prosthesis himself seated at EOB  Progressed with an increased amb  trial with use of RW and standbyA of therapist  Continue to recommend RW for d/c, for increased balance support during mobility  Taken to steps via chair transport  Pt  required proper instruction for LE sequencing during stairs, after proper LE sequencing displayed  Pt  able to complete stairs with standbyA of therapist  Taken back to room via chair transport  Returned to supine in bed at end of treatment session  Pt  will continue to benefit from STR upon d/c for continued improvement of noted impairments above  However, pt  is returning to [facility] and is refusing rehab at this time  Barriers to Discharge Decreased caregiver support   Barriers to Discharge Comments Level of support at home   Goals   Patient Goals To go home to [facility]    STG Expiration Date 05/25/18   Treatment Day 1   Plan   Treatment/Interventions Functional transfer training;LE strengthening/ROM; Elevations; Endurance training;Bed mobility;Gait training;Spoke to nursing;Spoke to case management   Progress Progressing toward goals   PT Frequency 5x/wk; Weekend   Recommendation   Recommendation Home with family support; Short-term skilled PT  (Pt  refusing STR at this time  )   Equipment Recommended Walker  (RW)   PT - OK to Discharge Yes  (if d/c when medically stable )     Tommy Merritt PTA

## 2018-05-15 NOTE — PLAN OF CARE
Problem: OCCUPATIONAL THERAPY ADULT  Goal: Performs self-care activities at highest level of function for planned discharge setting  See evaluation for individualized goals  Treatment Interventions: ADL retraining, Functional transfer training, Endurance training, Patient/family training, Equipment evaluation/education, Compensatory technique education, Continued evaluation, Energy conservation, Activityengagement          See flowsheet documentation for full assessment, interventions and recommendations  Outcome: Progressing  Limitation: Decreased ADL status, Decreased endurance, Decreased self-care trans, Decreased high-level ADLs  Prognosis: Good  Assessment: Pt seen for OT treatment session this PM focusing on functional activity tolerance, bed mobility, ADLs, and functional mobility/transfers  Pt alert and cooperative throughout session  Pt lying supine upon entering room, completing supine>sit at an independent level  Light grooming tasks (wash/dry face, wash hair, brush hair) completed at a Supervision level 2* setup and increased time to complete  LB dressing completed w/ Mod A  Pt able to don/doff pants w/ Supervision, demonstrating good safety awareness when pull pants over hips w/ use of RW for unilateral UE support  Total A required to don/doff R sock 2* decreased functional reach demonstrated  Pt able to don L LE prosthetic independently EOB  Transfers (sit<>stand, RW<>standard toilet) completed at a Supervision level 2* cues required at time for safety and technique  No LOB noted  Functional mobility completed w/ Supervision w/ use of RW w/ Good balance/safety demonstrated  Toileting tasks completed w/ Supervision for clothing management up/down and perineal hygiene  Pt returned to EOB  Educated pt on energy conservation tips/techniques (ie: seated rest breaks, planning, prioritizing, compensatory breathing, etc ) to decrease energy expenditure and SOB 2* increased SOB demonstrated at times   Pt verbalized understanding and reports he takes seated rest breaks as needed while in the home and community when feeling SOB  Pt returned to supine position at end of session w/ call bell and phone within reach  All needs met and pt reports no further questions for OT at this time  Recommend STR vs Home OT, however pt currently declining STR as pt is scheduled to return to [facility] on 05/20/2018  OT to continue to follow pt on caseload        OT Discharge Recommendation: Short Term Rehab (vs Home OT (pt currently declining STR))  OT - OK to Discharge: Yes (when medically cleared)

## 2018-05-15 NOTE — PLAN OF CARE
Problem: PHYSICAL THERAPY ADULT  Goal: Performs mobility at highest level of function for planned discharge setting  See evaluation for individualized goals  Treatment/Interventions: Functional transfer training, LE strengthening/ROM, Therapeutic exercise, Elevations, Endurance training, Patient/family training, Bed mobility, Gait training, Spoke to nursing, OT  Equipment Recommended: Prerna Gandhi (AP)       See flowsheet documentation for full assessment, interventions and recommendations  Outcome: Progressing  Prognosis: Fair  Problem List: Decreased range of motion, Decreased strength, Decreased endurance, Impaired balance, Decreased mobility, Obesity, Decreased skin integrity, Pain, Decreased safety awareness  Assessment: Pt  supine in bed upon my arrival  Able to complete all transfers during treatment session with standbyA of therapist with cues provided for increased safety awareness as pt  performs mobility impulsively  Able to don/doff LLE prosthesis himself seated at EOB  Progressed with an increased amb  trial with use of RW and standbyA of therapist  Continue to recommend RW for d/c, for increased balance support during mobility  Taken to steps via chair transport  Pt  required proper instruction for LE sequencing during stairs, after proper LE sequencing displayed  Pt  able to complete stairs with standbyA of therapist  Taken back to room via chair transport  Returned to supine in bed at end of treatment session  Pt  will continue to benefit from STR upon d/c for continued improvement of noted impairments above  However, pt  is returning to [facility] and is refusing rehab at this time  Barriers to Discharge: Decreased caregiver support  Barriers to Discharge Comments: Level of support at home  Recommendation: Home with family support, Short-term skilled PT (Pt  refusing STR at this time  )     PT - OK to Discharge: Yes (if d/c when medically stable )    See flowsheet documentation for full assessment

## 2018-05-16 VITALS
TEMPERATURE: 97.6 F | DIASTOLIC BLOOD PRESSURE: 82 MMHG | SYSTOLIC BLOOD PRESSURE: 144 MMHG | RESPIRATION RATE: 18 BRPM | WEIGHT: 217.81 LBS | OXYGEN SATURATION: 98 % | HEART RATE: 88 BPM | HEIGHT: 62 IN | BODY MASS INDEX: 40.08 KG/M2

## 2018-05-16 LAB
ANION GAP SERPL CALCULATED.3IONS-SCNC: 7 MMOL/L (ref 4–13)
BASOPHILS # BLD AUTO: 0.03 THOUSANDS/ΜL (ref 0–0.1)
BASOPHILS NFR BLD AUTO: 0 % (ref 0–1)
BUN SERPL-MCNC: 37 MG/DL (ref 5–25)
CALCIUM SERPL-MCNC: 8.6 MG/DL (ref 8.3–10.1)
CHLORIDE SERPL-SCNC: 95 MMOL/L (ref 100–108)
CO2 SERPL-SCNC: 30 MMOL/L (ref 21–32)
CREAT SERPL-MCNC: 1.9 MG/DL (ref 0.6–1.3)
EOSINOPHIL # BLD AUTO: 0.21 THOUSAND/ΜL (ref 0–0.61)
EOSINOPHIL NFR BLD AUTO: 2 % (ref 0–6)
ERYTHROCYTE [DISTWIDTH] IN BLOOD BY AUTOMATED COUNT: 15.1 % (ref 11.6–15.1)
GFR SERPL CREATININE-BSD FRML MDRD: 50 ML/MIN/1.73SQ M
GLUCOSE SERPL-MCNC: 105 MG/DL (ref 65–140)
GLUCOSE SERPL-MCNC: 113 MG/DL (ref 65–140)
GLUCOSE SERPL-MCNC: 125 MG/DL (ref 65–140)
GLUCOSE SERPL-MCNC: 140 MG/DL (ref 65–140)
HCT VFR BLD AUTO: 24.3 % (ref 36.5–49.3)
HGB BLD-MCNC: 7.7 G/DL (ref 12–17)
IMM GRANULOCYTES # BLD AUTO: 0.05 THOUSAND/UL (ref 0–0.2)
IMM GRANULOCYTES NFR BLD AUTO: 0 % (ref 0–2)
LYMPHOCYTES # BLD AUTO: 2.05 THOUSANDS/ΜL (ref 0.6–4.47)
LYMPHOCYTES NFR BLD AUTO: 15 % (ref 14–44)
MCH RBC QN AUTO: 28.2 PG (ref 26.8–34.3)
MCHC RBC AUTO-ENTMCNC: 31.7 G/DL (ref 31.4–37.4)
MCV RBC AUTO: 89 FL (ref 82–98)
MONOCYTES # BLD AUTO: 0.9 THOUSAND/ΜL (ref 0.17–1.22)
MONOCYTES NFR BLD AUTO: 7 % (ref 4–12)
NEUTROPHILS # BLD AUTO: 10.09 THOUSANDS/ΜL (ref 1.85–7.62)
NEUTS SEG NFR BLD AUTO: 76 % (ref 43–75)
NRBC BLD AUTO-RTO: 0 /100 WBCS
PLATELET # BLD AUTO: 495 THOUSANDS/UL (ref 149–390)
PMV BLD AUTO: 9 FL (ref 8.9–12.7)
POTASSIUM SERPL-SCNC: 4.1 MMOL/L (ref 3.5–5.3)
RBC # BLD AUTO: 2.73 MILLION/UL (ref 3.88–5.62)
SODIUM SERPL-SCNC: 132 MMOL/L (ref 136–145)
WBC # BLD AUTO: 13.28 THOUSAND/UL (ref 4.31–10.16)

## 2018-05-16 PROCEDURE — 97116 GAIT TRAINING THERAPY: CPT

## 2018-05-16 PROCEDURE — 97530 THERAPEUTIC ACTIVITIES: CPT

## 2018-05-16 PROCEDURE — 97535 SELF CARE MNGMENT TRAINING: CPT

## 2018-05-16 PROCEDURE — 80048 BASIC METABOLIC PNL TOTAL CA: CPT | Performed by: INTERNAL MEDICINE

## 2018-05-16 PROCEDURE — 99232 SBSQ HOSP IP/OBS MODERATE 35: CPT | Performed by: INTERNAL MEDICINE

## 2018-05-16 PROCEDURE — 97112 NEUROMUSCULAR REEDUCATION: CPT

## 2018-05-16 PROCEDURE — 99239 HOSP IP/OBS DSCHRG MGMT >30: CPT | Performed by: PHYSICIAN ASSISTANT

## 2018-05-16 PROCEDURE — 85025 COMPLETE CBC W/AUTO DIFF WBC: CPT | Performed by: INTERNAL MEDICINE

## 2018-05-16 PROCEDURE — 82948 REAGENT STRIP/BLOOD GLUCOSE: CPT

## 2018-05-16 RX ORDER — FERROUS SULFATE 325(65) MG
325 TABLET ORAL
Qty: 30 TABLET | Refills: 0 | Status: SHIPPED | OUTPATIENT
Start: 2018-05-17

## 2018-05-16 RX ORDER — OXYCODONE HYDROCHLORIDE AND ACETAMINOPHEN 5; 325 MG/1; MG/1
1 TABLET ORAL EVERY 6 HOURS PRN
Qty: 20 TABLET | Refills: 0 | Status: SHIPPED | OUTPATIENT
Start: 2018-05-16

## 2018-05-16 RX ORDER — INSULIN GLARGINE 100 [IU]/ML
40 INJECTION, SOLUTION SUBCUTANEOUS
Qty: 10 ML | Refills: 0
Start: 2018-05-16

## 2018-05-16 RX ORDER — FUROSEMIDE 40 MG/1
40 TABLET ORAL 2 TIMES DAILY
Qty: 60 TABLET | Refills: 0 | Status: SHIPPED | OUTPATIENT
Start: 2018-05-16

## 2018-05-16 RX ORDER — CARVEDILOL 12.5 MG/1
25 TABLET ORAL 2 TIMES DAILY WITH MEALS
Qty: 120 TABLET | Refills: 0 | Status: SHIPPED | OUTPATIENT
Start: 2018-05-16

## 2018-05-16 RX ADMIN — LIDOCAINE 1 PATCH: 50 PATCH CUTANEOUS at 08:20

## 2018-05-16 RX ADMIN — OXYCODONE HYDROCHLORIDE AND ACETAMINOPHEN 2 TABLET: 5; 325 TABLET ORAL at 17:28

## 2018-05-16 RX ADMIN — CHOLECALCIFEROL TAB 10 MCG (400 UNIT) 400 UNITS: 10 TAB at 08:22

## 2018-05-16 RX ADMIN — CARVEDILOL 25 MG: 25 TABLET, FILM COATED ORAL at 08:22

## 2018-05-16 RX ADMIN — OXYCODONE HYDROCHLORIDE AND ACETAMINOPHEN 2 TABLET: 5; 325 TABLET ORAL at 05:56

## 2018-05-16 RX ADMIN — CARVEDILOL 25 MG: 25 TABLET, FILM COATED ORAL at 16:31

## 2018-05-16 RX ADMIN — FERROUS SULFATE TAB 325 MG (65 MG ELEMENTAL FE) 325 MG: 325 (65 FE) TAB at 08:22

## 2018-05-16 RX ADMIN — FUROSEMIDE 40 MG: 40 TABLET ORAL at 16:31

## 2018-05-16 RX ADMIN — OXYCODONE HYDROCHLORIDE AND ACETAMINOPHEN 2 TABLET: 5; 325 TABLET ORAL at 00:26

## 2018-05-16 RX ADMIN — FUROSEMIDE 40 MG: 40 TABLET ORAL at 08:22

## 2018-05-16 RX ADMIN — HEPARIN SODIUM 5000 UNITS: 5000 INJECTION, SOLUTION INTRAVENOUS; SUBCUTANEOUS at 14:07

## 2018-05-16 RX ADMIN — BACITRACIN ZINC 1 LARGE APPLICATION: 500 OINTMENT TOPICAL at 08:20

## 2018-05-16 RX ADMIN — HEPARIN SODIUM 5000 UNITS: 5000 INJECTION, SOLUTION INTRAVENOUS; SUBCUTANEOUS at 05:56

## 2018-05-16 RX ADMIN — ASPIRIN 81 MG 81 MG: 81 TABLET ORAL at 08:23

## 2018-05-16 RX ADMIN — LEVOTHYROXINE SODIUM 25 MCG: 25 TABLET ORAL at 05:58

## 2018-05-16 NOTE — PROGRESS NOTES
NEPHROLOGY PROGRESS NOTE   Dawn Ash 34 y o  male MRN: [id number]  Unit/Bed#: [facility] [address] 2 [place] 220-01 Encounter: [id number]      ASSESSMENT & PLAN:    1  Acute kidney injury on chronic kidney disease  -creatinine remained stable 1 90 from 1 77 overall GFR relatively stable  -good urine output with furosemide 40 mg p o  b i d  will continue this dose  -continue fluid restriction of 1 5 L  -etiology thought to be secondary to mild rhabdomyolysis, cardiorenal syndrome and ATN  -SPEP and UPEP negative and immunofixation negative for monoclonal protein  -urine protein to creatinine ratio shows greater than 9 g of protein  -has been a diabetic for greater than 20 year  -did stress the importance of outpatient follow up with him in [facility] when he moves back  -he will need to check his weights daily and intakes and outputs at home  -his weight is down 98 8 kg    2  Stage 3 chronic kidney disease/with nephrotic syndrome  -baseline creatinine is 1 3, with nephrotic syndrome patient has a high chance of worsening renal failure  -therefore he does need outpatient follow-up  -still with edema in the lower extremities but improved overall    3  Status post fall  -continue PT and OT  -given his relative stability creatinine that is stable will not check CPK any more as we will not , if creatinine worsening then can recheck    4  Diastolic congestive heart failure  -continue oral diuresis    5  Hypertension  -continue to hold ACE-inhibitor while diuresing  -blood pressures have been stable around 490 systolic  -goal blood pressure will be 120/80, would like for him to be euvolemic before his carvedilol is up titrated    6  Hyponatremia  -improving now with diuresis in the setting of volume overload and hyperglycemia    7    CKD bone mineral bone disease  -can continue to monitor parameters as an outpatient    Okay for discharge from a renal standpoint, would send home on furosemide 40 mg p o  b i d , instructed him to follow up with his outpatient doctors in [facility], will have our office call him to make sure he has appropriate follow-up       SUBJECTIVE:    Patient has no chest pain or no shortness of breath no fevers or chills all in all feels much better    OBJECTIVE:  Current Weight: Weight - Scale: 98 8 kg (217 lb 13 oz)  Vitals:    05/16/18 0723   BP: 128/69   Pulse: 75   Resp: 18   Temp: (!) 97 2 °F (36 2 °C)   SpO2: 100%       Intake/Output Summary (Last 24 hours) at 05/16/18 1105  Last data filed at 05/16/18 0137   Gross per 24 hour   Intake                0 ml   Output             2300 ml   Net            -2300 ml       General: conscious, cooperative, in not acute distress  Eyes: conjunctivae pink, anicteric sclerae  ENT: lips and mucous membranes moist  Neck: supple, no JVD  Chest: clear breath sounds bilateral, no crackles, ronchus or wheezings  CVS: distinct S1 & S2, normal rate, regular rhythm  Abdomen: soft, non-tender, non-distended, normoactive bowel sounds  Extremities:  Right BKA 1+ edema  Skin: no rash  Neuro: awake, alert, oriented      Medications:    Current Facility-Administered Medications:     acetaminophen (TYLENOL) tablet 650 mg, 650 mg, Oral, Q6H PRN, Kelly Boudreaux MD    aspirin chewable tablet 81 mg, 81 mg, Oral, Daily, Kelly Boudreaux MD, 81 mg at 05/16/18 7829    bacitracin topical ointment 1 large application, 1 large application, Topical, BID, Kelly Boudreaux MD, 1 large application at 24/93/69 0820    carvedilol (COREG) tablet 25 mg, 25 mg, Oral, BID With Meals, Belia Don MD, 25 mg at 05/16/18 1673    cholecalciferol (VITAMIN D3) tablet 400 Units, 400 Units, Oral, Daily, Kelly Boudreaux MD, 400 Units at 05/16/18 0822    cyclobenzaprine (FLEXERIL) tablet 10 mg, 10 mg, Oral, TID PRN, Lesly Peter MD, 10 mg at 05/14/18 0802    docusate sodium (COLACE) capsule 100 mg, 100 mg, Oral, Daily PRN, Gita Anders MD    ferrous sulfate tablet 325 mg, 325 mg, Oral, Daily With Breakfast, Ervin Jaramillo MD, 325 mg at 05/16/18 6340    furosemide (LASIX) tablet 40 mg, 40 mg, Oral, BID (diuretic), Yony Moore DO, 40 mg at 05/16/18 0822    heparin (porcine) subcutaneous injection 5,000 Units, 5,000 Units, Subcutaneous, Q8H [facility], 5,000 Units at 05/16/18 0556 **AND** Platelet count, , , Once, Saintclair Curling, MD    insulin glargine (LANTUS) subcutaneous injection 35 Units 0 35 mL, 35 Units, Subcutaneous, HS, Kieran Crowell MD, 35 Units at 05/15/18 2211    insulin lispro (HumaLOG) 100 units/mL subcutaneous injection 1-6 Units, 1-6 Units, Subcutaneous, 4x Daily (AC & HS), 3 Units at 05/15/18 2211 **AND** Fingerstick Glucose (POCT), , , 4x Daily AC and at bedtime, Saintclair Curling, MD    levothyroxine tablet 25 mcg, 25 mcg, Oral, Daily Before Breakfast, Saintclair Curling, MD, 25 mcg at 05/16/18 0558    lidocaine (LIDODERM) 5 % patch 1 patch, 1 patch, Transdermal, Daily, May Hart MD, 1 patch at 05/16/18 0820    ondansetron [facility]) injection 4 mg, 4 mg, Intravenous, Q4H PRN, Saintclair Curling, MD, 4 mg at 05/10/18 2201    oxyCODONE-acetaminophen (PERCOCET) 5-325 mg per tablet 2 tablet, 2 tablet, Oral, Q4H PRN, Allie Dsouza DO, 2 tablet at 05/16/18 0556    Invasive Devices:      Lab Results:     Results from last 7 days  Lab Units 05/16/18  0534 05/15/18  0427 05/14/18  0509 05/13/18  0453 05/12/18  0535 05/11/18  0447 05/10/18  1050 05/10/18  0603   WBC Thousand/uL 13 28* 15 63*  --  12 84* 10 94* 10 89*  --  12 95*   HEMOGLOBIN g/dL 7 7* 8 2*  --  8 7* 8 2* 8 0*  --  7 8*   HEMATOCRIT % 24 3* 26 1*  --  27 5* 25 6* 24 8*  --  24 4*   PLATELETS Thousands/uL 495* 588*  --  568* 576* 523*  --  469*  436*   SODIUM mmol/L 132* 132* 136 135* 134* 130*  --  132*   POTASSIUM mmol/L 4 1 4 2 3 4* 3 8 4 0 4 2  --  4 2   CHLORIDE mmol/L 95* 96* 96* 96* 97* 94*  --  97*   CO2 mmol/L 30 31 33* 31 29 27  --  28   BUN mg/dL 37* 35* 37* 44* 47* 47*  --  36*   CREATININE mg/dL 1 90* 1 77* 1 69* 1 79* 1 96* 2 09*  --  1 68*   CALCIUM mg/dL 8 6 8 7 9 3 8 8 8 7 8 3  --  8 3   MAGNESIUM mg/dL  --   --  2 0  --   --   --   --  2 0   PHOSPHORUS mg/dL  --   --   --  5 5*  --   --   --  5 0*   TOTAL PROTEIN g/dL  --   --   --   --  7 1  --   --   --    GLUCOSE RANDOM mg/dL 125 117 47* 143* 115 231*  --  192*   PROTEIN UA mg/dl  --   --   --   --   --   --  >=300*  --    LEUKOCYTES UA   --   --   --   --   --   --  Negative  --    BLOOD UA   --   --   --   --   --   --  Large*  --        Previous work up:  Please see previous notes

## 2018-05-16 NOTE — DISCHARGE SUMMARY
Discharge Summary - [facility] Internal Medicine    Patient Information: Chrissie Headley 34 y o  male MRN: [id number]  Unit/Bed#: [address] 220-01 Encounter: [id number]    Discharging Physician / Practitioner: Alpa Sood PA-C  PCP: No primary care provider on file  Admission Date:   Admission Orders     Ordered        05/09/18 1742  Inpatient Admission (expected length of stay for this patient is greater than two midnights)  Once             Discharge Date: 05/16/18    Reason for Admission:  Rhabdo and GARO    Discharge Diagnoses:     Principal Problem:    Traumatic rhabdomyolysis s/p fall   Active Problems:    Hypertension    Hypothyroidism     Acute diastolic congestive heart failure     GARO (acute kidney injury) ([facility] [address] )    history of Asthma    Hx of BKA, left (HCC)    Morbid obesity     Insulin dependent diabetes mellitus     Hyperlipidemia    Anemia of chronic disease  Resolved Problems:    * No resolved hospital problems  *      Consultations During Hospital Stay:  · Renal   Anna Sic  · Cardiology Dr Seth Must  · Wound Care    Procedures Performed:     · CXR: While there is central pulmonary venous distention and prominent bronchovascular markings, there is now worsening airspace opacification in the right lung base  · CT femur: 1  Diffuse left anterior thigh subcutaneous edema as well as anterior thigh muscular edema consistent with provided clinical history of posttraumatic injury  No collection identified in this unenhanced study  Correlate clinically  · Left femur: No acute osseous abnormality  Left inguinal lymphadenopathy  Correlate clinically  3   Status post below-knee amputation  Venous duplex: RIGHT LOWER LIMB LIMITED:  Evaluation shows no evidence of thrombus in the common femoral vein  Doppler evaluation shows a normal response to augmentation maneuvers  LEFT LOWER LIMB:  No evidence of acute or chronic deep vein thrombosis  No evidence of superficial thrombophlebitis noted    Doppler evaluation shows a normal response to augmentation maneuvers  Popliteal arterial Doppler waveforms are triphasic  Tech Note: There are multiple echogenic structures located in the inguinal  region and throughout the leg  The largest structure measures approximately 3 86  cm and is consistent with enlarged lymph node and channels  Significant Findings / Test Results:     · Acute kidney injury on chronic kidney disease stage 3, felt to be related to mild rhabdo, cardiorenal syndrome an ATN  · Hyponatremia  · DM2, uncontrolled, with A1C of 14 8  · Leukocytosis, improving    Incidental Findings:   · Thrombocytosis, stable     Test Results Pending at Discharge (will require follow up): · Hemoccult stool     Outpatient Tests Requested:  · Follow up with PCP  · NEEDS RENAL FOLLOW UP  · NEEDS FOLLOW UP WITH ENDOCRINOLOGY    Complications:  none    Hospital Course:     Roosevelt Jones is a 34 y o  male patient who originally presented to the hospital on 5/9/2018 due to falling off a chair  He was noted to have been recently admitted and was discharged the day prior to admission with a new diagnosis of acute  diastolic CHF  The patient was noted to have an elevated CPK of 1277 and CT was noted to show subcutaneous and muscular edema consistent with posttraumatic injury  The patient was started on IV fluids for traumatic rhabdo and concurrently with IV Lasix for CHF  The patient was noted to have a KI on admission with a creatinine of 1 49 from baseline of 1 18  The patient was seen in consultation by Nephrology, the patient was discontinued from IV fluids and started on IV Lasix  The the patient was seen by Cardiology who felt to appear well compensated  Chest x-ray was noted to show pulmonary congestion with worsening opacification and proBNP was noted to be 2240 which was increased from prior    The patient was noted to have an A1c of 14 8 however given his high use of home regimen while inpatient as blood sugars were 140s to 230s and therefore was felt was likely noncompliant with regimen at home  The patient was started on Lidoderm patch and Flexeril  The patient had no evidence of compartment syndrome on exam   The patient continued to be seen by Cardiology and Nephrology and there was suspected component of cardiorenal syndrome  Lasix dose was increased to 3 times daily  The patient was noted to have nephrotic range proteinuria and was ordered for SPEP  The patient was noted to have anemia with low iron saturation  Iron tablets were initiated and fecal occult blood testing was ordered  Given the patient's nephrotic syndrome the patient was at high risk for worsening renal failure  CPK was noted to trend down and was noted to be stabilized in the 700s  SPEP and UPEP were negative for monoclonal protein  Urine protein to creatinine ratio greater than 9 g of protein and the patient has been a diabetic for over 20 years  The patient is at extremely high risk for worsening chronic kidney disease and will require close outpatient follow-up  He needs to monitor his I&Os and daily weights as an outpatient  The patient was transitioned to Lasix 40 mg p o  b i d  and instructed to follow up with his outpatient doctors in [facility]   Nephrology office will call him to make sure he has had appropriate follow-up as well  In regards to his DM2, his A1C on admission was 14 8  He was noted to eating MREs at home given he was without power and had overall poor diet but had been in [facility] since December and still was eating poorly  He has an endocrinologist in [facility] and was noted to be on lantus 75 units qhs and humalog sliding scale (up to 45 units) units with breakfast and PRN humalog at dinner  He did not skip doses  He was noncompliant with DM diet  While here he was noted to initially be on 75 units of Lantus and his blood sugars were in the 200s    However after 2 days of 75 units of Lantus, the patient was noted to become hypoglycemic with the use of this dose to 37  He was on a carbohydrate restricted diet  He was noted to have reduced dose of Lantus to 35 units q h s  and blood sugars were noted to remain in the 110 to 120s  He was noted to use 35 units of Lantus and approximately 3-4 units of Humalog per day  I discussed with him in detail regard noting his diabetic diet and plan for his oral intake at home  He insists he will be following a DM diet as he is in the hospital because "he is scared to not control his heart and sugars"  Thus, in order to prevent hypoglycemia he will be discharged on lantus 40 units qhs with continued use of sliding scale humalog with breakfast and dinner (he does  Not eat lunch)  He is instructed to check glucose ACHS and if elevated to >300 or <70 to seek medical attention to adjust lantus doses  We discussed that his A1C is showing poor control but that if his diet is remaining as it is now, he would become hypoglycemic with 75 units of lantus  Condition at Discharge: stable     Discharge Day Visit / Exam:     Subjective:  Feeling well  Reports brown stool- no black and no tarry appearance  No BRBPR  No nausea or vomiting  No chest pain or SOB  Thigh pain still present but improving  No numbness or tingling in the leg  No chest pain or SOB  Taking percocet prior to arrival  Knows he needs follow up with endo and renal as outpatient  Vitals: Blood Pressure: 128/69 (05/16/18 0723)  Pulse: 75 (05/16/18 0723)  Temperature: (!) 97 2 °F (36 2 °C) (05/16/18 0723)  Temp Source: Temporal (05/16/18 0723)  Respirations: 18 (05/16/18 0723)  Height: 5' 2" (157 5 cm) (05/11/18 1532)  Weight - Scale: 98 8 kg (217 lb 13 oz) (05/16/18 0540)  SpO2: 100 % (05/16/18 0723)  Exam:   Physical Exam   Constitutional: No distress  HENT:   Head: Normocephalic and atraumatic  Eyes: Conjunctivae are normal    Neck: No JVD present     Cardiovascular: Normal rate, regular rhythm, normal heart sounds and intact distal pulses  No murmur heard  Pulmonary/Chest: No respiratory distress  He has no wheezes  He has no rales  Abdominal: Soft  Bowel sounds are normal  He exhibits no distension  There is no tenderness  There is no rebound and no guarding  Musculoskeletal: He exhibits tenderness ( left thigh tenderness, soft, sensation intact to light touch  moving left knee  left BKA)  Neurological: He is alert  Skin: Skin is warm and dry  No rash noted  He is not diaphoretic  No erythema  Psychiatric: He has a normal mood and affect  Nursing note and vitals reviewed  Discussion with Family: patient    Discharge instructions/Information to patient and family:   See after visit summary for information provided to patient and family  Provisions for Follow-Up Care:  See after visit summary for information related to follow-up care and any pertinent home health orders  Disposition:     Home    For Discharges to [facility] SNF:   · Not Applicable to this Patient - Not Applicable to this Patient    Planned Readmission: no     Discharge Statement:  I spent 45 minutes discharging the patient  This time was spent on the day of discharge  I had direct contact with the patient on the day of discharge  Greater than 50% of the total time was spent examining patient, answering all patient questions, arranging and discussing plan of care with patient as well as directly providing post-discharge instructions  Additional time then spent on discharge activities  Discharge Medications:  See after visit summary for reconciled discharge medications provided to patient and family        ** Please Note: This note has been constructed using a voice recognition system **

## 2018-05-16 NOTE — NURSING NOTE
Pt  D/C home  D/C instructions reviewed with pt  Pt  Sent with scripts  Pt  Escorted to car by PCA in W/C

## 2018-05-16 NOTE — PLAN OF CARE
Problem: PHYSICAL THERAPY ADULT  Goal: Performs mobility at highest level of function for planned discharge setting  See evaluation for individualized goals  Treatment/Interventions: Functional transfer training, LE strengthening/ROM, Therapeutic exercise, Elevations, Endurance training, Patient/family training, Bed mobility, Gait training, Spoke to nursing, OT  Equipment Recommended: Liane Keene (RW)       See flowsheet documentation for full assessment, interventions and recommendations  Outcome: Progressing  Prognosis: Fair  Problem List: Decreased range of motion, Decreased endurance, Impaired balance, Decreased strength, Decreased mobility, Decreased safety awareness, Decreased skin integrity, Pain  Assessment: Agreeable to therapy  Able to don prosthesis independently but with increased time  Receptive to education and reports understanding to monitor skin intergrity given open area near WB surface of prosthetic  S for transfers, but cues required upon approach to [facility] to keep RW with him for completion of transition for optimal safety  Stair navigation and further progression of ambulation limited as pt began vomitting  Able to verbalize correct technique for stair navigation  Only stairs at dtr's home, but upon return to [facility] has just 2 ROSSANA and wife available to assist prn  Pt intends to return to [facility] on 5/19  Barriers to Discharge: Decreased caregiver support, Inaccessible home environment  Barriers to Discharge Comments: more support at home when returns to [facility]  Recommendation: Home with family support (as pt refusing STR  Suggest increased support   )     PT - OK to Discharge: Yes    See flowsheet documentation for full assessment

## 2018-05-16 NOTE — PHYSICAL THERAPY NOTE
PHYSICAL THERAPY NOTE          Patient Name: Cruz ARRINGTON'S Date: 5/16/2018 05/16/18 6720   Pain Assessment   Pain Type Acute pain   Pain Location Leg   Pain Orientation Left   Hospital Pain Intervention(s) Repositioned; Emotional support   Response to Interventions satisifed   Pain Rating: FLACC (Rest) - Face 0   Pain Rating: FLACC (Rest) - Legs 0   Pain Rating: FLACC (Rest) - Activity 0   Pain Rating: FLACC (Rest) - Cry 0   Pain Rating: FLACC (Rest) - Consolability 0   Score: FLACC (Rest) 0   Pain Rating: FLACC (Activity) - Face 0   Pain Rating: FLACC (Activity) - Legs 0   Pain Rating: FLACC (Activity) - Activity 0   Pain Rating: FLACC (Activity) - Cry 1   Pain Rating: FLACC (Activity) - Consolability 1   Score: FLACC (Activity) 2   Restrictions/Precautions   Braces or Orthoses Prosthesis  (LLE)   Other Precautions Fall Risk;Pain   General   Chart Reviewed Yes   Response to Previous Treatment Patient with no complaints from previous session  Family/Caregiver Present No   Cognition   Overall Cognitive Status WFL   Subjective   Subjective Dizzy and nauseated during session  Initially reporting agreeable to therapy and stairs  Bed Mobility   Supine to Sit 7  Independent   Additional Comments Dons prothesis with I   EOB sitting x 15 minutes  Transfers   Sit to Stand 5  Supervision   Additional items Verbal cues; Increased time required   Stand to Sit 5  Supervision   Additional items Increased time required;Verbal cues   Additional Comments performed sit<>stand x 3 during session  Cues to keep RW with him fully until completion of transition  Cues for hand placement   Ambulation/Elevation   Gait pattern Decreased foot clearance; Inconsistent abiola;Circumduction   Gait Assistance 5  Supervision   Additional items Assist x 1   Assistive Device Rolling walker   Distance 15'x2, 20'x1  Limited by nausea     Stair Management Assistance (verbalizes correct technique)   Balance   Static Sitting Normal   Dynamic Sitting Fair +   Static Standing Fair   Dynamic Standing [address]  (with walker)   Endurance Deficit   Endurance Deficit Yes   Endurance Deficit Description fatigue/pain   Activity Tolerance   Activity Tolerance Patient limited by fatigue;Patient limited by pain   Nurse Made Aware yes   Equipment Use   Comments Pt education on monitoring skin intergrity given L knee open area with use of prosthesis  Reinforced proper transfers for optimal safety  Fall reduction strategies  Assessment   Prognosis Fair   Problem List Decreased range of motion;Decreased endurance; Impaired balance;Decreased strength;Decreased mobility; Decreased safety awareness;Decreased skin integrity;Pain   Assessment Agreeable to therapy  Able to don prosthesis independently but with increased time  Receptive to education and reports understanding to monitor skin intergrity given open area near WB surface of prosthetic  S for transfers, but cues required upon approach to [facility] to keep RW with him for completion of transition for optimal safety  Stair navigation and further progression of ambulation limited as pt began vomitting  Able to verbalize correct technique for stair navigation  Only stairs at dtr's home, but upon return to [facility] has just 2 ROSSANA and wife available to assist prn  Pt intends to return to [facility] on 5/19  Barriers to Discharge Decreased caregiver support; Inaccessible home environment   Barriers to Discharge Comments more support at home when returns to [facility] [place]   Goals   Patient Goals go to [facility] [place]   STG Expiration Date 05/25/18   Treatment Day 2   Plan   Treatment/Interventions Functional transfer training;LE strengthening/ROM; Elevations; Therapeutic exercise; Endurance training;Patient/family training;Equipment eval/education; Bed mobility;Gait training; Compensatory technique education;Continued evaluation;Spoke to nursing;OT   Progress Progressing toward goals   PT Frequency 5x/wk; Weekend   Recommendation   Recommendation Home with family support  (as pt refusing STR  Suggest increased support   )   Equipment Recommended Mary Nassar  (reports has in [facility])   PT - [facility] to Discharge Yes   Additional Comments Intends to return to [facility] on 5/19  May benefit from PT f/u upon return to [facility] for continued strengthening, mobilty training and optimizing gait stability and safety     Donice Oppenheim, PT

## 2018-05-16 NOTE — OCCUPATIONAL THERAPY NOTE
Occupational Therapy Treatment Note:       05/16/18 0903   Restrictions/Precautions   Weight Bearing Precautions Per Order No   Braces or Orthoses Prosthesis  (LOLE)   Other Precautions Fall Risk;Pain   Pain Assessment   Pain Assessment FLACC   Pain Score 2   Pain Type Acute pain;Chronic pain   Pain Location Leg   Pain Orientation Left   Pain Rating: FLACC (Rest) - Face 0   Pain Rating: FLACC (Rest) - Legs 0   Pain Rating: FLACC (Rest) - Activity 0   Pain Rating: FLACC (Rest) - Cry 0   Pain Rating: FLACC (Rest) - Consolability 0   Score: FLACC (Rest) 0   Pain Rating: FLACC (Activity) - Face 1   Pain Rating: FLACC (Activity) - Legs 0   Pain Rating: FLACC (Activity) - Activity 0   Pain Rating: FLACC (Activity) - Cry 1   Pain Rating: FLACC (Activity) - Consolability 1   Score: FLACC (Activity) 3   ADL   Where Assessed Standing at sink   Grooming Assistance 5  Supervision/Setup   Grooming Deficit Setup;Steadying;Supervision/safety;Verbal cueing; Increased time to complete;Standing with assistive device; Wash/dry hands; Teeth care;Wash/dry face;Brushing hair   Grooming Comments Pt with increased nausea following activities instance  LB Bathing Assistance 4  Minimal Assistance   LB Bathing Deficit Setup;Verbal cueing;Supervision/safety; Increased time to complete; Buttocks; Perineal area   LB Dressing Assistance 4  Minimal Assistance   LB Dressing Deficit Setup; Requires assistive device for steadying;Steadying;Verbal cueing;Supervision/safety; Increased time to complete; Don/doff R sock; Don/doff L sock;Pull up over hips; Thread LLE into underwear; Thread RLE into underwear   LB Dressing Comments difficulty noted to don socks over R foot  Pt reports his wife helps him at home  Toileting Assistance  5  Supervision/Setup   Toileting Deficit Setup;Steadying;Verbal cueing;Supervison/safety; Increased time to complete   Toileting Comments Pt able to complete task without need for hands on A      Functional Standing Tolerance   Time 5 mins     Activity functional mobility  Comments Pt with increased fatigue and nausea noted with activities instance  Bed Mobility   Supine to Sit 6  Modified independent   Sit to Supine 5  Supervision   Transfers   Sit to Stand 5  Supervision   Additional items Assist x 1; Armrests; Bedrails; Increased time required;Verbal cues   Stand to Sit 5  Supervision   Additional items Assist x 1;Bedrails;Armrests; Increased time required;Verbal cues   Stand pivot 5  Supervision   Additional items Assist x 1;Bedrails;Armrests; Increased time required;Verbal cues   Toilet transfer 5  Supervision   Additional items Assist x 1; Increased time required;Verbal cues;Standard toilet   Additional Comments Pt will benefit from further review of safety with use of RW  Functional Mobility   Functional Mobility 5  Supervision   Additional Comments x1   Additional items Rolling walker   Toilet Transfers   Toilet Transfer From Bed   Toilet Transfer Type To   Toilet Transfer to Standard toilet   Toilet Transfer Technique Stand pivot; Ambulating   Toilet Transfers Supervision;Verbal cues   Toilet Transfers Comments cues for safety reqiured  Cognition   Overall Cognitive Status WFL   Arousal/Participation Alert; Responsive   Attention Attends with cues to redirect   Orientation Level Oriented X4   Memory Decreased short term memory;Decreased recall of precautions   Following Commands Follows multistep commands with increased time or repetition   Comments cues for use of reviewed techs for safety required through out tx session  Additional Activities   Additional Activities Other (Comment)  (reviewed fall prevention/home safety)   Additional Activities Comments Pt reports F understanding with inconsistent carry over of reviewed information  Activity Tolerance   Activity Tolerance Patient limited by fatigue;Patient limited by pain   Medical Staff Made Aware Reported all findings to nursing staff      Assessment   Assessment Pt was seen for skilled OT with focus on completion of self care tasks, functional transfers and review of self skin inspections, fall prevention and home safety with all functional tasks  Pt initially resistive to completion of bathing activity stating, "I can do that by myself"  Pt is a private person and preferred to have staff close the bathroom door with functional tasks at sink instance  Early release of RW noted warranting need for cues to maintain safe positioning inside RW perimeter  No LOB noted with functional tasks instance  Poor carry over noted with reviewed techs for fall prevention/home safety with support of RW  See above levels of A required for all functional tasks  Pt with increased nausea following functional tasks instance  Pt returned to supine positioning with basin in hand and all items with in reach  Reported findings to nursing staff  Pt will benefit from home therapies with direct return to home environment  Plan   Treatment Interventions ADL retraining;Functional transfer training; Endurance training   Goal Expiration Date 05/21/18   Treatment Day 2   OT Frequency 3-5x/wk   Recommendation   OT Discharge Recommendation Short Term Rehab   Equipment Recommended (vs home therapies pending progress  )   Barthel Index   Feeding 10   Bathing 0   Grooming Score 5   Dressing Score 5   Bladder Score 10   Bowels Score 10   Toilet Use Score 5   Transfers (Bed/Chair) Score 10   Mobility (Level Surface) Score 0   Stairs Score 0   Barthel Index Score 55   Modified [place] Scale   Modified Vivienne Scale 3   Robert Lamas, [address]

## 2018-05-16 NOTE — PLAN OF CARE
Problem: OCCUPATIONAL THERAPY ADULT  Goal: Performs self-care activities at highest level of function for planned discharge setting  See evaluation for individualized goals  Treatment Interventions: ADL retraining, Functional transfer training, Endurance training, Patient/family training, Equipment evaluation/education, Compensatory technique education, Continued evaluation, Energy conservation, Activityengagement          See flowsheet documentation for full assessment, interventions and recommendations  Outcome: Progressing  Limitation: Decreased ADL status, Decreased endurance, Decreased self-care trans, Decreased high-level ADLs  Prognosis: Good  Assessment: Pt was seen for skilled OT with focus on completion of self care tasks, functional transfers and review of self skin inspections, fall prevention and home safety with all functional tasks  Pt initially resistive to completion of bathing activity stating, "I can do that by myself"  Pt is a private person and preferred to have staff close the bathroom door with functional tasks at sink instance  Early release of RW noted warranting need for cues to maintain safe positioning inside RW perimeter  No LOB noted with functional tasks instance  Poor carry over noted with reviewed techs for fall prevention/home safety with support of RW  See above levels of A required for all functional tasks  Pt with increased nausea following functional tasks instance  Pt returned to supine positioning with basin in hand and all items with in reach  Reported findings to nursing staff  Pt will benefit from home therapies with direct return to home environment        OT Discharge Recommendation: Short Term Rehab  OT - OK to Discharge: Yes (when medically cleared)      Comments: Jerry Negro, [address]

## 2018-05-21 ENCOUNTER — PATIENT OUTREACH (OUTPATIENT)
Dept: CARDIOLOGY CLINIC | Facility: CLINIC | Age: 38
End: 2018-05-21

## 2018-05-21 NOTE — PROGRESS NOTES
Heart Failure Care Coordination Note  RE: Call to patient to follow up after discharge post fall  The patient was a readmission for Heart Failure but was not readmitted for HF   I spoke to his daughter who stated he has already returned to [facility]

## 2024-07-13 NOTE — PLAN OF CARE
Administered clonidine 0.1 mg po prn for /97 @ 2045.   DISCHARGE PLANNING     Discharge to home or other facility with appropriate resources Progressing        DISCHARGE PLANNING - CARE MANAGEMENT     Discharge to post-acute care or home with appropriate resources Progressing        INFECTION - ADULT     Absence or prevention of progression during hospitalization Progressing     Absence of fever/infection during neutropenic period Progressing        MUSCULOSKELETAL - ADULT     Maintain or return mobility to safest level of function Progressing     Maintain proper alignment of affected body part Progressing        PAIN - ADULT     Verbalizes/displays adequate comfort level or baseline comfort level Progressing        Potential for Falls     Patient will remain free of falls Progressing        Prexisting or High Potential for Compromised Skin Integrity     Skin integrity is maintained or improved Progressing        SAFETY ADULT     Maintain or return to baseline ADL function Progressing     Maintain or return mobility status to optimal level Progressing        SKIN/TISSUE INTEGRITY - ADULT     Skin integrity remains intact Progressing     Incision(s), wounds(s) or drain site(s) healing without S/S of infection Progressing     Oral mucous membranes remain intact Progressing